# Patient Record
Sex: FEMALE | Race: WHITE | Employment: UNEMPLOYED | URBAN - METROPOLITAN AREA
[De-identification: names, ages, dates, MRNs, and addresses within clinical notes are randomized per-mention and may not be internally consistent; named-entity substitution may affect disease eponyms.]

---

## 2017-10-07 ENCOUNTER — HOSPITAL ENCOUNTER (EMERGENCY)
Age: 27
Discharge: HOME | End: 2017-10-07
Attending: EMERGENCY MEDICINE
Payer: SELF-PAY

## 2017-10-07 VITALS
HEART RATE: 88 BPM | RESPIRATION RATE: 16 BRPM | TEMPERATURE: 98.1 F | DIASTOLIC BLOOD PRESSURE: 86 MMHG | HEIGHT: 68 IN | SYSTOLIC BLOOD PRESSURE: 138 MMHG | BODY MASS INDEX: 39.25 KG/M2 | OXYGEN SATURATION: 100 % | WEIGHT: 259 LBS

## 2017-10-07 DIAGNOSIS — R25.1 EPISODE OF SHAKING: Primary | ICD-10-CM

## 2017-10-07 LAB
ALBUMIN SERPL-MCNC: 4.3 G/DL (ref 3.5–5)
ALBUMIN/GLOB SERPL: 1.2 {RATIO} (ref 1.2–3.5)
ALP SERPL-CCNC: 53 U/L (ref 50–136)
ALT SERPL-CCNC: 36 U/L (ref 12–65)
ANION GAP SERPL CALC-SCNC: 9 MMOL/L (ref 7–16)
AST SERPL-CCNC: 15 U/L (ref 15–37)
BACTERIA URNS QL MICRO: NORMAL /HPF
BASOPHILS # BLD: 0.1 K/UL (ref 0–0.2)
BASOPHILS NFR BLD: 1 % (ref 0–2)
BILIRUB SERPL-MCNC: 0.2 MG/DL (ref 0.2–1.1)
BUN SERPL-MCNC: 16 MG/DL (ref 6–23)
CALCIUM SERPL-MCNC: 9.7 MG/DL (ref 8.3–10.4)
CASTS URNS QL MICRO: 0 /LPF
CHLORIDE SERPL-SCNC: 106 MMOL/L (ref 98–107)
CO2 SERPL-SCNC: 27 MMOL/L (ref 21–32)
CREAT SERPL-MCNC: 0.85 MG/DL (ref 0.6–1)
DIFFERENTIAL METHOD BLD: ABNORMAL
EOSINOPHIL # BLD: 0.1 K/UL (ref 0–0.8)
EOSINOPHIL NFR BLD: 1 % (ref 0.5–7.8)
EPI CELLS #/AREA URNS HPF: NORMAL /HPF
ERYTHROCYTE [DISTWIDTH] IN BLOOD BY AUTOMATED COUNT: 12.5 % (ref 11.9–14.6)
GLOBULIN SER CALC-MCNC: 3.6 G/DL (ref 2.3–3.5)
GLUCOSE BLD STRIP.AUTO-MCNC: 96 MG/DL (ref 65–100)
GLUCOSE SERPL-MCNC: 89 MG/DL (ref 65–100)
HCG UR QL: NEGATIVE
HCT VFR BLD AUTO: 38 % (ref 35.8–46.3)
HGB BLD-MCNC: 13.6 G/DL (ref 11.7–15.4)
IMM GRANULOCYTES # BLD: 0 K/UL (ref 0–0.5)
IMM GRANULOCYTES NFR BLD: 0.3 % (ref 0–5)
LYMPHOCYTES # BLD: 3.5 K/UL (ref 0.5–4.6)
LYMPHOCYTES NFR BLD: 44 % (ref 13–44)
MCH RBC QN AUTO: 31.4 PG (ref 26.1–32.9)
MCHC RBC AUTO-ENTMCNC: 35.8 G/DL (ref 31.4–35)
MCV RBC AUTO: 87.8 FL (ref 79.6–97.8)
MONOCYTES # BLD: 0.4 K/UL (ref 0.1–1.3)
MONOCYTES NFR BLD: 5 % (ref 4–12)
NEUTS SEG # BLD: 3.9 K/UL (ref 1.7–8.2)
NEUTS SEG NFR BLD: 49 % (ref 43–78)
PLATELET # BLD AUTO: 367 K/UL (ref 150–450)
PMV BLD AUTO: 9.9 FL (ref 10.8–14.1)
POTASSIUM SERPL-SCNC: 3.8 MMOL/L (ref 3.5–5.1)
PROT SERPL-MCNC: 7.9 G/DL (ref 6.3–8.2)
RBC # BLD AUTO: 4.33 M/UL (ref 4.05–5.25)
RBC #/AREA URNS HPF: NORMAL /HPF
SODIUM SERPL-SCNC: 142 MMOL/L (ref 136–145)
WBC # BLD AUTO: 7.9 K/UL (ref 4.3–11.1)
WBC URNS QL MICRO: NORMAL /HPF

## 2017-10-07 PROCEDURE — 81015 MICROSCOPIC EXAM OF URINE: CPT | Performed by: EMERGENCY MEDICINE

## 2017-10-07 PROCEDURE — 99284 EMERGENCY DEPT VISIT MOD MDM: CPT | Performed by: EMERGENCY MEDICINE

## 2017-10-07 PROCEDURE — 93005 ELECTROCARDIOGRAM TRACING: CPT | Performed by: EMERGENCY MEDICINE

## 2017-10-07 PROCEDURE — 82962 GLUCOSE BLOOD TEST: CPT

## 2017-10-07 PROCEDURE — 85025 COMPLETE CBC W/AUTO DIFF WBC: CPT | Performed by: EMERGENCY MEDICINE

## 2017-10-07 PROCEDURE — 81025 URINE PREGNANCY TEST: CPT

## 2017-10-07 PROCEDURE — 80053 COMPREHEN METABOLIC PANEL: CPT | Performed by: EMERGENCY MEDICINE

## 2017-10-07 PROCEDURE — 81003 URINALYSIS AUTO W/O SCOPE: CPT | Performed by: EMERGENCY MEDICINE

## 2017-10-07 NOTE — DISCHARGE INSTRUCTIONS
Learning About Psychogenic Non-Epileptic Seizure  What is psychogenic non-epileptic seizure (PNES)? Psychogenic non-epileptic seizures (PNES) don't have a physical cause. They aren't caused by epilepsy. But people with epilepsy also may have PNES. People who have a lot of stress, mental illness, or emotional trauma may be more likely to have PNES. Even though PNES doesn't have a physical cause, it is a real condition. The seizures can be scary. And not knowing why you're having them can be frustrating. What happens during PNES? PNES may look like epileptic seizures. But epileptic seizures usually follow the same pattern every time. With PNES, each episode may be different. During a PNES episode, you may have jerky movements, tingling skin, or problems with coordination. You may notice changes in your vision or sense of smell. Some people have episodes often. Others have them only once in a while. For some people, episodes stop over time. Other people keep having them. How is PNES diagnosed? Your doctor will do tests to find out if you have epilepsy. An EEG test lets your doctor see the electrical activity of your brain. The test is often used to diagnose epilepsy. It helps your doctor know what types of seizures you are having. Your doctor also may do blood tests. PNES can be mistaken for epilepsy at first. As a result, some people with PNES are treated with epilepsy medicines. But most of the time, these medicines don't help. The right diagnosis allows your doctor to give you treatments that will help with the stress and other issues that may be related to PNES. How is PNES treated? Treatment varies with each person. The goals of treatment are to relieve stress and to help you learn ways to cope with difficult areas of your life. You may get medicines or counseling. A type of counseling called cognitive-behavioral therapy (CBT) may help with the stress and emotional issues.  In CBT, you learn to identify and change thinking styles that may be adding to your stress. CBT is done by licensed mental health providers, such as psychologists, social workers, and therapists. It can be done in one-on-one sessions or in a group setting. Care at home  Lowering your stress may help with PNES. Here are some things you can do. How to relax your mind  · Write. It may help to write about things that are bothering you. This helps you find out how much stress you feel and what is causing it. When you know this, you can find better ways to cope. · Let your feelings out. Talk, laugh, cry, and express anger when you need to. Talking with friends, family, a counselor, or a member of the clergy about your feelings is a healthy way to relieve stress. · Do something you enjoy. For example, listen to music or go to a movie. Practice your hobby or do volunteer work. · Meditate. This can help you relax, because you are not worrying about what happened before or what may happen in the future. · Do guided imagery. Imagine yourself in any setting that helps you feel calm. You can use audiotapes, books, or a teacher to guide you. How to relax your body  · Do something active. Exercise or activity can help reduce stress. Walking is a great way to get started. Even everyday activities such as housecleaning or yard work can help. · Do breathing exercises. For example:  1. From a standing position, bend forward from the waist with your knees slightly bent. Let your arms dangle close to the floor. 2. Breathe in slowly and deeply as you return to a standing position. Roll up slowly, and lift your head last.  3. Hold your breath for just a few seconds in the standing position. 4. Breathe out slowly and bend forward from the waist.  · Try yoga or mahsa chi. These techniques combine exercise and meditation. You may need some training at first to learn them.   Talk to your doctor about whether it is safe to do certain activities, such as drive or swim. Follow-up care is a key part of your treatment and safety. Be sure to make and go to all appointments, and call your doctor if you are having problems. It's also a good idea to know your test results and keep a list of the medicines you take. Where can you learn more? Go to http://raji-aurea.info/. Enter W302 in the search box to learn more about \"Learning About Psychogenic Non-Epileptic Seizure. \"  Current as of: October 14, 2016  Content Version: 11.3  © 5228-4216 [employer]. Care instructions adapted under license by [employer] (which disclaims liability or warranty for this information). If you have questions about a medical condition or this instruction, always ask your healthcare professional. [address] any warranty or liability for your use of this information.

## 2017-10-07 NOTE — ED NOTES
I have reviewed discharge instructions with the patient. The patient verbalized understanding. Patient left ED via Discharge Method: ambulatory to Home with her mother. Opportunity for questions and clarification provided. Patient given 0 scripts.

## 2017-10-07 NOTE — ED TRIAGE NOTES
Pt arrives to this facility reporting a seizure that began at approximately 2300. Pt reports \"still feeling\" the seizure at this moment in time and reports it is ongoing. Pt is alert and oriented x 4. Pt reports that she has been awaiting a neurologist consult for the past three months. PT reports beginning to have seizures when she was 21years old.

## 2017-10-07 NOTE — ED PROVIDER NOTES
HPI Comments: Patient states she had a seizure tonight. She states she was at home and bent over when she felt a sharp stabbing pain in her left posterior leg. It caused her to scream out. She states that she started having areas of tingling and then jumping which moved around her body. She remembers all of this and did not lose consciousness. She states she had similar symptoms about 2 weeks ago and went to [facility].  She was seeing Dr. George Hahn of neurology for her seizures but 6 months ago he closed his practice. She takes Topamax for seizures and states she has been taking this medicine. Elements of this note were made using speech recognition software. As such, errors of speech recognition may occur. Patient is a 32 y.o. female presenting with seizures. The history is provided by the patient. Seizure    Pertinent negatives include no nausea and no vomiting. She reports no vomiting. Past Medical History:   Diagnosis Date    Neurological disorder     Other ill-defined conditions(799.89)     narcalepsy    Other ill-defined conditions(799.89)     fibromyalgia    Other ill-defined conditions(799.89)     PCOS (polycystic ovarian syndrome)        Past Surgical History:   Procedure Laterality Date    HX CHOLECYSTECTOMY      HX WISDOM TEETH EXTRACTION           Family History:   Problem Relation Age of Onset    Diabetes Father     Hypertension Father     Breast Cancer Neg Hx     Ovarian Cancer Neg Hx     Colon Cancer Neg Hx        Social History     Social History    Marital status: SINGLE     Spouse name: N/A    Number of children: N/A    Years of education: N/A     Occupational History    Not on file.      Social History Main Topics    Smoking status: Never Smoker    Smokeless tobacco: Never Used    Alcohol use No    Drug use: No    Sexual activity: Not Currently     Other Topics Concern    Not on file     Social History Narrative         ALLERGIES: Latex, natural rubber; Amoxicillin; Doxycycline; Epinephrine; Gluten; Lyrica [pregabalin]; Neosporin [benzalkonium chloride]; Penicillins; Sulfa (sulfonamide antibiotics); and Zithromax [azithromycin]    Review of Systems   Constitutional: Negative for chills and fever. Gastrointestinal: Negative for nausea and vomiting. All other systems reviewed and are negative. Vitals:    10/07/17 0010   BP: (!) 146/93   Pulse: (!) 102   Resp: 18   Temp: 98.5 °F (36.9 °C)   SpO2: 100%   Weight: 117.5 kg (259 lb)   Height: 5' 8\" (1.727 m)            Physical Exam   Constitutional: She is oriented to person, place, and time. She appears well-developed and well-nourished. HENT:   Head: Normocephalic and atraumatic. Eyes: Conjunctivae are normal. Pupils are equal, round, and reactive to light. Neck: Normal range of motion. Neck supple. Cardiovascular: Normal rate and regular rhythm. Pulmonary/Chest: Effort normal and breath sounds normal.   Musculoskeletal: She exhibits no edema or tenderness. Neurological: She is alert and oriented to person, place, and time. Skin: Skin is warm and dry. Psychiatric: She has a normal mood and affect. Her behavior is normal.   Nursing note and vitals reviewed.        MDM  Number of Diagnoses or Management Options  Episode of shaking: established and worsening  Diagnosis management comments: Differential diagnoses: Seizure, left-sided abnormality, anxiety  3:51 AM discussed results with patient and mom  She has an appointment with neurology on October 19       Amount and/or Complexity of Data Reviewed  Clinical lab tests: ordered and reviewed    Risk of Complications, Morbidity, and/or Mortality  Presenting problems: moderate  Diagnostic procedures: moderate  Management options: moderate    Patient Progress  Patient progress: stable    ED Course       Procedures

## 2017-10-08 LAB
ATRIAL RATE: 99 BPM
CALCULATED P AXIS, ECG09: 49 DEGREES
CALCULATED R AXIS, ECG10: 11 DEGREES
CALCULATED T AXIS, ECG11: 44 DEGREES
DIAGNOSIS, 93000: NORMAL
P-R INTERVAL, ECG05: 142 MS
Q-T INTERVAL, ECG07: 348 MS
QRS DURATION, ECG06: 84 MS
QTC CALCULATION (BEZET), ECG08: 446 MS
VENTRICULAR RATE, ECG03: 99 BPM

## 2020-06-01 ENCOUNTER — HOSPITAL ENCOUNTER (EMERGENCY)
Age: 30
Discharge: HOME | End: 2020-06-01
Attending: EMERGENCY MEDICINE
Payer: SELF-PAY

## 2020-06-01 VITALS
DIASTOLIC BLOOD PRESSURE: 71 MMHG | HEART RATE: 78 BPM | BODY MASS INDEX: 39.1 KG/M2 | TEMPERATURE: 98 F | OXYGEN SATURATION: 98 % | SYSTOLIC BLOOD PRESSURE: 121 MMHG | WEIGHT: 258 LBS | RESPIRATION RATE: 20 BRPM | HEIGHT: 68 IN

## 2020-06-01 DIAGNOSIS — G43.909 MIGRAINE WITHOUT STATUS MIGRAINOSUS, NOT INTRACTABLE, UNSPECIFIED MIGRAINE TYPE: Primary | ICD-10-CM

## 2020-06-01 DIAGNOSIS — M54.50 ACUTE LOW BACK PAIN WITHOUT SCIATICA, UNSPECIFIED BACK PAIN LATERALITY: ICD-10-CM

## 2020-06-01 PROCEDURE — 96372 THER/PROPH/DIAG INJ SC/IM: CPT

## 2020-06-01 PROCEDURE — 99283 EMERGENCY DEPT VISIT LOW MDM: CPT

## 2020-06-01 PROCEDURE — 74011250636 HC RX REV CODE- 250/636: Performed by: EMERGENCY MEDICINE

## 2020-06-01 RX ORDER — PROCHLORPERAZINE EDISYLATE 5 MG/ML
10 INJECTION INTRAMUSCULAR; INTRAVENOUS
Status: COMPLETED | OUTPATIENT
Start: 2020-06-01 | End: 2020-06-01

## 2020-06-01 RX ORDER — DEXAMETHASONE SODIUM PHOSPHATE 100 MG/10ML
10 INJECTION INTRAMUSCULAR; INTRAVENOUS
Status: COMPLETED | OUTPATIENT
Start: 2020-06-01 | End: 2020-06-01

## 2020-06-01 RX ORDER — MORPHINE SULFATE 10 MG/ML
10 INJECTION, SOLUTION INTRAMUSCULAR; INTRAVENOUS
Status: COMPLETED | OUTPATIENT
Start: 2020-06-01 | End: 2020-06-01

## 2020-06-01 RX ADMIN — PROCHLORPERAZINE EDISYLATE 10 MG: 5 INJECTION INTRAMUSCULAR; INTRAVENOUS at 16:27

## 2020-06-01 RX ADMIN — DEXAMETHASONE SODIUM PHOSPHATE 10 MG: 10 INJECTION INTRAMUSCULAR; INTRAVENOUS at 16:26

## 2020-06-01 RX ADMIN — MORPHINE SULFATE 10 MG: 10 INJECTION INTRAVENOUS at 16:27

## 2020-06-01 NOTE — LETTER
[address] [address] EMERGENCY DEPT 
[address] [address] [facility] [address] [phone] Work/School Note Date: 6/1/2020 To Whom It May concern: 
 
Brandi Gutierrez was seen and treated today in the emergency room by the following provider(s): 
Attending Provider: Robbie Pinon MD.   
 
Brandi Gutierrez {Return to school/sport/work: 6/3/2020 Sincerely, Ayan Hager MD

## 2020-06-01 NOTE — ED TRIAGE NOTES
Patient co headache going on for 3 days.   Patient states she was helping a family member up and hurt her lower back

## 2020-06-01 NOTE — ED PROVIDER NOTES
Patient is a 24-year-old female with a history of migraine headaches who comes to the ER today complaining of a migraine headache for the past 3 days. No relief with home medications. No trauma or injury. States she is also had nausea and vomiting as well as photophobia. Patient also complains of left lower back pain. She was helping  her grandfather off the floor today when grandmother tried to pull the patient as well and she twisted her back. The history is provided by the patient. Headache    This is a new problem. The current episode started more than 2 days ago. The problem occurs constantly. The problem has not changed since onset. The headache is aggravated by nothing. The pain is located in the generalized region. The quality of the pain is described as throbbing. The pain is moderate. Associated symptoms include malaise/fatigue, nausea and vomiting. Pertinent negatives include no fever, no chest pressure, no palpitations, no shortness of breath, no weakness, no tingling and no visual change. She has tried NSAIDs and triptan therapy for the symptoms. The treatment provided no relief.         Past Medical History:   Diagnosis Date    Neurological disorder     Other ill-defined conditions(799.89)     narcalepsy    Other ill-defined conditions(799.89)     fibromyalgia    Other ill-defined conditions(799.89)     PCOS (polycystic ovarian syndrome)        Past Surgical History:   Procedure Laterality Date    HX CHOLECYSTECTOMY      HX WISDOM TEETH EXTRACTION           Family History:   Problem Relation Age of Onset    Diabetes Father     Hypertension Father     Breast Cancer Neg Hx     Ovarian Cancer Neg Hx     Colon Cancer Neg Hx        Social History     Socioeconomic History    Marital status: SINGLE     Spouse name: Not on file    Number of children: Not on file    Years of education: Not on file    Highest education level: Not on file   Occupational History    Not on file Social Needs    Financial resource strain: Not on file    Food insecurity     Worry: Not on file     Inability: Not on file    Transportation needs     Medical: Not on file     Non-medical: Not on file   Tobacco Use    Smoking status: Never Smoker    Smokeless tobacco: Never Used   Substance and Sexual Activity    Alcohol use: No    Drug use: No    Sexual activity: Not Currently   Lifestyle    Physical activity     Days per week: Not on file     Minutes per session: Not on file    Stress: Not on file   Relationships    Social connections     Talks on phone: Not on file     Gets together: Not on file     Attends Lutheran service: Not on file     Active member of club or organization: Not on file     Attends meetings of clubs or organizations: Not on file     Relationship status: Not on file    Intimate partner violence     Fear of current or ex partner: Not on file     Emotionally abused: Not on file     Physically abused: Not on file     Forced sexual activity: Not on file   Other Topics Concern    Not on file   Social History Narrative    Not on file         ALLERGIES: Latex, natural rubber; Amoxicillin; Doxycycline; Epinephrine; Gluten; Lyrica [pregabalin]; Neosporin [benzalkonium chloride]; Penicillins; Sulfa (sulfonamide antibiotics); and Zithromax [azithromycin]    Review of Systems   Constitutional: Positive for malaise/fatigue. Negative for chills, fatigue and fever. HENT: Negative for congestion, rhinorrhea and sore throat. Eyes: Negative for pain, discharge and visual disturbance. Respiratory: Negative for cough and shortness of breath. Cardiovascular: Negative for chest pain and palpitations. Gastrointestinal: Positive for nausea and vomiting. Negative for abdominal pain and diarrhea. Endocrine: Negative for polydipsia and polyuria. Genitourinary: Negative for dysuria, frequency and urgency. Musculoskeletal: Positive for back pain. Negative for neck pain.    Skin: Negative for rash. Neurological: Positive for headaches. Negative for tingling, seizures, syncope and weakness. Hematological: Negative. Vitals:    06/01/20 1430   BP: 113/78   Pulse: 100   Resp: (!) 98   Temp: 98.1 °F (36.7 °C)   SpO2: 96%   Weight: 117 kg (258 lb)   Height: 5' 8\" (1.727 m)            Physical Exam  Vitals signs and nursing note reviewed. Constitutional:       Appearance: Normal appearance. She is well-developed. She is obese. HENT:      Head: Normocephalic and atraumatic. Nose: Nose normal.   Eyes:      Conjunctiva/sclera: Conjunctivae normal.      Pupils: Pupils are equal, round, and reactive to light. Neck:      Musculoskeletal: Normal range of motion and neck supple. Cardiovascular:      Rate and Rhythm: Normal rate and regular rhythm. Pulmonary:      Effort: Pulmonary effort is normal.      Breath sounds: Normal breath sounds. Abdominal:      Palpations: Abdomen is soft. Tenderness: There is no abdominal tenderness. There is no guarding or rebound. Musculoskeletal: Normal range of motion. General: No deformity or signs of injury. Comments: No vertebral tenderness, step-off, or deformity. There is muscle spasm to the left lumbar paraspinal muscles. Lymphadenopathy:      Cervical: No cervical adenopathy. Skin:     General: Skin is warm and dry. Capillary Refill: Capillary refill takes less than 2 seconds. Coloration: Skin is pale. Findings: No rash. Neurological:      General: No focal deficit present. Mental Status: She is alert and oriented to person, place, and time. GCS: GCS eye subscore is 4. GCS verbal subscore is 5. GCS motor subscore is 6. Cranial Nerves: Cranial nerves are intact. No cranial nerve deficit. Sensory: Sensation is intact. No sensory deficit. Motor: Motor function is intact. No weakness, tremor or seizure activity.       Comments: Straight leg raise is negative          MDM  Number of Diagnoses or Management Options  Diagnosis management comments: I wore appropriate PPE throughout this patient's ED visit. Angelia Butt MD, 4:51 PM    Voice dictation software was used during the making of this note. This software is not perfect and grammatical and other typographical errors may be present. This note has been proofread, but may still contain errors.   Angelia Butt MD; 6/1/2020 @4:51 PM   ===================================================================         Amount and/or Complexity of Data Reviewed  Review and summarize past medical records: yes  Independent visualization of images, tracings, or specimens: yes    Risk of Complications, Morbidity, and/or Mortality  Presenting problems: low  Diagnostic procedures: minimal  Management options: low    Patient Progress  Patient progress: improved         Procedures

## 2020-06-01 NOTE — ED NOTES
I have reviewed discharge instructions with the patient. The patient verbalized understanding. Patient left ED via Discharge Method: ambulatory to Home with (mother). Opportunity for questions and clarification provided. Patient given 0 scripts. To continue your aftercare when you leave the hospital, you may receive an automated call from our care team to check in on how you are doing. This is a free service and part of our promise to provide the best care and service to meet your aftercare needs.  If you have questions, or wish to unsubscribe from this service please call [phone]. Thank you for Choosing our [facility].

## 2020-06-01 NOTE — DISCHARGE INSTRUCTIONS
Continue with your current medications. Follow-up with your doctor or return to the ER for any other acute concerns.

## 2020-06-02 ENCOUNTER — HOSPITAL ENCOUNTER (EMERGENCY)
Age: 30
Discharge: HOME | End: 2020-06-02
Attending: EMERGENCY MEDICINE
Payer: SELF-PAY

## 2020-06-02 VITALS
WEIGHT: 258 LBS | TEMPERATURE: 98.3 F | HEART RATE: 89 BPM | HEIGHT: 68 IN | RESPIRATION RATE: 18 BRPM | OXYGEN SATURATION: 100 % | SYSTOLIC BLOOD PRESSURE: 106 MMHG | BODY MASS INDEX: 39.1 KG/M2 | DIASTOLIC BLOOD PRESSURE: 61 MMHG

## 2020-06-02 DIAGNOSIS — G43.009 MIGRAINE WITHOUT AURA AND WITHOUT STATUS MIGRAINOSUS, NOT INTRACTABLE: Primary | ICD-10-CM

## 2020-06-02 PROCEDURE — 74011250637 HC RX REV CODE- 250/637: Performed by: EMERGENCY MEDICINE

## 2020-06-02 PROCEDURE — 96374 THER/PROPH/DIAG INJ IV PUSH: CPT

## 2020-06-02 PROCEDURE — 96375 TX/PRO/DX INJ NEW DRUG ADDON: CPT

## 2020-06-02 PROCEDURE — 99284 EMERGENCY DEPT VISIT MOD MDM: CPT

## 2020-06-02 PROCEDURE — 74011250636 HC RX REV CODE- 250/636: Performed by: EMERGENCY MEDICINE

## 2020-06-02 RX ORDER — SUMATRIPTAN 50 MG/1
50 TABLET, FILM COATED ORAL
Status: COMPLETED | OUTPATIENT
Start: 2020-06-02 | End: 2020-06-02

## 2020-06-02 RX ORDER — SUMATRIPTAN 100 MG/1
100 TABLET, FILM COATED ORAL
Qty: 10 TAB | Refills: 0 | Status: SHIPPED | OUTPATIENT
Start: 2020-06-02 | End: 2020-08-28

## 2020-06-02 RX ORDER — BUTALBITAL, ACETAMINOPHEN AND CAFFEINE 300; 40; 50 MG/1; MG/1; MG/1
1 CAPSULE ORAL 3 TIMES DAILY
Qty: 11 CAP | Refills: 0 | Status: SHIPPED | OUTPATIENT
Start: 2020-06-02 | End: 2020-06-11

## 2020-06-02 RX ORDER — METOCLOPRAMIDE HYDROCHLORIDE 5 MG/ML
10 INJECTION INTRAMUSCULAR; INTRAVENOUS
Status: COMPLETED | OUTPATIENT
Start: 2020-06-02 | End: 2020-06-02

## 2020-06-02 RX ORDER — DIPHENHYDRAMINE HYDROCHLORIDE 50 MG/ML
25 INJECTION, SOLUTION INTRAMUSCULAR; INTRAVENOUS
Status: COMPLETED | OUTPATIENT
Start: 2020-06-02 | End: 2020-06-02

## 2020-06-02 RX ADMIN — DIPHENHYDRAMINE HYDROCHLORIDE 25 MG: 50 INJECTION, SOLUTION INTRAMUSCULAR; INTRAVENOUS at 19:35

## 2020-06-02 RX ADMIN — SUMATRIPTAN SUCCINATE 50 MG: 50 TABLET ORAL at 19:34

## 2020-06-02 RX ADMIN — METOCLOPRAMIDE 10 MG: 5 INJECTION, SOLUTION INTRAMUSCULAR; INTRAVENOUS at 19:36

## 2020-06-02 RX ADMIN — SODIUM CHLORIDE 1000 ML: 900 INJECTION, SOLUTION INTRAVENOUS at 19:34

## 2020-06-02 NOTE — ED NOTES
Report from [facility]. Pt resting quietly on stretcher. Pt attached to BP cuff and pulse ox. VSS. Pt has c/o migraine. Meds given per MAR. Warm blanket provided. Will continue to monitor.

## 2020-06-02 NOTE — ED PROVIDER NOTES
26-year-old lady with a history of migraines presents with concerns about a migraine headache. She says that she had a similar headache yesterday but got some medication that provided some relief. She notes though that her headache came back yesterday evening and this morning. She says that she is also had some nausea but no active vomiting or diarrhea. She denies any fevers or chills. She said that she is had this now for about 4 days. Elements of this note were created using speech recognition software. As such, errors of speech recognition may be present.            Past Medical History:   Diagnosis Date    Neurological disorder     Other ill-defined conditions(799.89)     narcalepsy    Other ill-defined conditions(799.89)     fibromyalgia    Other ill-defined conditions(799.89)     PCOS (polycystic ovarian syndrome)        Past Surgical History:   Procedure Laterality Date    HX CHOLECYSTECTOMY      HX WISDOM TEETH EXTRACTION           Family History:   Problem Relation Age of Onset    Diabetes Father     Hypertension Father     Breast Cancer Neg Hx     Ovarian Cancer Neg Hx     Colon Cancer Neg Hx        Social History     Socioeconomic History    Marital status: SINGLE     Spouse name: Not on file    Number of children: Not on file    Years of education: Not on file    Highest education level: Not on file   Occupational History    Not on file   Social Needs    Financial resource strain: Not on file    Food insecurity     Worry: Not on file     Inability: Not on file    Transportation needs     Medical: Not on file     Non-medical: Not on file   Tobacco Use    Smoking status: Never Smoker    Smokeless tobacco: Never Used   Substance and Sexual Activity    Alcohol use: No    Drug use: No    Sexual activity: Not Currently   Lifestyle    Physical activity     Days per week: Not on file     Minutes per session: Not on file    Stress: Not on file   Relationships    Social connections     Talks on phone: Not on file     Gets together: Not on file     Attends Tenriism service: Not on file     Active member of club or organization: Not on file     Attends meetings of clubs or organizations: Not on file     Relationship status: Not on file    Intimate partner violence     Fear of current or ex partner: Not on file     Emotionally abused: Not on file     Physically abused: Not on file     Forced sexual activity: Not on file   Other Topics Concern    Not on file   Social History Narrative    Not on file         ALLERGIES: Latex, natural rubber; Amoxicillin; Doxycycline; Epinephrine; Gluten; Lyrica [pregabalin]; Neosporin [benzalkonium chloride]; Penicillins; Sulfa (sulfonamide antibiotics); and Zithromax [azithromycin]    Review of Systems   Constitutional: Negative for chills, diaphoresis and fever. HENT: Negative for congestion, rhinorrhea and sore throat. Eyes: Negative for redness and visual disturbance. Respiratory: Negative for cough, chest tightness, shortness of breath and wheezing. Cardiovascular: Negative for chest pain and palpitations. Gastrointestinal: Positive for nausea. Negative for abdominal pain, blood in stool, diarrhea and vomiting. Endocrine: Negative for polydipsia and polyuria. Genitourinary: Negative for dysuria and hematuria. Musculoskeletal: Negative for arthralgias, myalgias and neck stiffness. Skin: Negative for rash. Allergic/Immunologic: Negative for environmental allergies and food allergies. Neurological: Positive for headaches. Negative for dizziness and weakness. Hematological: Negative for adenopathy. Does not bruise/bleed easily. Psychiatric/Behavioral: Negative for confusion and sleep disturbance. The patient is not nervous/anxious.         Vitals:    06/02/20 1812   BP: 110/70   Pulse: 100   Resp: 14   Temp: 98.6 °F (37 °C)   SpO2: 97%   Weight: 117 kg (258 lb)   Height: 5' 8\" (1.727 m)            Physical Exam  Vitals signs and nursing note reviewed. Constitutional:       General: She is not in acute distress. Appearance: She is well-developed. She is not toxic-appearing. HENT:      Head: Normocephalic and atraumatic. Eyes:      General: No scleral icterus. Right eye: No discharge. Left eye: No discharge. Conjunctiva/sclera: Conjunctivae normal.      Pupils: Pupils are equal, round, and reactive to light. Neck:      Musculoskeletal: Normal range of motion. No neck rigidity. Cardiovascular:      Rate and Rhythm: Normal rate and regular rhythm. Heart sounds: Normal heart sounds. Pulmonary:      Effort: Pulmonary effort is normal. No respiratory distress. Breath sounds: Normal breath sounds. No wheezing or rales. Chest:      Chest wall: No tenderness. Abdominal:      General: Bowel sounds are normal. There is no distension. Palpations: Abdomen is soft. Tenderness: There is no guarding or rebound. Musculoskeletal: Normal range of motion. General: No tenderness. Lymphadenopathy:      Cervical: No cervical adenopathy. Skin:     General: Skin is warm and dry. Neurological:      General: No focal deficit present. Mental Status: She is alert and oriented to person, place, and time. Psychiatric:         Mood and Affect: Mood normal.         Behavior: Behavior normal.          MDM  Number of Diagnoses or Management Options  Diagnosis management comments: I will treat with Benadryl, Reglan, and Imitrex.          Procedures

## 2020-06-02 NOTE — LETTER
[address] [address] EMERGENCY DEPT 
[address] [address] [facility] [address] [phone] Work/School Note Date: 6/2/2020 To Whom It May concern: 
 
Aruna Henry was seen and treated today in the emergency room by the following provider(s): 
Attending Provider: Danya Garrett MD.   
 
Janelle Dickerson please excuse her from work on Tuesday and Wednesday. Sincerely, Zoltan Tinajero MD

## 2020-06-03 ENCOUNTER — HOSPITAL ENCOUNTER (EMERGENCY)
Age: 30
Discharge: HOME | End: 2020-06-03
Attending: EMERGENCY MEDICINE
Payer: SELF-PAY

## 2020-06-03 VITALS
HEIGHT: 68 IN | BODY MASS INDEX: 37.89 KG/M2 | HEART RATE: 73 BPM | OXYGEN SATURATION: 99 % | TEMPERATURE: 98 F | DIASTOLIC BLOOD PRESSURE: 65 MMHG | RESPIRATION RATE: 16 BRPM | WEIGHT: 250 LBS | SYSTOLIC BLOOD PRESSURE: 113 MMHG

## 2020-06-03 DIAGNOSIS — G43.911 INTRACTABLE MIGRAINE WITH STATUS MIGRAINOSUS, UNSPECIFIED MIGRAINE TYPE: Primary | ICD-10-CM

## 2020-06-03 PROCEDURE — 99284 EMERGENCY DEPT VISIT MOD MDM: CPT

## 2020-06-03 PROCEDURE — 96374 THER/PROPH/DIAG INJ IV PUSH: CPT

## 2020-06-03 PROCEDURE — 74011250636 HC RX REV CODE- 250/636: Performed by: EMERGENCY MEDICINE

## 2020-06-03 PROCEDURE — 96365 THER/PROPH/DIAG IV INF INIT: CPT

## 2020-06-03 PROCEDURE — 96375 TX/PRO/DX INJ NEW DRUG ADDON: CPT

## 2020-06-03 RX ORDER — SODIUM CHLORIDE 0.9 % (FLUSH) 0.9 %
5-40 SYRINGE (ML) INJECTION AS NEEDED
Status: DISCONTINUED | OUTPATIENT
Start: 2020-06-03 | End: 2020-06-03 | Stop reason: HOSPADM

## 2020-06-03 RX ORDER — DEXAMETHASONE SODIUM PHOSPHATE 100 MG/10ML
10 INJECTION INTRAMUSCULAR; INTRAVENOUS
Status: COMPLETED | OUTPATIENT
Start: 2020-06-03 | End: 2020-06-03

## 2020-06-03 RX ORDER — MAGNESIUM SULFATE HEPTAHYDRATE 40 MG/ML
2 INJECTION, SOLUTION INTRAVENOUS
Status: COMPLETED | OUTPATIENT
Start: 2020-06-03 | End: 2020-06-03

## 2020-06-03 RX ORDER — KETOROLAC TROMETHAMINE 30 MG/ML
30 INJECTION, SOLUTION INTRAMUSCULAR; INTRAVENOUS
Status: COMPLETED | OUTPATIENT
Start: 2020-06-03 | End: 2020-06-03

## 2020-06-03 RX ORDER — SODIUM CHLORIDE 0.9 % (FLUSH) 0.9 %
5-40 SYRINGE (ML) INJECTION EVERY 8 HOURS
Status: DISCONTINUED | OUTPATIENT
Start: 2020-06-03 | End: 2020-06-03 | Stop reason: HOSPADM

## 2020-06-03 RX ORDER — LORAZEPAM 2 MG/ML
0.5 INJECTION INTRAMUSCULAR
Status: COMPLETED | OUTPATIENT
Start: 2020-06-03 | End: 2020-06-03

## 2020-06-03 RX ORDER — METOCLOPRAMIDE HYDROCHLORIDE 5 MG/ML
10 INJECTION INTRAMUSCULAR; INTRAVENOUS
Status: COMPLETED | OUTPATIENT
Start: 2020-06-03 | End: 2020-06-03

## 2020-06-03 RX ADMIN — DEXAMETHASONE SODIUM PHOSPHATE 10 MG: 10 INJECTION INTRAMUSCULAR; INTRAVENOUS at 18:23

## 2020-06-03 RX ADMIN — LORAZEPAM 0.5 MG: 2 INJECTION INTRAMUSCULAR; INTRAVENOUS at 18:22

## 2020-06-03 RX ADMIN — KETOROLAC TROMETHAMINE 30 MG: 30 INJECTION, SOLUTION INTRAMUSCULAR at 18:23

## 2020-06-03 RX ADMIN — METOCLOPRAMIDE 10 MG: 5 INJECTION, SOLUTION INTRAMUSCULAR; INTRAVENOUS at 18:23

## 2020-06-03 RX ADMIN — MAGNESIUM SULFATE HEPTAHYDRATE 2 G: 40 INJECTION, SOLUTION INTRAVENOUS at 18:22

## 2020-06-03 RX ADMIN — SODIUM CHLORIDE 1000 ML: 900 INJECTION, SOLUTION INTRAVENOUS at 18:21

## 2020-06-03 NOTE — LETTER
NOTIFICATION RETURN TO WORK / SCHOOL 
 
6/3/2020 8:31 PM 
 
Ms. Aruna Esqueda [address] [place] [address] [place] [address] To Whom It May Concern: 
 
Marciano Bradshaw is currently under the care of [facility] EMERGENCY DEPT. She will return to work/school on: 6/5/20 Aruna Esqueda may return to work/school with the following restrictions: N/A If there are questions or concerns please have the patient contact our office.  
 
 
 
Sincerely, 
 
 
[facility]

## 2020-06-03 NOTE — ED NOTES
I have reviewed discharge instructions with the patient. The patient verbalized understanding. Patient left ED via Discharge Method: ambulatory to Home with family. Opportunity for questions and clarification provided. Patient given 2 scripts. To continue your aftercare when you leave the hospital, you may receive an automated call from our care team to check in on how you are doing. This is a free service and part of our promise to provide the best care and service to meet your aftercare needs.  If you have questions, or wish to unsubscribe from this service please call [phone]. Thank you for Choosing our [facility] Emergency Department.

## 2020-06-03 NOTE — DISCHARGE INSTRUCTIONS
Return with any fevers, vomiting, worsening symptoms, or additional concerns. As we discussed, you should follow-up with your neurologist for further evaluation.

## 2020-06-03 NOTE — ED PROVIDER NOTES
Patient has a past medical history of migraines and PCOS. She takes numerous medications for her migraines, has for many years. She was recently on Topamax but was weaned off of it for a different medication. She currently takes Depakote and Seroquel daily. She states she has had a headache for the past 6 days. She describes it as constant achy pain behind her eyes without radiation. She has had photophobia and phonophobia. She has had significant nausea, had vomiting today. She has not been eating or drinking well due to her pain. She denies any fever, denies any known sick contacts. She has been taking her prescribed medications without improvement. She was seen here 2 days ago and yesterday. We sent her home with Imitrex and Fioricet which she has been taking without any improvement.            Past Medical History:   Diagnosis Date    Neurological disorder     Other ill-defined conditions(799.89)     narcalepsy    Other ill-defined conditions(799.89)     fibromyalgia    Other ill-defined conditions(799.89)     PCOS (polycystic ovarian syndrome)        Past Surgical History:   Procedure Laterality Date    HX CHOLECYSTECTOMY      HX WISDOM TEETH EXTRACTION           Family History:   Problem Relation Age of Onset    Diabetes Father     Hypertension Father     Breast Cancer Neg Hx     Ovarian Cancer Neg Hx     Colon Cancer Neg Hx        Social History     Socioeconomic History    Marital status: SINGLE     Spouse name: Not on file    Number of children: Not on file    Years of education: Not on file    Highest education level: Not on file   Occupational History    Not on file   Social Needs    Financial resource strain: Not on file    Food insecurity     Worry: Not on file     Inability: Not on file    Transportation needs     Medical: Not on file     Non-medical: Not on file   Tobacco Use    Smoking status: Never Smoker    Smokeless tobacco: Never Used   Substance and Sexual Activity    Alcohol use: No    Drug use: No    Sexual activity: Not Currently   Lifestyle    Physical activity     Days per week: Not on file     Minutes per session: Not on file    Stress: Not on file   Relationships    Social connections     Talks on phone: Not on file     Gets together: Not on file     Attends Yazidi service: Not on file     Active member of club or organization: Not on file     Attends meetings of clubs or organizations: Not on file     Relationship status: Not on file    Intimate partner violence     Fear of current or ex partner: Not on file     Emotionally abused: Not on file     Physically abused: Not on file     Forced sexual activity: Not on file   Other Topics Concern    Not on file   Social History Narrative    Not on file         ALLERGIES: Latex, natural rubber; Amoxicillin; Doxycycline; Epinephrine; Gluten; Lyrica [pregabalin]; Neosporin [benzalkonium chloride]; Penicillins; Sulfa (sulfonamide antibiotics); and Zithromax [azithromycin]    Review of Systems   Constitutional: Negative for chills and fever. Gastrointestinal: Positive for nausea and vomiting. Neurological: Positive for headaches. All other systems reviewed and are negative. Vitals:    06/03/20 1639   BP: 110/74   Pulse: 94   Resp: 16   Temp: 98.4 °F (36.9 °C)   SpO2: 98%   Weight: 113.4 kg (250 lb)   Height: 5' 8\" (1.727 m)            Physical Exam  Vitals signs and nursing note reviewed. Constitutional:       Appearance: Normal appearance. She is well-developed. She is obese. HENT:      Head: Normocephalic and atraumatic. Eyes:      Conjunctiva/sclera: Conjunctivae normal.      Pupils: Pupils are equal, round, and reactive to light. Comments: Nondilated funduscopic exam shows sharp disc margins with normal vessels in her right eye   Neck:      Musculoskeletal: Normal range of motion and neck supple. No neck rigidity. Cardiovascular:      Rate and Rhythm: Normal rate and regular rhythm. Heart sounds: Normal heart sounds. Pulmonary:      Effort: Pulmonary effort is normal.      Breath sounds: Normal breath sounds. Abdominal:      General: Bowel sounds are normal.      Palpations: Abdomen is soft. Musculoskeletal:         General: No tenderness. Skin:     General: Skin is warm and dry. Neurological:      Mental Status: She is alert and oriented to person, place, and time. Psychiatric:         Mood and Affect: Mood normal.         Behavior: Behavior normal.          MDM  Number of Diagnoses or Management Options  Intractable migraine with status migrainosus, unspecified migraine type: new and does not require workup  Diagnosis management comments: 8:11 PM patient states her pain is gone from an 8/10 to a 3/10 and she is feeling much better. She appears comfortable and in no distress. She has a neurologist she sees, has an appointment coming up soon.        Amount and/or Complexity of Data Reviewed  Decide to obtain previous medical records or to obtain history from someone other than the patient: yes  Review and summarize past medical records: yes    Risk of Complications, Morbidity, and/or Mortality  Presenting problems: moderate  Diagnostic procedures: moderate  Management options: moderate    Patient Progress  Patient progress: improved         Procedures

## 2020-06-04 ENCOUNTER — APPOINTMENT (OUTPATIENT)
Dept: CT IMAGING | Age: 30
End: 2020-06-04
Attending: EMERGENCY MEDICINE
Payer: SELF-PAY

## 2020-06-04 ENCOUNTER — HOSPITAL ENCOUNTER (EMERGENCY)
Age: 30
Discharge: HOME | End: 2020-06-04
Attending: EMERGENCY MEDICINE
Payer: SELF-PAY

## 2020-06-04 VITALS
HEIGHT: 68 IN | TEMPERATURE: 98.1 F | OXYGEN SATURATION: 100 % | WEIGHT: 220 LBS | RESPIRATION RATE: 16 BRPM | DIASTOLIC BLOOD PRESSURE: 74 MMHG | SYSTOLIC BLOOD PRESSURE: 104 MMHG | BODY MASS INDEX: 33.34 KG/M2 | HEART RATE: 75 BPM

## 2020-06-04 DIAGNOSIS — N39.0 URINARY TRACT INFECTION WITHOUT HEMATURIA, SITE UNSPECIFIED: ICD-10-CM

## 2020-06-04 DIAGNOSIS — R51.9 NONINTRACTABLE HEADACHE, UNSPECIFIED CHRONICITY PATTERN, UNSPECIFIED HEADACHE TYPE: Primary | ICD-10-CM

## 2020-06-04 LAB
ALBUMIN SERPL-MCNC: 3.3 G/DL (ref 3.5–5)
ALBUMIN/GLOB SERPL: 1.1 {RATIO} (ref 1.2–3.5)
ALP SERPL-CCNC: 38 U/L (ref 50–136)
ALT SERPL-CCNC: 25 U/L (ref 12–65)
ANION GAP SERPL CALC-SCNC: 3 MMOL/L (ref 7–16)
AST SERPL-CCNC: 9 U/L (ref 15–37)
BACTERIA URNS QL MICRO: NORMAL /HPF
BASOPHILS # BLD: 0 K/UL (ref 0–0.2)
BASOPHILS NFR BLD: 0 % (ref 0–2)
BILIRUB DIRECT SERPL-MCNC: 0.1 MG/DL
BILIRUB SERPL-MCNC: 0.2 MG/DL (ref 0.2–1.1)
BUN SERPL-MCNC: 19 MG/DL (ref 6–23)
CALCIUM SERPL-MCNC: 8.3 MG/DL (ref 8.3–10.4)
CASTS URNS QL MICRO: NORMAL /LPF
CHLORIDE SERPL-SCNC: 109 MMOL/L (ref 98–107)
CO2 SERPL-SCNC: 30 MMOL/L (ref 21–32)
CREAT SERPL-MCNC: 0.86 MG/DL (ref 0.6–1)
DIFFERENTIAL METHOD BLD: NORMAL
EOSINOPHIL # BLD: 0 K/UL (ref 0–0.8)
EOSINOPHIL NFR BLD: 1 % (ref 0.5–7.8)
EPI CELLS #/AREA URNS HPF: NORMAL /HPF
ERYTHROCYTE [DISTWIDTH] IN BLOOD BY AUTOMATED COUNT: 12.2 % (ref 11.9–14.6)
GLOBULIN SER CALC-MCNC: 2.9 G/DL (ref 2.3–3.5)
GLUCOSE SERPL-MCNC: 84 MG/DL (ref 65–100)
HCG UR QL: NEGATIVE
HCT VFR BLD AUTO: 37.6 % (ref 35.8–46.3)
HGB BLD-MCNC: 12.4 G/DL (ref 11.7–15.4)
IMM GRANULOCYTES # BLD AUTO: 0 K/UL (ref 0–0.5)
IMM GRANULOCYTES NFR BLD AUTO: 0 % (ref 0–5)
LYMPHOCYTES # BLD: 2.9 K/UL (ref 0.5–4.6)
LYMPHOCYTES NFR BLD: 35 % (ref 13–44)
MCH RBC QN AUTO: 30.5 PG (ref 26.1–32.9)
MCHC RBC AUTO-ENTMCNC: 33 G/DL (ref 31.4–35)
MCV RBC AUTO: 92.4 FL (ref 79.6–97.8)
MONOCYTES # BLD: 0.7 K/UL (ref 0.1–1.3)
MONOCYTES NFR BLD: 9 % (ref 4–12)
NEUTS SEG # BLD: 4.7 K/UL (ref 1.7–8.2)
NEUTS SEG NFR BLD: 56 % (ref 43–78)
NRBC # BLD: 0 K/UL (ref 0–0.2)
PLATELET # BLD AUTO: 249 K/UL (ref 150–450)
PMV BLD AUTO: 9.8 FL (ref 9.4–12.3)
POTASSIUM SERPL-SCNC: 3.8 MMOL/L (ref 3.5–5.1)
PROT SERPL-MCNC: 6.2 G/DL (ref 6.3–8.2)
RBC # BLD AUTO: 4.07 M/UL (ref 4.05–5.2)
RBC #/AREA URNS HPF: NORMAL /HPF
SODIUM SERPL-SCNC: 142 MMOL/L (ref 136–145)
WBC # BLD AUTO: 8.4 K/UL (ref 4.3–11.1)
WBC URNS QL MICRO: NORMAL /HPF

## 2020-06-04 PROCEDURE — 96365 THER/PROPH/DIAG IV INF INIT: CPT

## 2020-06-04 PROCEDURE — 96376 TX/PRO/DX INJ SAME DRUG ADON: CPT

## 2020-06-04 PROCEDURE — 81003 URINALYSIS AUTO W/O SCOPE: CPT

## 2020-06-04 PROCEDURE — 80048 BASIC METABOLIC PNL TOTAL CA: CPT

## 2020-06-04 PROCEDURE — 70450 CT HEAD/BRAIN W/O DYE: CPT

## 2020-06-04 PROCEDURE — 80076 HEPATIC FUNCTION PANEL: CPT

## 2020-06-04 PROCEDURE — 96375 TX/PRO/DX INJ NEW DRUG ADDON: CPT

## 2020-06-04 PROCEDURE — 81025 URINE PREGNANCY TEST: CPT

## 2020-06-04 PROCEDURE — 74011250636 HC RX REV CODE- 250/636: Performed by: EMERGENCY MEDICINE

## 2020-06-04 PROCEDURE — 85025 COMPLETE CBC W/AUTO DIFF WBC: CPT

## 2020-06-04 PROCEDURE — 96372 THER/PROPH/DIAG INJ SC/IM: CPT

## 2020-06-04 PROCEDURE — 96366 THER/PROPH/DIAG IV INF ADDON: CPT

## 2020-06-04 PROCEDURE — 99284 EMERGENCY DEPT VISIT MOD MDM: CPT

## 2020-06-04 PROCEDURE — 74011000258 HC RX REV CODE- 258: Performed by: EMERGENCY MEDICINE

## 2020-06-04 PROCEDURE — 74011636637 HC RX REV CODE- 636/637: Performed by: EMERGENCY MEDICINE

## 2020-06-04 PROCEDURE — 81015 MICROSCOPIC EXAM OF URINE: CPT

## 2020-06-04 RX ORDER — DIPHENHYDRAMINE HYDROCHLORIDE 50 MG/ML
25 INJECTION, SOLUTION INTRAMUSCULAR; INTRAVENOUS
Status: COMPLETED | OUTPATIENT
Start: 2020-06-04 | End: 2020-06-04

## 2020-06-04 RX ORDER — SUMATRIPTAN 6 MG/.5ML
6 INJECTION, SOLUTION SUBCUTANEOUS ONCE
Status: COMPLETED | OUTPATIENT
Start: 2020-06-04 | End: 2020-06-04

## 2020-06-04 RX ORDER — MORPHINE SULFATE 4 MG/ML
4 INJECTION INTRAVENOUS
Status: COMPLETED | OUTPATIENT
Start: 2020-06-04 | End: 2020-06-04

## 2020-06-04 RX ORDER — ONDANSETRON 2 MG/ML
4 INJECTION INTRAMUSCULAR; INTRAVENOUS
Status: COMPLETED | OUTPATIENT
Start: 2020-06-04 | End: 2020-06-04

## 2020-06-04 RX ORDER — METOCLOPRAMIDE HYDROCHLORIDE 5 MG/ML
10 INJECTION INTRAMUSCULAR; INTRAVENOUS
Status: COMPLETED | OUTPATIENT
Start: 2020-06-04 | End: 2020-06-04

## 2020-06-04 RX ORDER — DOXYCYCLINE HYCLATE 100 MG
100 TABLET ORAL 2 TIMES DAILY
Qty: 14 TAB | Refills: 0 | Status: SHIPPED | OUTPATIENT
Start: 2020-06-04 | End: 2020-06-11

## 2020-06-04 RX ADMIN — SODIUM CHLORIDE 1000 ML: 900 INJECTION, SOLUTION INTRAVENOUS at 20:32

## 2020-06-04 RX ADMIN — MORPHINE SULFATE 4 MG: 4 INJECTION INTRAVENOUS at 20:32

## 2020-06-04 RX ADMIN — DIPHENHYDRAMINE HYDROCHLORIDE 25 MG: 50 INJECTION, SOLUTION INTRAMUSCULAR; INTRAVENOUS at 22:55

## 2020-06-04 RX ADMIN — METOCLOPRAMIDE 10 MG: 5 INJECTION, SOLUTION INTRAMUSCULAR; INTRAVENOUS at 22:55

## 2020-06-04 RX ADMIN — METHYLPREDNISOLONE SODIUM SUCCINATE 125 MG: 125 INJECTION, POWDER, FOR SOLUTION INTRAMUSCULAR; INTRAVENOUS at 21:41

## 2020-06-04 RX ADMIN — ONDANSETRON 4 MG: 2 INJECTION INTRAMUSCULAR; INTRAVENOUS at 20:33

## 2020-06-04 RX ADMIN — DOXYCYCLINE 100 MG: 100 INJECTION, POWDER, LYOPHILIZED, FOR SOLUTION INTRAVENOUS at 21:40

## 2020-06-04 RX ADMIN — SUMATRIPTAN 6 MG: 6 INJECTION, SOLUTION SUBCUTANEOUS at 21:41

## 2020-06-04 RX ADMIN — MORPHINE SULFATE 4 MG: 4 INJECTION INTRAVENOUS at 21:41

## 2020-06-04 NOTE — ED NOTES
I have reviewed discharge instructions with the patient. The patient verbalized understanding. Patient left ED via Discharge Method: ambulatory to Home with mother. Opportunity for questions and clarification provided. Patient given 0 scripts. To continue your aftercare when you leave the hospital, you may receive an automated call from our care team to check in on how you are doing. This is a free service and part of our promise to provide the best care and service to meet your aftercare needs.  If you have questions, or wish to unsubscribe from this service please call [phone]. Thank you for Choosing our [facility] Emergency Department.

## 2020-06-04 NOTE — DISCHARGE INSTRUCTIONS
Follow-up with your neurologist as scheduled. Return to the emergency department if your symptoms worsen.

## 2020-06-04 NOTE — ED TRIAGE NOTES
Pt has been seen and treated in the ED several times this week for a migraine. Returns today for continued pain.   Saw a neurologist today and plans for an infusion on Saturday

## 2020-06-05 ENCOUNTER — HOSPITAL ENCOUNTER (EMERGENCY)
Age: 30
Discharge: HOME | End: 2020-06-05
Attending: EMERGENCY MEDICINE
Payer: SELF-PAY

## 2020-06-05 VITALS
HEART RATE: 85 BPM | HEIGHT: 68 IN | TEMPERATURE: 98.6 F | DIASTOLIC BLOOD PRESSURE: 60 MMHG | RESPIRATION RATE: 20 BRPM | OXYGEN SATURATION: 98 % | WEIGHT: 220 LBS | BODY MASS INDEX: 33.34 KG/M2 | SYSTOLIC BLOOD PRESSURE: 117 MMHG

## 2020-06-05 DIAGNOSIS — G43.901 STATUS MIGRAINOSUS: Primary | ICD-10-CM

## 2020-06-05 PROCEDURE — 75810000283 HC INJECTION NERVE BLOCK

## 2020-06-05 PROCEDURE — 74011250636 HC RX REV CODE- 250/636: Performed by: EMERGENCY MEDICINE

## 2020-06-05 PROCEDURE — 96365 THER/PROPH/DIAG IV INF INIT: CPT

## 2020-06-05 PROCEDURE — 99285 EMERGENCY DEPT VISIT HI MDM: CPT

## 2020-06-05 PROCEDURE — 99284 EMERGENCY DEPT VISIT MOD MDM: CPT

## 2020-06-05 PROCEDURE — 74011000258 HC RX REV CODE- 258: Performed by: EMERGENCY MEDICINE

## 2020-06-05 PROCEDURE — 96375 TX/PRO/DX INJ NEW DRUG ADDON: CPT

## 2020-06-05 PROCEDURE — 74011000250 HC RX REV CODE- 250: Performed by: EMERGENCY MEDICINE

## 2020-06-05 RX ORDER — SODIUM CHLORIDE 9 MG/ML
1000 INJECTION, SOLUTION INTRAVENOUS ONCE
Status: COMPLETED | OUTPATIENT
Start: 2020-06-05 | End: 2020-06-05

## 2020-06-05 RX ORDER — HYDROMORPHONE HYDROCHLORIDE 1 MG/ML
2 INJECTION, SOLUTION INTRAMUSCULAR; INTRAVENOUS; SUBCUTANEOUS
Status: COMPLETED | OUTPATIENT
Start: 2020-06-05 | End: 2020-06-05

## 2020-06-05 RX ORDER — METOCLOPRAMIDE HYDROCHLORIDE 5 MG/ML
10 INJECTION INTRAMUSCULAR; INTRAVENOUS
Status: COMPLETED | OUTPATIENT
Start: 2020-06-05 | End: 2020-06-05

## 2020-06-05 RX ORDER — METOCLOPRAMIDE 10 MG/1
10 TABLET ORAL
Qty: 12 TAB | Refills: 0 | Status: SHIPPED | OUTPATIENT
Start: 2020-06-05 | End: 2020-06-09

## 2020-06-05 RX ORDER — BUPIVACAINE HYDROCHLORIDE 2.5 MG/ML
10 INJECTION, SOLUTION EPIDURAL; INFILTRATION; INTRACAUDAL ONCE
Status: COMPLETED | OUTPATIENT
Start: 2020-06-05 | End: 2020-06-05

## 2020-06-05 RX ORDER — VALPROATE SODIUM 100 MG/ML
500 INJECTION INTRAVENOUS
Status: DISCONTINUED | OUTPATIENT
Start: 2020-06-05 | End: 2020-06-05 | Stop reason: SDUPTHER

## 2020-06-05 RX ADMIN — SODIUM CHLORIDE 1000 ML: 900 INJECTION, SOLUTION INTRAVENOUS at 20:03

## 2020-06-05 RX ADMIN — VALPROATE SODIUM 500 MG: 100 INJECTION, SOLUTION INTRAVENOUS at 20:31

## 2020-06-05 RX ADMIN — HYDROMORPHONE HYDROCHLORIDE 2 MG: 1 INJECTION, SOLUTION INTRAMUSCULAR; INTRAVENOUS; SUBCUTANEOUS at 21:26

## 2020-06-05 RX ADMIN — METOCLOPRAMIDE 10 MG: 5 INJECTION, SOLUTION INTRAMUSCULAR; INTRAVENOUS at 21:58

## 2020-06-05 RX ADMIN — BUPIVACAINE HYDROCHLORIDE 25 MG: 2.5 INJECTION, SOLUTION EPIDURAL; INFILTRATION; INTRACAUDAL; PERINEURAL at 19:55

## 2020-06-05 NOTE — ED PROVIDER NOTES
49-year-old white female history of chronic migraines returns for her fourth ER visit in 4 days due to continued headache. Headache is bilateral frontal and radiates toward the back. She has had some nausea and vomiting. No fever. No neck stiffness. No rashes. She has never had a headache last this long. She reports on previous ER visits the headache improved but did never completely resolved. The history is provided by the patient. Headache    Associated symptoms include nausea and vomiting. Pertinent negatives include no fever and no shortness of breath.         Past Medical History:   Diagnosis Date    Neurological disorder     Other ill-defined conditions(799.89)     narcalepsy    Other ill-defined conditions(799.89)     fibromyalgia    Other ill-defined conditions(799.89)     PCOS (polycystic ovarian syndrome)        Past Surgical History:   Procedure Laterality Date    HX CHOLECYSTECTOMY      HX WISDOM TEETH EXTRACTION           Family History:   Problem Relation Age of Onset    Diabetes Father     Hypertension Father     Breast Cancer Neg Hx     Ovarian Cancer Neg Hx     Colon Cancer Neg Hx        Social History     Socioeconomic History    Marital status: SINGLE     Spouse name: Not on file    Number of children: Not on file    Years of education: Not on file    Highest education level: Not on file   Occupational History    Not on file   Social Needs    Financial resource strain: Not on file    Food insecurity     Worry: Not on file     Inability: Not on file    Transportation needs     Medical: Not on file     Non-medical: Not on file   Tobacco Use    Smoking status: Never Smoker    Smokeless tobacco: Never Used   Substance and Sexual Activity    Alcohol use: No    Drug use: No    Sexual activity: Not Currently   Lifestyle    Physical activity     Days per week: Not on file     Minutes per session: Not on file    Stress: Not on file   Relationships    Social connections     Talks on phone: Not on file     Gets together: Not on file     Attends Caodaism service: Not on file     Active member of club or organization: Not on file     Attends meetings of clubs or organizations: Not on file     Relationship status: Not on file    Intimate partner violence     Fear of current or ex partner: Not on file     Emotionally abused: Not on file     Physically abused: Not on file     Forced sexual activity: Not on file   Other Topics Concern    Not on file   Social History Narrative    Not on file         ALLERGIES: Latex, natural rubber; Amoxicillin; Doxycycline; Epinephrine; Gluten; Lyrica [pregabalin]; Neosporin [benzalkonium chloride]; Penicillins; Sulfa (sulfonamide antibiotics); and Zithromax [azithromycin]    Review of Systems   Constitutional: Negative for fever. HENT: Negative for congestion. Respiratory: Negative for cough and shortness of breath. Cardiovascular: Negative for chest pain. Gastrointestinal: Positive for nausea and vomiting. Negative for abdominal pain. Genitourinary: Negative for dysuria. Musculoskeletal: Negative for back pain and neck pain. Skin: Negative for rash. Neurological: Positive for headaches. Vitals:    06/04/20 1914 06/04/20 2140   BP: (!) 137/102 113/65   Pulse: 98 73   Resp: 16 16   Temp: 98.4 °F (36.9 °C) 98.1 °F (36.7 °C)   SpO2: 99% 99%   Weight: 99.8 kg (220 lb)    Height: 5' 8\" (1.727 m)             Physical Exam  Vitals signs and nursing note reviewed. Constitutional:       General: She is not in acute distress. Appearance: Normal appearance. She is not toxic-appearing. HENT:      Head: Normocephalic and atraumatic. Nose: Nose normal.      Mouth/Throat:      Mouth: Mucous membranes are moist.      Pharynx: Oropharynx is clear. Eyes:      Extraocular Movements: Extraocular movements intact. Pupils: Pupils are equal, round, and reactive to light.       Comments: Funduscopic exam shows no hemorrhages or edema   Neck:      Musculoskeletal: Normal range of motion and neck supple. Cardiovascular:      Rate and Rhythm: Normal rate and regular rhythm. Pulses: Normal pulses. Pulmonary:      Effort: Pulmonary effort is normal.      Breath sounds: Normal breath sounds. Abdominal:      General: There is no distension. Palpations: Abdomen is soft. Tenderness: There is no abdominal tenderness. Musculoskeletal: Normal range of motion. General: No swelling. Skin:     General: Skin is warm and dry. Neurological:      Mental Status: She is alert and oriented to person, place, and time. Cranial Nerves: No cranial nerve deficit. Sensory: No sensory deficit. Motor: No weakness. Coordination: Coordination normal.      Deep Tendon Reflexes: Reflexes normal.   Psychiatric:         Mood and Affect: Mood normal.         Behavior: Behavior normal.          MDM  Number of Diagnoses or Management Options  Nonintractable headache, unspecified chronicity pattern, unspecified headache type:   Urinary tract infection without hematuria, site unspecified:   Diagnosis management comments: Blood work all unremarkable. Urinalysis concerning for UTI with 20-50 white cells and bacteria. Head CT no acute abnormality. Patient has been treated with IV fluids, morphine, Zofran, Imitrex, doxycycline and Solu-Medrol. At this time she does not have any meningismus. No fever no leukocytosis. Will place on antibiotics for UTI.        Amount and/or Complexity of Data Reviewed  Clinical lab tests: ordered and reviewed  Tests in the radiology section of CPT®: ordered and reviewed  Tests in the medicine section of CPT®: ordered and reviewed  Independent visualization of images, tracings, or specimens: yes    Risk of Complications, Morbidity, and/or Mortality  Presenting problems: moderate  Diagnostic procedures: moderate  Management options: moderate           Procedures

## 2020-06-05 NOTE — ED TRIAGE NOTES
Patient advises history of migraines, started 6 days ago, advises that she has been here past 4 days for same. Patient states she has appointment at infusion center tomorrow for migraine protocols. Patient with mask on during triage. Patient with no further complaints.

## 2020-06-05 NOTE — LETTER
NOTIFICATION RETURN TO WORK / SCHOOL 
 
6/5/2020 10:23 PM 
 
Ms. Aruna Henry [address] [place] [address] [place] [id number] To Whom It May Concern: 
 
Janelle Dickerson is currently under the care of [facility] EMERGENCY DEPT. She will return to work/school on: 06/07/2020 Aruna Henry may return to work/school with the following restrictions: \ 
NO RESTRICTIONS If there are questions or concerns please have the patient contact our office. Sincerely, Ramsey Chatterjee

## 2020-06-05 NOTE — ED PROVIDER NOTES
[facility] Emergency Department  Arrival Date/Time: 6/5/2020 @ [address]  MRN: [id number]      26 y.o. female    YOB: 1990   [phone] (home)     [facility] EMERGENCY DEPT ER01/01  Seen on 6/5/2020 @ 7:54 PM      Today's Chief Complaint:   Chief Complaint   Patient presents with    Migraine     HPI: 56-year-old female with 4 visits in 4 days for persistent headache. Patient with history of migraines. She occasionally has these burst of headache. This 1 started 4 days ago. She has been seen here daily. She spoke with her neurologist she is scheduled to come back to the infusion center tomorrow for a migraine cocktail but had increasing pain primarily behind the right eye associated with nausea photophobia. No alleviating. She is taken sumatriptan 100 mg x 2 Fioricet without improvement. HPI    Review of Systems: Review of Systems   Constitutional: Negative for activity change and appetite change. HENT: Negative. Respiratory: Negative. Cardiovascular: Negative. Gastrointestinal: Positive for nausea and vomiting. Musculoskeletal: Negative. Skin: Negative. Neurological: Positive for headaches. Psychiatric/Behavioral: Negative. Past Medical History: Primary Care Doctor: Other, MD Pepe  Meds, PMH, PSHx, SocHx at end of this note     Allergies: Allergies   Allergen Reactions    Latex, Natural Rubber Contact Dermatitis    Amoxicillin Rash    Doxycycline Other (comments)     \"hyper\"    Epinephrine Nausea and Vomiting     PLEASE REMOVE FROM ALLERGY LIST    Gluten Nausea Only    Lyrica [Pregabalin] Unknown (comments)     hallucinations    Neosporin [Benzalkonium Chloride] Hives    Penicillins Rash    Sulfa (Sulfonamide Antibiotics) Rash    Zithromax [Azithromycin] Hives         Key Anti-Platelet Anticoagulant Meds     The patient is on no antiplatelet meds or anticoagulants. Physical Exam:  Nursing documentation reviewed. Patient Vitals for the past 24 hrs:   Temp Pulse Resp BP SpO2   06/05/20 1945 -- 83 -- -- 96 %   06/05/20 1944 -- 79 18 107/53 97 %   06/05/20 1944 -- -- -- 107/53 --   06/05/20 1813 98.6 °F (37 °C) 94 16 118/83 98 %    Vital signs were reviewed. Physical Exam  Vitals signs and nursing note reviewed. Constitutional:       General: She is not in acute distress. Appearance: Normal appearance. She is not ill-appearing. HENT:      Head: Normocephalic and atraumatic. Eyes:      Pupils: Pupils are equal, round, and reactive to light. Cardiovascular:      Rate and Rhythm: Normal rate and regular rhythm. Pulses: Normal pulses. Heart sounds: Normal heart sounds. Pulmonary:      Effort: No respiratory distress. Breath sounds: Normal breath sounds. Abdominal:      General: There is no distension. Tenderness: There is no abdominal tenderness. There is no guarding. Skin:     General: Skin is warm and dry. Capillary Refill: Capillary refill takes less than 2 seconds. Neurological:      General: No focal deficit present. Mental Status: She is alert and oriented to person, place, and time. Comments: Facial movements are intact. Extraocular movements are intact. Psychiatric:         Mood and Affect: Mood normal.         Behavior: Behavior normal.         MEDICAL DECISION MAKING: (Including Differential Dx, and Plan)   MDM  Number of Diagnoses or Management Options  Diagnosis management comments: 38-year-old female with status migraine    We will give her Depacon IV monitor for improvement.     Risk of Complications, Morbidity, and/or Mortality  Presenting problems: moderate  Diagnostic procedures: minimal  Management options: high            Data/Management:  (.rony)   Lab findings during this visit:   Recent Results (from the past 48 hour(s))   CBC WITH AUTOMATED DIFF    Collection Time: 06/04/20  8:18 PM   Result Value Ref Range    WBC 8.4 4.3 - 11.1 K/uL    RBC 4.07 4.05 - 5.2 M/uL    HGB 12.4 11.7 - 15.4 g/dL    HCT 37.6 35.8 - 46.3 %    MCV 92.4 79.6 - 97.8 FL    MCH 30.5 26.1 - 32.9 PG    MCHC 33.0 31.4 - 35.0 g/dL    RDW 12.2 11.9 - 14.6 %    PLATELET 903 992 - 032 K/uL    MPV 9.8 9.4 - 12.3 FL    ABSOLUTE NRBC 0.00 0.0 - 0.2 K/uL    DF AUTOMATED      NEUTROPHILS 56 43 - 78 %    LYMPHOCYTES 35 13 - 44 %    MONOCYTES 9 4.0 - 12.0 %    EOSINOPHILS 1 0.5 - 7.8 %    BASOPHILS 0 0.0 - 2.0 %    IMMATURE GRANULOCYTES 0 0.0 - 5.0 %    ABS. NEUTROPHILS 4.7 1.7 - 8.2 K/UL    ABS. LYMPHOCYTES 2.9 0.5 - 4.6 K/UL    ABS. MONOCYTES 0.7 0.1 - 1.3 K/UL    ABS. EOSINOPHILS 0.0 0.0 - 0.8 K/UL    ABS. BASOPHILS 0.0 0.0 - 0.2 K/UL    ABS. IMM. GRANS. 0.0 0.0 - 0.5 K/UL   METABOLIC PANEL, BASIC    Collection Time: 06/04/20  8:18 PM   Result Value Ref Range    Sodium 142 136 - 145 mmol/L    Potassium 3.8 3.5 - 5.1 mmol/L    Chloride 109 (H) 98 - 107 mmol/L    CO2 30 21 - 32 mmol/L    Anion gap 3 (L) 7 - 16 mmol/L    Glucose 84 65 - 100 mg/dL    BUN 19 6 - 23 MG/DL    Creatinine 0.86 0.6 - 1.0 MG/DL    GFR est AA >60 >60 ml/min/1.73m2    GFR est non-AA >60 >60 ml/min/1.73m2    Calcium 8.3 8.3 - 10.4 MG/DL   HEPATIC FUNCTION PANEL    Collection Time: 06/04/20  8:18 PM   Result Value Ref Range    Protein, total 6.2 (L) 6.3 - 8.2 g/dL    Albumin 3.3 (L) 3.5 - 5.0 g/dL    Globulin 2.9 2.3 - 3.5 g/dL    A-G Ratio 1.1 (L) 1.2 - 3.5      Bilirubin, total 0.2 0.2 - 1.1 MG/DL    Bilirubin, direct 0.1 <0.4 MG/DL    Alk. phosphatase 38 (L) 50 - 136 U/L    AST (SGOT) 9 (L) 15 - 37 U/L    ALT (SGPT) 25 12 - 65 U/L   HCG URINE, QL. - POC    Collection Time: 06/04/20  8:38 PM   Result Value Ref Range    Pregnancy test,urine (POC) Negative NEG     URINE MICROSCOPIC    Collection Time: 06/04/20  8:39 PM   Result Value Ref Range    WBC 20-50 0 /hpf    RBC 0-3 0 /hpf    Epithelial cells 3-5 0 /hpf    Bacteria TRACE 0 /hpf    Casts 3-5 0 /lpf     Radiology studies during this visit: No results found.   Medications given in the ED:   Medications   bupivacaine (PF) (MARCAINE) 0.25 % (2.5 mg/mL) injection 25 mg (has no administration in time range)   metoclopramide HCl (REGLAN) injection 10 mg (has no administration in time range)   0.9% sodium chloride infusion 1,000 mL (1,000 mL IntraVENous New Bag 6/5/20 2003)   valproate (DEPACON) 500 mg in 0.9% sodium chloride 50 mL IVPB (500 mg IntraVENous New Bag 6/5/20 2031)   HYDROmorphone (PF) (DILAUDID) injection 2 mg (2 mg IntraMUSCular Given 6/5/20 2126)        Procedure Documentation:   Procedures     Recheck and Additional Documentation:  (use .addrecheck  . addsepsis   . addstroke   . addhip  . addhandoff  . addcctime . emergcnt )     Pt resting  Head is getting better  D/c home with reglan     Other ED Course Notes:     ED Course as of Jun 05 2152 Fri Jun 05, 2020 2038 Right posterior paracervical injection  Confirmed patient and allergies  Injected 4ml into neck    [GH]      ED Course User Index  [GH] Antonio Patten MD       I wore appropriate PPE throughout this patient's ED encounter. Assessment and Plan: (.adddispohome)   Impression:     ICD-10-CM ICD-9-CM    1. Status migrainosus G43.901 346.20       Disposition: Home   Follow-up Information     Follow up With Specialties Details Why [facility] EMERGENCY DEPT Emergency Medicine  come back if you get worse or have any new problems [address] [place] [id number]  [address] [place] [address], [address] [place] [address],  Neurology   [address] [place] [address],[address]  [address] [place] [id number]  [phone]          Current Discharge Medication List      START taking these medications    Details   metoclopramide HCl (Reglan) 10 mg tablet Take 1 Tab by mouth every six (6) hours as needed for Nausea for up to 20 doses.   Qty: 12 Tab, Refills: 0               Past Medical History:      Past Medical History:   Diagnosis Date    Neurological disorder     Other ill-defined conditions(914.56)     narcalepsy    Other ill-defined conditions(799.89)     fibromyalgia    Other ill-defined conditions(799.89)     PCOS (polycystic ovarian syndrome)      Past Surgical History:   Procedure Laterality Date    HX CHOLECYSTECTOMY      HX WISDOM TEETH EXTRACTION       Social History     Tobacco Use    Smoking status: Never Smoker    Smokeless tobacco: Never Used   Substance Use Topics    Alcohol use: No    Drug use: No     Prior to Admission Medications   Prescriptions Last Dose Informant Patient Reported? Taking? EPINEPHrine (EPIPEN) 0.3 mg/0.3 mL injection   Yes No   Si.3 mg by IntraMUSCular route once as needed. OXcarbazepine (TRILEPTAL) 150 mg tablet   No No   Sig: Take 3 bid   SUMAtriptan (Imitrex) 100 mg tablet   No No   Sig: Take 1 Tab by mouth two (2) times daily as needed for Migraine. butalbital-acetaminophen-caff (FIORICET) -40 mg per capsule   No No   Sig: Take 1 Cap by mouth three (3) times daily. citalopram (CELEXA) 40 mg tablet   Yes No   Sig: Take 40 mg by mouth daily. cyclobenzaprine (FLEXERIL) 10 mg tablet   Yes No   Sig: Take  by mouth three (3) times daily as needed for Muscle Spasm(s). diphenhydrAMINE (BENADRYL ALLERGY) 25 mg tablet   No No   Sig: Take 1 Tab by mouth every six (6) hours as needed for Sleep. doxycycline (VIBRA-TABS) 100 mg tablet   No No   Sig: Take 1 Tab by mouth two (2) times a day for 7 days. loratadine (CLARITIN) 10 mg tablet   Yes No   Sig: Take 10 mg by mouth daily. magnesium oxide 500 mg tab   Yes No   Sig: Take  by mouth. naproxen (NAPROSYN) 500 mg tablet   Yes No   Sig: Take 500 mg by mouth two (2) times daily (with meals). ondansetron hcl (ZOFRAN, AS HYDROCHLORIDE,) 8 mg tablet   Yes No   Sig: Take 8 mg by mouth every eight (8) hours as needed for Nausea. predniSONE (STERAPRED DS) 10 mg dose pack   No No   Sig: Please take as directed on package.  Please clarify any questions with the Pharmacist.   topiramate (TOPAMAX) 100 mg tablet   Yes No Sig: Take  by mouth two (2) times a day. topiramate (TOPAMAX) 100 mg tablet   No No   Sig: Take 1.5 tabs bid   topiramate (TOPAMAX) 50 mg tablet   Yes No   Sig: Take  by mouth two (2) times a day.       Facility-Administered Medications: None

## 2020-06-05 NOTE — ED NOTES
I have reviewed discharge instructions with the patient. The patient verbalized understanding. Patient left ED via Discharge Method: ambulatory to Home with mom. Opportunity for questions and clarification provided. Patient given 1 scripts. To continue your aftercare when you leave the hospital, you may receive an automated call from our care team to check in on how you are doing. This is a free service and part of our promise to provide the best care and service to meet your aftercare needs.  If you have questions, or wish to unsubscribe from this service please call [phone]. Thank you for Choosing our [address] [place] [address] Emergency Department.

## 2020-06-05 NOTE — DISCHARGE INSTRUCTIONS
Patient Education        Headache: Care Instructions  Your Care Instructions     Headaches have many possible causes. Most headaches aren't a sign of a more serious problem, and they will get better on their own. Home treatment may help you feel better faster. The doctor has checked you carefully, but problems can develop later. If you notice any problems or new symptoms, get medical treatment right away. Follow-up care is a key part of your treatment and safety. Be sure to make and go to all appointments, and call your doctor if you are having problems. It's also a good idea to know your test results and keep a list of the medicines you take. How can you care for yourself at home? · Do not drive if you have taken a prescription pain medicine. · Rest in a quiet, dark room until your headache is gone. Close your eyes and try to relax or go to sleep. Don't watch TV or read. · Put a cold, moist cloth or cold pack on the painful area for 10 to 20 minutes at a time. Put a thin cloth between the cold pack and your skin. · Use a warm, moist towel or a heating pad set on low to relax tight shoulder and neck muscles. · Have someone gently massage your neck and shoulders. · Take pain medicines exactly as directed. ? If the doctor gave you a prescription medicine for pain, take it as prescribed. ? If you are not taking a prescription pain medicine, ask your doctor if you can take an over-the-counter medicine. · Be careful not to take pain medicine more often than the instructions allow, because you may get worse or more frequent headaches when the medicine wears off. · Do not ignore new symptoms that occur with a headache, such as a fever, weakness or numbness, vision changes, or confusion. These may be signs of a more serious problem. To prevent headaches  · Keep a headache diary so you can figure out what triggers your headaches. Avoiding triggers may help you prevent headaches.  Record when each headache began, how long it lasted, and what the pain was like (throbbing, aching, stabbing, or dull). Write down any other symptoms you had with the headache, such as nausea, flashing lights or dark spots, or sensitivity to bright light or loud noise. Note if the headache occurred near your period. List anything that might have triggered the headache, such as certain foods (chocolate, cheese, wine) or odors, smoke, bright light, stress, or lack of sleep. · Find healthy ways to deal with stress. Headaches are most common during or right after stressful times. Take time to relax before and after you do something that has caused a headache in the past.  · Try to keep your muscles relaxed by keeping good posture. Check your jaw, face, neck, and shoulder muscles for tension, and try relaxing them. When sitting at a desk, change positions often, and stretch for 30 seconds each hour. · Get plenty of sleep and exercise. · Eat regularly and well. Long periods without food can trigger a headache. · Treat yourself to a massage. Some people find that regular massages are very helpful in relieving tension. · Limit caffeine by not drinking too much coffee, tea, or soda. But don't quit caffeine suddenly, because that can also give you headaches. · Reduce eyestrain from computers by blinking frequently and looking away from the computer screen every so often. Make sure you have proper eyewear and that your monitor is set up properly, about an arm's length away. · Seek help if you have depression or anxiety. Your headaches may be linked to these conditions. Treatment can both prevent headaches and help with symptoms of anxiety or depression. When should you call for help? [id number] anytime you think you may need emergency care. For example, call if:  · You have signs of a stroke. These may include:  ? Sudden numbness, paralysis, or weakness in your face, arm, or leg, especially on only one side of your body.   ? Sudden vision changes. ? Sudden trouble speaking. ? Sudden confusion or trouble understanding simple statements. ? Sudden problems with walking or balance. ? A sudden, severe headache that is different from past headaches. Call your doctor now or seek immediate medical care if:  · You have a new or worse headache. · Your headache gets much worse. Where can you learn more? Go to http://raji-aurea.info/  Enter M271 in the search box to learn more about \"Headache: Care Instructions. \"  Current as of: November 20, 2019               Content Version: 12.5  © 2947-8825 [employer]. Care instructions adapted under license by [employer] (which disclaims liability or warranty for this information). If you have questions about a medical condition or this instruction, always ask your healthcare professional. [address] any warranty or liability for your use of this information. Patient Education        Urinary Tract Infection in Women: Care Instructions  Your Care Instructions     A urinary tract infection, or UTI, is a general term for an infection anywhere between the kidneys and the urethra (where urine comes out). Most UTIs are bladder infections. They often cause pain or burning when you urinate. UTIs are caused by bacteria and can be cured with antibiotics. Be sure to complete your treatment so that the infection goes away. Follow-up care is a key part of your treatment and safety. Be sure to make and go to all appointments, and call your doctor if you are having problems. It's also a good idea to know your test results and keep a list of the medicines you take. How can you care for yourself at home? · Take your antibiotics as directed. Do not stop taking them just because you feel better. You need to take the full course of antibiotics. · Drink extra water and other fluids for the next day or two.  This may help wash out the bacteria that are causing the infection. (If you have kidney, heart, or liver disease and have to limit fluids, talk with your doctor before you increase your fluid intake.)  · Avoid drinks that are carbonated or have caffeine. They can irritate the bladder. · Urinate often. Try to empty your bladder each time. · To relieve pain, take a hot bath or lay a heating pad set on low over your lower belly or genital area. Never go to sleep with a heating pad in place. To prevent UTIs  · Drink plenty of water each day. This helps you urinate often, which clears bacteria from your system. (If you have kidney, heart, or liver disease and have to limit fluids, talk with your doctor before you increase your fluid intake.)  · Urinate when you need to. · Urinate right after you have sex. · Change sanitary pads often. · Avoid douches, bubble baths, feminine hygiene sprays, and other feminine hygiene products that have deodorants. · After going to the bathroom, wipe from front to back. When should you call for help? Call your doctor now or seek immediate medical care if:  · Symptoms such as fever, chills, nausea, or vomiting get worse or appear for the first time. · You have new pain in your back just below your rib cage. This is called flank pain. · There is new blood or pus in your urine. · You have any problems with your antibiotic medicine. Watch closely for changes in your health, and be sure to contact your doctor if:  · You are not getting better after taking an antibiotic for 2 days. · Your symptoms go away but then come back. Where can you learn more? Go to http://raji-aurea.info/  Enter L634 in the search box to learn more about \"Urinary Tract Infection in Women: Care Instructions. \"  Current as of: August 22, 2019               Content Version: 12.5  © 2226-2116 [employer].    Care instructions adapted under license by [employer] (which disclaims liability or warranty for this information). If you have questions about a medical condition or this instruction, always ask your healthcare professional. [place] [address] any warranty or liability for your use of this information.

## 2020-06-06 ENCOUNTER — HOSPITAL ENCOUNTER (EMERGENCY)
Age: 30
Discharge: HOME | End: 2020-06-07
Attending: EMERGENCY MEDICINE
Payer: SELF-PAY

## 2020-06-06 ENCOUNTER — HOSPITAL ENCOUNTER (OUTPATIENT)
Dept: INFUSION THERAPY | Age: 30
Discharge: HOME | End: 2020-06-06
Payer: SELF-PAY

## 2020-06-06 VITALS
SYSTOLIC BLOOD PRESSURE: 108 MMHG | HEART RATE: 88 BPM | OXYGEN SATURATION: 98 % | TEMPERATURE: 98.2 F | DIASTOLIC BLOOD PRESSURE: 55 MMHG | RESPIRATION RATE: 18 BRPM

## 2020-06-06 DIAGNOSIS — G43.901 STATUS MIGRAINOSUS: Primary | ICD-10-CM

## 2020-06-06 DIAGNOSIS — R11.2 NON-INTRACTABLE VOMITING WITH NAUSEA, UNSPECIFIED VOMITING TYPE: ICD-10-CM

## 2020-06-06 LAB
ALBUMIN SERPL-MCNC: 3.5 G/DL (ref 3.5–5)
ALBUMIN/GLOB SERPL: 1.1 {RATIO} (ref 1.2–3.5)
ALP SERPL-CCNC: 41 U/L (ref 50–136)
ALT SERPL-CCNC: 31 U/L (ref 12–65)
ANION GAP SERPL CALC-SCNC: 8 MMOL/L (ref 7–16)
AST SERPL-CCNC: 9 U/L (ref 15–37)
BASOPHILS # BLD: 0 K/UL (ref 0–0.2)
BASOPHILS NFR BLD: 0 % (ref 0–2)
BILIRUB SERPL-MCNC: 0.2 MG/DL (ref 0.2–1.1)
BUN SERPL-MCNC: 16 MG/DL (ref 6–23)
CALCIUM SERPL-MCNC: 8.6 MG/DL (ref 8.3–10.4)
CHLORIDE SERPL-SCNC: 106 MMOL/L (ref 98–107)
CO2 SERPL-SCNC: 25 MMOL/L (ref 21–32)
CREAT SERPL-MCNC: 1.01 MG/DL (ref 0.6–1)
DIFFERENTIAL METHOD BLD: ABNORMAL
EOSINOPHIL # BLD: 0.1 K/UL (ref 0–0.8)
EOSINOPHIL NFR BLD: 2 % (ref 0.5–7.8)
ERYTHROCYTE [DISTWIDTH] IN BLOOD BY AUTOMATED COUNT: 12.1 % (ref 11.9–14.6)
GLOBULIN SER CALC-MCNC: 3.3 G/DL (ref 2.3–3.5)
GLUCOSE SERPL-MCNC: 239 MG/DL (ref 65–100)
HCG UR QL: NEGATIVE
HCT VFR BLD AUTO: 38.7 % (ref 35.8–46.3)
HGB BLD-MCNC: 13.1 G/DL (ref 11.7–15.4)
IMM GRANULOCYTES # BLD AUTO: 0 K/UL (ref 0–0.5)
IMM GRANULOCYTES NFR BLD AUTO: 0 % (ref 0–5)
LYMPHOCYTES # BLD: 0.5 K/UL (ref 0.5–4.6)
LYMPHOCYTES NFR BLD: 7 % (ref 13–44)
MCH RBC QN AUTO: 30.7 PG (ref 26.1–32.9)
MCHC RBC AUTO-ENTMCNC: 33.9 G/DL (ref 31.4–35)
MCV RBC AUTO: 90.6 FL (ref 79.6–97.8)
MONOCYTES # BLD: 0.1 K/UL (ref 0.1–1.3)
MONOCYTES NFR BLD: 1 % (ref 4–12)
NEUTS SEG # BLD: 6.2 K/UL (ref 1.7–8.2)
NEUTS SEG NFR BLD: 90 % (ref 43–78)
NRBC # BLD: 0 K/UL (ref 0–0.2)
PLATELET # BLD AUTO: 253 K/UL (ref 150–450)
PMV BLD AUTO: 9.8 FL (ref 9.4–12.3)
POTASSIUM SERPL-SCNC: 4.1 MMOL/L (ref 3.5–5.1)
PROT SERPL-MCNC: 6.8 G/DL (ref 6.3–8.2)
RBC # BLD AUTO: 4.27 M/UL (ref 4.05–5.2)
SODIUM SERPL-SCNC: 139 MMOL/L (ref 136–145)
WBC # BLD AUTO: 6.9 K/UL (ref 4.3–11.1)

## 2020-06-06 PROCEDURE — 96372 THER/PROPH/DIAG INJ SC/IM: CPT

## 2020-06-06 PROCEDURE — 99284 EMERGENCY DEPT VISIT MOD MDM: CPT

## 2020-06-06 PROCEDURE — 85025 COMPLETE CBC W/AUTO DIFF WBC: CPT

## 2020-06-06 PROCEDURE — 96367 TX/PROPH/DG ADDL SEQ IV INF: CPT

## 2020-06-06 PROCEDURE — 96375 TX/PRO/DX INJ NEW DRUG ADDON: CPT

## 2020-06-06 PROCEDURE — 80053 COMPREHEN METABOLIC PANEL: CPT

## 2020-06-06 PROCEDURE — 74011000258 HC RX REV CODE- 258: Performed by: PSYCHIATRY & NEUROLOGY

## 2020-06-06 PROCEDURE — 74011000250 HC RX REV CODE- 250: Performed by: EMERGENCY MEDICINE

## 2020-06-06 PROCEDURE — 74011250636 HC RX REV CODE- 250/636: Performed by: PSYCHIATRY & NEUROLOGY

## 2020-06-06 PROCEDURE — 96365 THER/PROPH/DIAG IV INF INIT: CPT

## 2020-06-06 PROCEDURE — 81003 URINALYSIS AUTO W/O SCOPE: CPT

## 2020-06-06 PROCEDURE — 74011250636 HC RX REV CODE- 250/636: Performed by: EMERGENCY MEDICINE

## 2020-06-06 PROCEDURE — 81025 URINE PREGNANCY TEST: CPT

## 2020-06-06 PROCEDURE — 74011000250 HC RX REV CODE- 250: Performed by: PSYCHIATRY & NEUROLOGY

## 2020-06-06 PROCEDURE — 74011636637 HC RX REV CODE- 636/637: Performed by: EMERGENCY MEDICINE

## 2020-06-06 RX ORDER — BENZTROPINE MESYLATE 1 MG/ML
2 INJECTION INTRAMUSCULAR; INTRAVENOUS ONCE
Status: COMPLETED | OUTPATIENT
Start: 2020-06-06 | End: 2020-06-06

## 2020-06-06 RX ORDER — SODIUM CHLORIDE 0.9 % (FLUSH) 0.9 %
5-40 SYRINGE (ML) INJECTION EVERY 8 HOURS
Status: DISCONTINUED | OUTPATIENT
Start: 2020-06-06 | End: 2020-06-07 | Stop reason: HOSPADM

## 2020-06-06 RX ORDER — KETOROLAC TROMETHAMINE 30 MG/ML
60 INJECTION, SOLUTION INTRAMUSCULAR; INTRAVENOUS ONCE
Status: COMPLETED | OUTPATIENT
Start: 2020-06-06 | End: 2020-06-06

## 2020-06-06 RX ORDER — DIPHENHYDRAMINE HYDROCHLORIDE 50 MG/ML
25 INJECTION, SOLUTION INTRAMUSCULAR; INTRAVENOUS ONCE
Status: COMPLETED | OUTPATIENT
Start: 2020-06-06 | End: 2020-06-06

## 2020-06-06 RX ORDER — SUMATRIPTAN 6 MG/.5ML
6 INJECTION, SOLUTION SUBCUTANEOUS
Status: COMPLETED | OUTPATIENT
Start: 2020-06-06 | End: 2020-06-06

## 2020-06-06 RX ORDER — VALPROATE SODIUM 100 MG/ML
500 INJECTION INTRAVENOUS
Status: DISCONTINUED | OUTPATIENT
Start: 2020-06-06 | End: 2020-06-06

## 2020-06-06 RX ORDER — DEXAMETHASONE SODIUM PHOSPHATE 100 MG/10ML
10 INJECTION INTRAMUSCULAR; INTRAVENOUS ONCE
Status: COMPLETED | OUTPATIENT
Start: 2020-06-06 | End: 2020-06-06

## 2020-06-06 RX ORDER — MAGNESIUM SULFATE HEPTAHYDRATE 40 MG/ML
2 INJECTION, SOLUTION INTRAVENOUS ONCE
Status: COMPLETED | OUTPATIENT
Start: 2020-06-06 | End: 2020-06-06

## 2020-06-06 RX ORDER — SODIUM CHLORIDE 0.9 % (FLUSH) 0.9 %
5-40 SYRINGE (ML) INJECTION AS NEEDED
Status: DISCONTINUED | OUTPATIENT
Start: 2020-06-06 | End: 2020-06-07 | Stop reason: HOSPADM

## 2020-06-06 RX ADMIN — BENZTROPINE MESYLATE 2 MG: 1 INJECTION INTRAMUSCULAR; INTRAVENOUS at 23:46

## 2020-06-06 RX ADMIN — DEXAMETHASONE SODIUM PHOSPHATE 10 MG: 10 INJECTION INTRAMUSCULAR; INTRAVENOUS at 23:46

## 2020-06-06 RX ADMIN — SODIUM CHLORIDE 1000 ML: 900 INJECTION, SOLUTION INTRAVENOUS at 23:41

## 2020-06-06 RX ADMIN — SODIUM CHLORIDE: 900 INJECTION, SOLUTION INTRAVENOUS at 15:35

## 2020-06-06 RX ADMIN — DIPHENHYDRAMINE HYDROCHLORIDE 25 MG: 50 INJECTION, SOLUTION INTRAMUSCULAR; INTRAVENOUS at 15:30

## 2020-06-06 RX ADMIN — SUMATRIPTAN 6 MG: 6 INJECTION, SOLUTION SUBCUTANEOUS at 23:45

## 2020-06-06 RX ADMIN — MAGNESIUM SULFATE HEPTAHYDRATE 2 G: 40 INJECTION, SOLUTION INTRAVENOUS at 16:20

## 2020-06-06 RX ADMIN — PROMETHAZINE HYDROCHLORIDE 25 MG: 25 INJECTION INTRAMUSCULAR; INTRAVENOUS at 15:15

## 2020-06-06 RX ADMIN — PROCHLORPERAZINE EDISYLATE 10 MG: 5 INJECTION INTRAMUSCULAR; INTRAVENOUS at 23:42

## 2020-06-06 RX ADMIN — KETOROLAC TROMETHAMINE 60 MG: 30 INJECTION, SOLUTION INTRAMUSCULAR at 15:27

## 2020-06-06 NOTE — DISCHARGE INSTRUCTIONS
Drink plenty of fluids  Take medications as directed     Look into alternative treatments for migraine -- like accupuncture

## 2020-06-06 NOTE — ED NOTES
I have reviewed discharge instructions with the patient. The patient verbalized understanding. Patient left ED via car to home with . Opportunity for questions and clarification provided. Patient given 0 scripts. To continue your aftercare when you leave the hospital, you may receive an automated call from our care team to check in on how you are doing. This is a free service and part of our promise to provide the best care and service to meet your aftercare needs.  If you have questions, or wish to unsubscribe from this service please call [phone]. Thank you for Choosing our [facility] Emergency Department.

## 2020-06-07 VITALS
HEIGHT: 68 IN | TEMPERATURE: 97.8 F | BODY MASS INDEX: 33.34 KG/M2 | SYSTOLIC BLOOD PRESSURE: 109 MMHG | HEART RATE: 74 BPM | RESPIRATION RATE: 16 BRPM | OXYGEN SATURATION: 98 % | WEIGHT: 220 LBS | DIASTOLIC BLOOD PRESSURE: 57 MMHG

## 2020-06-07 VITALS
SYSTOLIC BLOOD PRESSURE: 122 MMHG | DIASTOLIC BLOOD PRESSURE: 72 MMHG | OXYGEN SATURATION: 99 % | TEMPERATURE: 98.2 F | HEART RATE: 72 BPM | RESPIRATION RATE: 17 BRPM | WEIGHT: 220.02 LBS | HEIGHT: 68 IN | BODY MASS INDEX: 33.35 KG/M2

## 2020-06-07 DIAGNOSIS — R51.9 NONINTRACTABLE HEADACHE, UNSPECIFIED CHRONICITY PATTERN, UNSPECIFIED HEADACHE TYPE: Primary | ICD-10-CM

## 2020-06-07 LAB
APPEARANCE FLD: COLORLESS
COLOR FLD: CLEAR
GLUCOSE CSF-MCNC: 69 MG/DL (ref 40–70)
NUC CELL # FLD: 0 /CU MM
PROT CSF-MCNC: 29 MG/DL
RBC # FLD: 0 /CU MM
SPECIMEN SOURCE FLD: NORMAL
TUBE # CSF: 2
TUBE # CSF: 2

## 2020-06-07 PROCEDURE — 82945 GLUCOSE OTHER FLUID: CPT

## 2020-06-07 PROCEDURE — 74011000258 HC RX REV CODE- 258: Performed by: EMERGENCY MEDICINE

## 2020-06-07 PROCEDURE — 87205 SMEAR GRAM STAIN: CPT

## 2020-06-07 PROCEDURE — 75810000133 HC LUMBAR PUNCTURE

## 2020-06-07 PROCEDURE — 96374 THER/PROPH/DIAG INJ IV PUSH: CPT

## 2020-06-07 PROCEDURE — 74011000250 HC RX REV CODE- 250: Performed by: EMERGENCY MEDICINE

## 2020-06-07 PROCEDURE — 96375 TX/PRO/DX INJ NEW DRUG ADDON: CPT

## 2020-06-07 PROCEDURE — 89050 BODY FLUID CELL COUNT: CPT

## 2020-06-07 PROCEDURE — 84157 ASSAY OF PROTEIN OTHER: CPT

## 2020-06-07 PROCEDURE — 96361 HYDRATE IV INFUSION ADD-ON: CPT

## 2020-06-07 PROCEDURE — 99284 EMERGENCY DEPT VISIT MOD MDM: CPT

## 2020-06-07 PROCEDURE — 74011250636 HC RX REV CODE- 250/636: Performed by: EMERGENCY MEDICINE

## 2020-06-07 PROCEDURE — 93005 ELECTROCARDIOGRAM TRACING: CPT | Performed by: EMERGENCY MEDICINE

## 2020-06-07 PROCEDURE — 96365 THER/PROPH/DIAG IV INF INIT: CPT

## 2020-06-07 PROCEDURE — 74011250637 HC RX REV CODE- 250/637: Performed by: EMERGENCY MEDICINE

## 2020-06-07 RX ORDER — HALOPERIDOL 5 MG/ML
10 INJECTION INTRAMUSCULAR ONCE
Status: COMPLETED | OUTPATIENT
Start: 2020-06-07 | End: 2020-06-07

## 2020-06-07 RX ORDER — TRAZODONE HYDROCHLORIDE 150 MG/1
150 TABLET ORAL
Qty: 10 TAB | Refills: 0 | Status: SHIPPED | OUTPATIENT
Start: 2020-06-07 | End: 2022-03-25

## 2020-06-07 RX ORDER — DIPHENHYDRAMINE HYDROCHLORIDE 50 MG/ML
25 INJECTION, SOLUTION INTRAMUSCULAR; INTRAVENOUS
Status: COMPLETED | OUTPATIENT
Start: 2020-06-07 | End: 2020-06-07

## 2020-06-07 RX ORDER — PROCHLORPERAZINE MALEATE 10 MG
10 TABLET ORAL
Qty: 8 TAB | Refills: 1 | Status: SHIPPED | OUTPATIENT
Start: 2020-06-07 | End: 2020-06-11

## 2020-06-07 RX ORDER — TRAZODONE HYDROCHLORIDE 50 MG/1
150 TABLET ORAL
Status: COMPLETED | OUTPATIENT
Start: 2020-06-07 | End: 2020-06-07

## 2020-06-07 RX ORDER — SUMATRIPTAN 25 MG/1
25-50 TABLET, FILM COATED ORAL
Qty: 8 TAB | Refills: 1 | Status: SHIPPED | OUTPATIENT
Start: 2020-06-07 | End: 2020-06-07

## 2020-06-07 RX ORDER — BUTALBITAL, ACETAMINOPHEN AND CAFFEINE 300; 40; 50 MG/1; MG/1; MG/1
1-2 CAPSULE ORAL
Qty: 20 CAP | Refills: 0 | Status: SHIPPED | OUTPATIENT
Start: 2020-06-07 | End: 2020-06-11

## 2020-06-07 RX ADMIN — SODIUM CHLORIDE 1000 ML: 900 INJECTION, SOLUTION INTRAVENOUS at 20:13

## 2020-06-07 RX ADMIN — DIPHENHYDRAMINE HYDROCHLORIDE 25 MG: 50 INJECTION, SOLUTION INTRAMUSCULAR; INTRAVENOUS at 20:13

## 2020-06-07 RX ADMIN — HALOPERIDOL LACTATE 10 MG: 5 INJECTION, SOLUTION INTRAMUSCULAR at 20:13

## 2020-06-07 RX ADMIN — Medication 5 ML: at 00:24

## 2020-06-07 RX ADMIN — TRAZODONE HYDROCHLORIDE 150 MG: 50 TABLET ORAL at 01:20

## 2020-06-07 RX ADMIN — VALPROATE SODIUM 500 MG: 100 INJECTION, SOLUTION INTRAVENOUS at 00:23

## 2020-06-07 NOTE — ED TRIAGE NOTES
Presents with headache. Multiple ED visits this week for same problem. No relief with home meds. Mask applied to patient.

## 2020-06-07 NOTE — ED TRIAGE NOTES
Pt here with headache unrelieved by her usual, daily meds. Seen earlier today at infusion center, for referred appointment from her neurologist. Pt has gotten no relief. Pt masked in triage.

## 2020-06-07 NOTE — DISCHARGE INSTRUCTIONS
Trazodone as needed for sleep    For next migraine, start with two excedrin migraines,  If no better proceed to two fioricet  Drink lots of water, extra caffeine may help as well. If still no better, try the compazine,  You may also try the compazine at any point if you become nauseated  Lastly try the _imitrex_ as a last result as it is a pure migraine medicine, and also happens to be the most expensive part of this regimen, so try it last    If still hurting, or can not keep medicines down by mouth, - return to the e.r. Patient Education        Migraine Headache: Care Instructions  Your Care Instructions  Migraines are painful, throbbing headaches that often start on one side of the head. They may cause nausea and vomiting and make you sensitive to light, sound, or smell. Without treatment, migraines can last from 4 hours to a few days. Medicines can help prevent migraines or stop them after they have started. Your doctor can help you find which ones work best for you. Follow-up care is a key part of your treatment and safety. Be sure to make and go to all appointments, and call your doctor if you are having problems. It's also a good idea to know your test results and keep a list of the medicines you take. How can you care for yourself at home? · Do not drive if you have taken a prescription pain medicine. · Rest in a quiet, dark room until your headache is gone. Close your eyes, and try to relax or go to sleep. Don't watch TV or read. · Put a cold, moist cloth or cold pack on the painful area for 10 to 20 minutes at a time. Put a thin cloth between the cold pack and your skin. · Use a warm, moist towel or a heating pad set on low to relax tight shoulder and neck muscles. · Have someone gently massage your neck and shoulders. · Take your medicines exactly as prescribed. Call your doctor if you think you are having a problem with your medicine.  You will get more details on the specific medicines your doctor prescribes. · Don't take medicine for headache pain too often. Talk to your doctor if you are taking medicine more than 2 days a week to stop a headache. Taking too much pain medicine can lead to more headaches. These are called medicine-overuse headaches. To prevent migraines  · Keep a headache diary so you can figure out what triggers your headaches. Avoiding triggers may help you prevent headaches. Record when each headache began, how long it lasted, and what the pain was like. Write down any other symptoms you had with the headache, such as nausea, flashing lights or dark spots, or sensitivity to bright light or loud noise. Note if the headache occurred near your period. List anything that might have triggered the headache. Triggers may include certain foods (chocolate, cheese, wine) or odors, smoke, bright light, stress, or lack of sleep. · If your doctor has prescribed medicine for your migraines, take it as directed. You may have medicine that you take only when you get a migraine and medicine that you take all the time to help prevent migraines. ? If your doctor has prescribed medicine for when you get a headache, take it at the first sign of a migraine, unless your doctor has given you other instructions. ? If your doctor has prescribed medicine to prevent migraines, take it exactly as prescribed. Call your doctor if you think you are having a problem with your medicine. · Find healthy ways to deal with stress. Migraines are most common during or right after stressful times. Try finding ways to reduce stress like practicing mindfulness or deep breathing exercises. · Get plenty of sleep and exercise. But be careful to not push yourself too hard during exercise. It may trigger a headache. · Eat meals on a regular schedule. Avoid foods and drinks that often trigger migraines.  These include chocolate, alcohol (especially red wine and port), aspartame, monosodium glutamate (MSG), and some additives found in foods (such as hot dogs, sloan, cold cuts, aged cheeses, and pickled foods). · Limit caffeine. Don't drink too much coffee, tea, or soda. But don't quit caffeine suddenly. That can also give you migraines. · Do not smoke or allow others to smoke around you. If you need help quitting, talk to your doctor about stop-smoking programs and medicines. These can increase your chances of quitting for good. · If you are taking birth control pills or hormone therapy, talk to your doctor about whether they are triggering your migraines. When should you call for help? [id number] anytime you think you may need emergency care. For example, call if:  · You have signs of a stroke. These may include:  ? Sudden numbness, paralysis, or weakness in your face, arm, or leg, especially on only one side of your body. ? Sudden vision changes. ? Sudden trouble speaking. ? Sudden confusion or trouble understanding simple statements. ? Sudden problems with walking or balance. ? A sudden, severe headache that is different from past headaches. Call your doctor now or seek immediate medical care if:  · You have new or worse nausea and vomiting. · You have a new or higher fever. · Your headache gets much worse. Watch closely for changes in your health, and be sure to contact your doctor if:  · You are not getting better after 2 days (48 hours). Where can you learn more? Go to http://raji-aurea.info/  Enter G390 in the search box to learn more about \"Migraine Headache: Care Instructions. \"  Current as of: November 20, 2019               Content Version: 12.5  © 4390-5109 Healthwise[employer]. Care instructions adapted under license by [employer] (which disclaims liability or warranty for this information).  If you have questions about a medical condition or this instruction, always ask your healthcare professional. [place] [address] any warranty or liability for your use of this information. Patient Education        Deciding About Taking Medicine to Prevent Migraines  How can you decide about taking medicine to prevent migraine headaches? What are migraines? Migraines are painful, throbbing headaches. They can last from 4 to 72 hours. They often occur on only one side of your head. But you may feel them on both sides. The pain may keep you from doing your daily activities. You may take a daily medicine if you get bad migraines often. This can help prevent them. What are key points about this decision? · Medicines to prevent migraines may not stop them every time. But if you take them daily, you can reduce how many migraines you get by more than half. They can also reduce how long migraines last. And your symptoms may not be as bad. · Medicines that prevent migraines may cause side effects. You may have sleep and memory problems, upset stomach, dry mouth, or constipation. Some of these side effects may last for as long as you take the medicine. Or they may go away within a few weeks. Why might you choose to take medicine to prevent migraines? · You are willing to take medicine daily if it will help your symptoms. · You don't think the side effects of the medicine could be as bad as your migraine symptoms. · Your migraines get in the way of your work. Or they harm your relationships with friends and family. · Benefits of medicine include fewer or no migraines. And your migraines may not last as long or feel as bad. Why might you choose not to take medicine to prevent migraines? · You want to avoid the side effects of the medicine. · You don't want to take medicine every day. · Your migraines are not affecting your work and relationships. · If your symptoms don't improve with home treatment and other medicines, you can decide later to take medicine every day to help prevent migraines.   Your decision  Thinking about the facts and your feelings can help you make a decision that is right for you. Be sure you understand the benefits and risks of your options, and think about what else you need to do before you make the decision. Where can you learn more? Go to http://raji-aurea.info/  Enter T957 in the search box to learn more about \"Deciding About Taking Medicine to Prevent Migraines. \"  Current as of: November 20, 2019               Content Version: 12.5  © 4883-3274 Healthwise, [employer]. Care instructions adapted under license by [employer] (which disclaims liability or warranty for this information). If you have questions about a medical condition or this instruction, always ask your healthcare professional. [address] any warranty or liability for your use of this information.

## 2020-06-07 NOTE — LETTER
NOTIFICATION RETURN TO WORK / SCHOOL 
 
6/7/2020 10:58 PM 
 
Ms. Aruna Sheppard [address] [place] [address] [place] [id number] To Whom It May Concern: 
 
Bart Machado is currently under the care of [facility] EMERGENCY DEPT. She will return to work/school on: Tuesday, 6/9 Aruna Sheppard may return to work/school with the following restrictions: None If there are questions or concerns please have the patient contact our office.  
 
 
 
Sincerely, 
 
 
Fred Stinson

## 2020-06-07 NOTE — ED PROVIDER NOTES
33-year-old female presents to the ER since status migrainosus. This is her sixth ER visit in 6 days with a trip to the infusion center either yesterday or the day before. The headache is been constant for these days she has been sleepy sometimes after leaving the ER. With a headache always seems to come back. It is retro-orbital in location dull to throbbing in nature. She has had nausea with 1 or 2 spells of vomiting per day for the last couple days at least.      High flow oxygen was employed last night for the first time in the first 5 visits. Last night's physician reportedly also performed a a cervical or occipital nerve block last night. Patient felt somewhat better when it was time for discharge, but this morning when she awoke she noted increased soreness to the occipital and cervical neck muscles down to the level of her trapezius. Patient states she also felt her right foot wanting to \"turn inwards\" as she walked. Reports numbness to both feet.            Past Medical History:   Diagnosis Date    Neurological disorder     Other ill-defined conditions(799.89)     narcalepsy    Other ill-defined conditions(799.89)     fibromyalgia    Other ill-defined conditions(799.89)     PCOS (polycystic ovarian syndrome)        Past Surgical History:   Procedure Laterality Date    HX CHOLECYSTECTOMY      HX WISDOM TEETH EXTRACTION           Family History:   Problem Relation Age of Onset    Diabetes Father     Hypertension Father     Breast Cancer Neg Hx     Ovarian Cancer Neg Hx     Colon Cancer Neg Hx        Social History     Socioeconomic History    Marital status: SINGLE     Spouse name: Not on file    Number of children: Not on file    Years of education: Not on file    Highest education level: Not on file   Occupational History    Not on file   Social Needs    Financial resource strain: Not on file    Food insecurity     Worry: Not on file     Inability: Not on file   In[employer] needs     Medical: Not on file     Non-medical: Not on file   Tobacco Use    Smoking status: Never Smoker    Smokeless tobacco: Never Used   Substance and Sexual Activity    Alcohol use: No    Drug use: No    Sexual activity: Not Currently   Lifestyle    Physical activity     Days per week: Not on file     Minutes per session: Not on file    Stress: Not on file   Relationships    Social connections     Talks on phone: Not on file     Gets together: Not on file     Attends Zoroastrian service: Not on file     Active member of club or organization: Not on file     Attends meetings of clubs or organizations: Not on file     Relationship status: Not on file    Intimate partner violence     Fear of current or ex partner: Not on file     Emotionally abused: Not on file     Physically abused: Not on file     Forced sexual activity: Not on file   Other Topics Concern    Not on file   Social History Narrative    Not on file         ALLERGIES: Latex, natural rubber; Amoxicillin; Doxycycline; Epinephrine; Gluten; Lyrica [pregabalin]; Neosporin [benzalkonium chloride]; Penicillins; Sulfa (sulfonamide antibiotics); and Zithromax [azithromycin]    Review of Systems   Constitutional: Negative for chills and fever. HENT: Positive for sore throat. Negative for rhinorrhea. Eyes: Positive for photophobia. Negative for discharge and redness. Respiratory: Negative for cough and shortness of breath. Cardiovascular: Negative for chest pain. Gastrointestinal: Positive for nausea and vomiting. Negative for abdominal pain and diarrhea. Musculoskeletal: Positive for neck pain. Negative for arthralgias, back pain and neck stiffness. Skin: Negative for rash. Neurological: Positive for headaches. Negative for dizziness. All other systems reviewed and are negative.       Vitals:    06/06/20 2219   BP: 130/81   Pulse: (!) 102   Resp: 18   Temp: 98.2 °F (36.8 °C)   SpO2: 96%   Weight: 99.8 kg (220 lb 0.3 oz)   Height: 5' 8\" (1.727 m)            Physical Exam  Vitals signs and nursing note reviewed. Constitutional:       General: She is in acute distress. Appearance: Normal appearance. She is well-developed. She is obese. She is not ill-appearing, toxic-appearing or diaphoretic. HENT:      Head: Normocephalic and atraumatic. Nose: Congestion present. Mouth/Throat:      Mouth: Mucous membranes are moist.      Pharynx: No oropharyngeal exudate or posterior oropharyngeal erythema. Eyes:      General: No scleral icterus. Right eye: No discharge. Left eye: No discharge. Extraocular Movements: Extraocular movements intact. Conjunctiva/sclera: Conjunctivae normal.      Pupils: Pupils are equal, round, and reactive to light. Neck:      Musculoskeletal: Normal range of motion and neck supple. Cardiovascular:      Rate and Rhythm: Normal rate and regular rhythm. Heart sounds: Normal heart sounds. No murmur. No gallop. Pulmonary:      Effort: Pulmonary effort is normal. No respiratory distress. Breath sounds: Normal breath sounds. No wheezing or rales. Abdominal:      General: Bowel sounds are normal.      Tenderness: There is no abdominal tenderness. There is no guarding. Musculoskeletal: Normal range of motion. Skin:     General: Skin is warm and dry. Neurological:      General: No focal deficit present. Mental Status: She is alert and oriented to person, place, and time. Mental status is at baseline. Motor: No abnormal muscle tone. Comments: cni 2-12 grossly   Psychiatric:         Mood and Affect: Mood normal.         Behavior: Behavior normal.          MDM  Number of Diagnoses or Management Options  Status migrainosus:   Diagnosis management comments: Medical decision making note:  Status migrainosus, now status post 6 consecutive days in the ER.   On at least 2 of those visits morphine was employed IM and IV, making rebound headache possible contributor to her recurrent headache and ER visits. Will administer fluids, Decadron, high flow oxygen, IV Depakote, Compazine with Benadryl, acute Imitrex. Will refrain from any opiate administration tonight  Labs have been normal, CT head was undertaken and unrevealing. She has no meningismus - I do not suspect meningitis - she is afebrile. 12:33 AM  All medicines are written except for the Depacon, only about 200 cc of saline have infused. Patient already experiencing improvement with headache having decreased from an 8 out of 10 to a 5 out of 10. She feels better. Urged to keep her arm straight to allow the IV fluids to infuse, the Depacon was finally found of the tube system and started just a little while ago. Anticipate discharge home with prescriptions, even if headache is not completely relieved. Avoiding opiates should hopefully reduce the risk of rebound tomorrow. Give 1 dose of trazodone orally at time of discharge to facilitate her sleep tonight. This concludes the \"medical decision making note\" part of this emergency department visit note.            Procedures

## 2020-06-08 ENCOUNTER — APPOINTMENT (OUTPATIENT)
Dept: CT IMAGING | Age: 30
DRG: 103 | End: 2020-06-08
Attending: EMERGENCY MEDICINE
Payer: SELF-PAY

## 2020-06-08 ENCOUNTER — HOSPITAL ENCOUNTER (INPATIENT)
Age: 30
LOS: 1 days | Discharge: HOME | DRG: 103 | End: 2020-06-11
Attending: EMERGENCY MEDICINE | Admitting: INTERNAL MEDICINE
Payer: SELF-PAY

## 2020-06-08 DIAGNOSIS — G43.901 STATUS MIGRAINOSUS: Primary | ICD-10-CM

## 2020-06-08 PROCEDURE — 74011250636 HC RX REV CODE- 250/636: Performed by: EMERGENCY MEDICINE

## 2020-06-08 PROCEDURE — 74011000258 HC RX REV CODE- 258: Performed by: EMERGENCY MEDICINE

## 2020-06-08 PROCEDURE — 74011250637 HC RX REV CODE- 250/637: Performed by: EMERGENCY MEDICINE

## 2020-06-08 PROCEDURE — 70496 CT ANGIOGRAPHY HEAD: CPT

## 2020-06-08 PROCEDURE — 99283 EMERGENCY DEPT VISIT LOW MDM: CPT

## 2020-06-08 PROCEDURE — 96365 THER/PROPH/DIAG IV INF INIT: CPT

## 2020-06-08 PROCEDURE — 96374 THER/PROPH/DIAG INJ IV PUSH: CPT

## 2020-06-08 PROCEDURE — 74011636320 HC RX REV CODE- 636/320: Performed by: EMERGENCY MEDICINE

## 2020-06-08 PROCEDURE — 96372 THER/PROPH/DIAG INJ SC/IM: CPT

## 2020-06-08 PROCEDURE — 99218 HC RM OBSERVATION: CPT

## 2020-06-08 PROCEDURE — 74011000250 HC RX REV CODE- 250: Performed by: EMERGENCY MEDICINE

## 2020-06-08 PROCEDURE — 74011636637 HC RX REV CODE- 636/637: Performed by: EMERGENCY MEDICINE

## 2020-06-08 PROCEDURE — 96375 TX/PRO/DX INJ NEW DRUG ADDON: CPT

## 2020-06-08 RX ORDER — DIPHENHYDRAMINE HYDROCHLORIDE 50 MG/ML
25 INJECTION, SOLUTION INTRAMUSCULAR; INTRAVENOUS
Status: COMPLETED | OUTPATIENT
Start: 2020-06-08 | End: 2020-06-08

## 2020-06-08 RX ORDER — VALPROATE SODIUM 100 MG/ML
1000 INJECTION INTRAVENOUS
Status: DISCONTINUED | OUTPATIENT
Start: 2020-06-08 | End: 2020-06-08 | Stop reason: SDUPTHER

## 2020-06-08 RX ORDER — SODIUM CHLORIDE 0.9 % (FLUSH) 0.9 %
10 SYRINGE (ML) INJECTION
Status: COMPLETED | OUTPATIENT
Start: 2020-06-08 | End: 2020-06-08

## 2020-06-08 RX ORDER — SUMATRIPTAN 6 MG/.5ML
6 INJECTION, SOLUTION SUBCUTANEOUS
Status: COMPLETED | OUTPATIENT
Start: 2020-06-08 | End: 2020-06-08

## 2020-06-08 RX ORDER — BUTALBITAL, ACETAMINOPHEN AND CAFFEINE 50; 325; 40 MG/1; MG/1; MG/1
2 TABLET ORAL
Status: COMPLETED | OUTPATIENT
Start: 2020-06-08 | End: 2020-06-08

## 2020-06-08 RX ORDER — METOCLOPRAMIDE HYDROCHLORIDE 5 MG/ML
10 INJECTION INTRAMUSCULAR; INTRAVENOUS
Status: COMPLETED | OUTPATIENT
Start: 2020-06-08 | End: 2020-06-08

## 2020-06-08 RX ADMIN — Medication 10 ML: at 21:35

## 2020-06-08 RX ADMIN — SODIUM CHLORIDE 100 ML: 900 INJECTION, SOLUTION INTRAVENOUS at 21:35

## 2020-06-08 RX ADMIN — IOPAMIDOL 100 ML: 755 INJECTION, SOLUTION INTRAVENOUS at 21:34

## 2020-06-08 RX ADMIN — METOCLOPRAMIDE HYDROCHLORIDE 10 MG: 5 INJECTION INTRAMUSCULAR; INTRAVENOUS at 20:48

## 2020-06-08 RX ADMIN — SODIUM CHLORIDE 1000 ML: 9 INJECTION, SOLUTION INTRAVENOUS at 20:47

## 2020-06-08 RX ADMIN — VALPROATE SODIUM 1000 MG: 100 INJECTION, SOLUTION INTRAVENOUS at 22:35

## 2020-06-08 RX ADMIN — SUMATRIPTAN SUCCINATE 6 MG: 6 INJECTION SUBCUTANEOUS at 20:48

## 2020-06-08 RX ADMIN — DIPHENHYDRAMINE HYDROCHLORIDE 25 MG: 50 INJECTION, SOLUTION INTRAMUSCULAR; INTRAVENOUS at 20:48

## 2020-06-08 RX ADMIN — BUTALBITAL, ACETAMINOPHEN, AND CAFFEINE 2 TABLET: 50; 325; 40 TABLET ORAL at 20:48

## 2020-06-08 NOTE — DISCHARGE INSTRUCTIONS

## 2020-06-08 NOTE — ED NOTES
I have reviewed discharge instructions with the patient. The patient verbalized understanding. Patient left ED via Discharge Method: ambulatory to Home with mom. Opportunity for questions and clarification provided. Patient given 0 scripts. To continue your aftercare when you leave the hospital, you may receive an automated call from our care team to check in on how you are doing. This is a free service and part of our promise to provide the best care and service to meet your aftercare needs.  If you have questions, or wish to unsubscribe from this service please call [phone]. Thank you for Choosing our [facility] Emergency Department.

## 2020-06-08 NOTE — ED PROVIDER NOTES
75-year-old white female with history of migraine headaches presents to the emergency department for the seventh visit in a row. Pain at the infusion center and was treated with steroids, Toradol, magnesium and Phenergan. She had temporary improvement however the headache returned again today. Previous lab work and head CT were unremarkable. Patient reports no change in her headache location or quality. It is throbbing bilateral retro-orbital and radiates toward the occipital area. No vomiting since previous visit. No fever. On visit 3 days ago she was also found to have a urinary tract infection. She is still on antibiotics for that. The history is provided by the patient and a parent. Headache    Associated symptoms include nausea. Pertinent negatives include no fever, no shortness of breath and no vomiting.         Past Medical History:   Diagnosis Date    Neurological disorder     Other ill-defined conditions(799.89)     narcalepsy    Other ill-defined conditions(799.89)     fibromyalgia    Other ill-defined conditions(799.89)     PCOS (polycystic ovarian syndrome)        Past Surgical History:   Procedure Laterality Date    HX CHOLECYSTECTOMY      HX WISDOM TEETH EXTRACTION           Family History:   Problem Relation Age of Onset    Diabetes Father     Hypertension Father     Breast Cancer Neg Hx     Ovarian Cancer Neg Hx     Colon Cancer Neg Hx        Social History     Socioeconomic History    Marital status: SINGLE     Spouse name: Not on file    Number of children: Not on file    Years of education: Not on file    Highest education level: Not on file   Occupational History    Not on file   Social Needs    Financial resource strain: Not on file    Food insecurity     Worry: Not on file     Inability: Not on file    Transportation needs     Medical: Not on file     Non-medical: Not on file   Tobacco Use    Smoking status: Never Smoker    Smokeless tobacco: Never Used Substance and Sexual Activity    Alcohol use: No    Drug use: No    Sexual activity: Not Currently   Lifestyle    Physical activity     Days per week: Not on file     Minutes per session: Not on file    Stress: Not on file   Relationships    Social connections     Talks on phone: Not on file     Gets together: Not on file     Attends Sabianist service: Not on file     Active member of club or organization: Not on file     Attends meetings of clubs or organizations: Not on file     Relationship status: Not on file    Intimate partner violence     Fear of current or ex partner: Not on file     Emotionally abused: Not on file     Physically abused: Not on file     Forced sexual activity: Not on file   Other Topics Concern    Not on file   Social History Narrative    Not on file         ALLERGIES: Latex, natural rubber; Amoxicillin; Doxycycline; Epinephrine; Gluten; Lyrica [pregabalin]; Neosporin [benzalkonium chloride]; Penicillins; Sulfa (sulfonamide antibiotics); and Zithromax [azithromycin]    Review of Systems   Constitutional: Negative for fever. HENT: Negative for congestion. Respiratory: Negative for shortness of breath. Cardiovascular: Negative for chest pain. Gastrointestinal: Positive for nausea. Negative for abdominal pain and vomiting. Genitourinary: Negative for dysuria. Musculoskeletal: Negative for back pain and neck pain. Skin: Negative for rash. Neurological: Positive for headaches. Vitals:    06/07/20 1939   BP: 102/68   Pulse: (!) 102   Resp: 20   Temp: 97.8 °F (36.6 °C)   SpO2: 99%   Weight: 99.8 kg (220 lb)   Height: 5' 8\" (1.727 m)            Physical Exam  Vitals signs and nursing note reviewed. Constitutional:       General: She is not in acute distress. Appearance: Normal appearance. She is not toxic-appearing. HENT:      Head: Normocephalic and atraumatic.       Nose: Nose normal.      Mouth/Throat:      Mouth: Mucous membranes are moist.      Pharynx: Oropharynx is clear. Eyes:      Extraocular Movements: Extraocular movements intact. Conjunctiva/sclera: Conjunctivae normal.      Pupils: Pupils are equal, round, and reactive to light. Neck:      Musculoskeletal: Normal range of motion and neck supple. Cardiovascular:      Rate and Rhythm: Normal rate and regular rhythm. Heart sounds: No murmur. Pulmonary:      Effort: Pulmonary effort is normal.      Breath sounds: Normal breath sounds. Abdominal:      General: There is no distension. Palpations: Abdomen is soft. Tenderness: There is no abdominal tenderness. Musculoskeletal: Normal range of motion. General: No swelling. Skin:     General: Skin is warm. Neurological:      General: No focal deficit present. Mental Status: She is alert and oriented to person, place, and time. Cranial Nerves: No cranial nerve deficit. Sensory: No sensory deficit. Coordination: Coordination normal.      Deep Tendon Reflexes: Reflexes normal.   Psychiatric:         Mood and Affect: Mood normal.         Behavior: Behavior normal.          MDM  Number of Diagnoses or Management Options  Diagnosis management comments: IV access again obtained. Patient treated with IV fluids, Haldol and Benadryl. EKG was obtained prior to receiving Haldol and patient noted to have normal QT interval.  Patient has had multiple blood tests performed on previous visits therefore blood work was not repeated. Due to persistence of headache without explanation I did recommend spinal tap to assess for infection and increased pressure. Risks and benefits were explained and patient provided consent for the procedure. This was performed and patient was found to have opening pressure of 17 which is normal.  CSF shows no white cells or red cells. Normal protein and glucose. Patient is again feeling better after haloperidol and Benadryl.   I did offer admission however she and mother would prefer to go home at this time and follow-up with Dr. Gricelda Bowman tomorrow morning. Amount and/or Complexity of Data Reviewed  Clinical lab tests: ordered and reviewed  Tests in the medicine section of CPT®: ordered and reviewed    Risk of Complications, Morbidity, and/or Mortality  Presenting problems: moderate  Diagnostic procedures: moderate  Management options: moderate           Lumbar Puncture  Date/Time: 6/7/2020 9:25 PM  Performed by: Gene Tracy MD  Authorized by: Gene Tracy MD     Consent:     Consent obtained:  Written    Consent given by:  Patient    Risks discussed:  Bleeding, infection, nerve damage, headache and pain    Alternatives discussed:  No treatment  Pre-procedure details:     Procedure purpose:  Diagnostic    Preparation: Patient was prepped and draped in usual sterile fashion    Anesthesia (see MAR for exact dosages): Anesthesia method:  Local infiltration    Local anesthetic:  Lidocaine 1% w/o epi  Procedure details:     Lumbar space:  L4-L5 interspace    Patient position:  L lateral decubitus    Needle gauge:  20    Needle type:  Spinal needle - Quincke tip    Needle length (in):  3.5    Ultrasound guidance: no      Number of attempts:  1    Opening pressure (cm H2O):  17    Closing pressure (cm H2O):  15    Fluid appearance:  Clear    Tubes of fluid:  4    Total volume (ml):  5  Post-procedure:     Puncture site:  Adhesive bandage applied and direct pressure applied    Patient tolerance of procedure:   Tolerated well, no immediate complications

## 2020-06-08 NOTE — ED TRIAGE NOTES
Patient arrives to ED from home. Patient states she has had a headache x 10 days now. Patient has been to ED and infusion center several times. Patient states she has chronic migraines. Patient states she is sensitive to light. Patient states she is nauseas. Patient states she took compazine and Fioricet at home with no relief.

## 2020-06-09 ENCOUNTER — APPOINTMENT (OUTPATIENT)
Dept: MRI IMAGING | Age: 30
DRG: 103 | End: 2020-06-09
Attending: FAMILY MEDICINE
Payer: SELF-PAY

## 2020-06-09 PROBLEM — F31.4 BIPOLAR I DISORDER, MOST RECENT EPISODE DEPRESSED, SEVERE WITHOUT PSYCHOTIC FEATURES (HCC): Status: ACTIVE | Noted: 2020-03-13

## 2020-06-09 LAB
ANION GAP SERPL CALC-SCNC: 4 MMOL/L (ref 7–16)
ATRIAL RATE: 84 BPM
BASOPHILS # BLD: 0 K/UL (ref 0–0.2)
BASOPHILS NFR BLD: 1 % (ref 0–2)
BUN SERPL-MCNC: 18 MG/DL (ref 6–23)
CALCIUM SERPL-MCNC: 8 MG/DL (ref 8.3–10.4)
CALCULATED P AXIS, ECG09: 47 DEGREES
CALCULATED R AXIS, ECG10: 36 DEGREES
CALCULATED T AXIS, ECG11: 42 DEGREES
CHLORIDE SERPL-SCNC: 106 MMOL/L (ref 98–107)
CO2 SERPL-SCNC: 31 MMOL/L (ref 21–32)
CREAT SERPL-MCNC: 0.69 MG/DL (ref 0.6–1)
DIAGNOSIS, 93000: NORMAL
DIFFERENTIAL METHOD BLD: ABNORMAL
EOSINOPHIL # BLD: 0.1 K/UL (ref 0–0.8)
EOSINOPHIL NFR BLD: 1 % (ref 0.5–7.8)
ERYTHROCYTE [DISTWIDTH] IN BLOOD BY AUTOMATED COUNT: 12.3 % (ref 11.9–14.6)
GLUCOSE SERPL-MCNC: 72 MG/DL (ref 65–100)
HCT VFR BLD AUTO: 35.9 % (ref 35.8–46.3)
HGB BLD-MCNC: 11.7 G/DL (ref 11.7–15.4)
IMM GRANULOCYTES # BLD AUTO: 0 K/UL (ref 0–0.5)
IMM GRANULOCYTES NFR BLD AUTO: 1 % (ref 0–5)
LYMPHOCYTES # BLD: 2.9 K/UL (ref 0.5–4.6)
LYMPHOCYTES NFR BLD: 44 % (ref 13–44)
MCH RBC QN AUTO: 30.2 PG (ref 26.1–32.9)
MCHC RBC AUTO-ENTMCNC: 32.6 G/DL (ref 31.4–35)
MCV RBC AUTO: 92.8 FL (ref 79.6–97.8)
MONOCYTES # BLD: 0.4 K/UL (ref 0.1–1.3)
MONOCYTES NFR BLD: 7 % (ref 4–12)
NEUTS SEG # BLD: 3 K/UL (ref 1.7–8.2)
NEUTS SEG NFR BLD: 47 % (ref 43–78)
NRBC # BLD: 0 K/UL (ref 0–0.2)
P-R INTERVAL, ECG05: 146 MS
PLATELET # BLD AUTO: 157 K/UL (ref 150–450)
PMV BLD AUTO: 10.7 FL (ref 9.4–12.3)
POTASSIUM SERPL-SCNC: 3.8 MMOL/L (ref 3.5–5.1)
Q-T INTERVAL, ECG07: 354 MS
QRS DURATION, ECG06: 86 MS
QTC CALCULATION (BEZET), ECG08: 418 MS
RBC # BLD AUTO: 3.87 M/UL (ref 4.05–5.2)
SODIUM SERPL-SCNC: 141 MMOL/L (ref 136–145)
VENTRICULAR RATE, ECG03: 84 BPM
WBC # BLD AUTO: 6.4 K/UL (ref 4.3–11.1)

## 2020-06-09 PROCEDURE — 74011250637 HC RX REV CODE- 250/637: Performed by: FAMILY MEDICINE

## 2020-06-09 PROCEDURE — 70551 MRI BRAIN STEM W/O DYE: CPT

## 2020-06-09 PROCEDURE — 74011250636 HC RX REV CODE- 250/636: Performed by: PSYCHIATRY & NEUROLOGY

## 2020-06-09 PROCEDURE — 96376 TX/PRO/DX INJ SAME DRUG ADON: CPT

## 2020-06-09 PROCEDURE — 74011250636 HC RX REV CODE- 250/636: Performed by: FAMILY MEDICINE

## 2020-06-09 PROCEDURE — 96375 TX/PRO/DX INJ NEW DRUG ADDON: CPT

## 2020-06-09 PROCEDURE — 85025 COMPLETE CBC W/AUTO DIFF WBC: CPT

## 2020-06-09 PROCEDURE — 99218 HC RM OBSERVATION: CPT

## 2020-06-09 PROCEDURE — 80048 BASIC METABOLIC PNL TOTAL CA: CPT

## 2020-06-09 PROCEDURE — 74011000250 HC RX REV CODE- 250: Performed by: PSYCHIATRY & NEUROLOGY

## 2020-06-09 PROCEDURE — 99215 OFFICE O/P EST HI 40 MIN: CPT | Performed by: PSYCHIATRY & NEUROLOGY

## 2020-06-09 PROCEDURE — 96372 THER/PROPH/DIAG INJ SC/IM: CPT

## 2020-06-09 RX ORDER — BUTALBITAL, ACETAMINOPHEN AND CAFFEINE 50; 325; 40 MG/1; MG/1; MG/1
1 TABLET ORAL 3 TIMES DAILY
Status: DISCONTINUED | OUTPATIENT
Start: 2020-06-09 | End: 2020-06-09

## 2020-06-09 RX ORDER — LORATADINE 10 MG/1
10 TABLET ORAL DAILY
Status: DISCONTINUED | OUTPATIENT
Start: 2020-06-09 | End: 2020-06-09

## 2020-06-09 RX ORDER — CYCLOBENZAPRINE HCL 10 MG
10 TABLET ORAL
Status: DISCONTINUED | OUTPATIENT
Start: 2020-06-09 | End: 2020-06-11 | Stop reason: HOSPADM

## 2020-06-09 RX ORDER — ENOXAPARIN SODIUM 100 MG/ML
40 INJECTION SUBCUTANEOUS EVERY 24 HOURS
Status: DISCONTINUED | OUTPATIENT
Start: 2020-06-09 | End: 2020-06-11 | Stop reason: HOSPADM

## 2020-06-09 RX ORDER — TRAZODONE HYDROCHLORIDE 50 MG/1
150 TABLET ORAL
Status: DISCONTINUED | OUTPATIENT
Start: 2020-06-09 | End: 2020-06-11 | Stop reason: HOSPADM

## 2020-06-09 RX ORDER — PROCHLORPERAZINE MALEATE 10 MG
10 TABLET ORAL
Status: DISCONTINUED | OUTPATIENT
Start: 2020-06-09 | End: 2020-06-09

## 2020-06-09 RX ORDER — TOPIRAMATE 100 MG/1
100 TABLET, FILM COATED ORAL 2 TIMES DAILY
Status: DISCONTINUED | OUTPATIENT
Start: 2020-06-09 | End: 2020-06-11 | Stop reason: HOSPADM

## 2020-06-09 RX ORDER — ACETAMINOPHEN 325 MG/1
650 TABLET ORAL
Status: DISCONTINUED | OUTPATIENT
Start: 2020-06-09 | End: 2020-06-11 | Stop reason: HOSPADM

## 2020-06-09 RX ORDER — CITALOPRAM 20 MG/1
40 TABLET, FILM COATED ORAL DAILY
Status: DISCONTINUED | OUTPATIENT
Start: 2020-06-09 | End: 2020-06-11 | Stop reason: HOSPADM

## 2020-06-09 RX ORDER — ONDANSETRON 2 MG/ML
4 INJECTION INTRAMUSCULAR; INTRAVENOUS
Status: DISCONTINUED | OUTPATIENT
Start: 2020-06-09 | End: 2020-06-09

## 2020-06-09 RX ORDER — BUTALBITAL, ACETAMINOPHEN AND CAFFEINE 50; 325; 40 MG/1; MG/1; MG/1
1 TABLET ORAL DAILY
Status: DISCONTINUED | OUTPATIENT
Start: 2020-06-10 | End: 2020-06-09

## 2020-06-09 RX ORDER — METOCLOPRAMIDE 10 MG/1
10 TABLET ORAL
Status: DISCONTINUED | OUTPATIENT
Start: 2020-06-09 | End: 2020-06-09

## 2020-06-09 RX ORDER — SODIUM CHLORIDE 0.9 % (FLUSH) 0.9 %
5-40 SYRINGE (ML) INJECTION AS NEEDED
Status: DISCONTINUED | OUTPATIENT
Start: 2020-06-09 | End: 2020-06-11 | Stop reason: HOSPADM

## 2020-06-09 RX ORDER — KETOROLAC TROMETHAMINE 30 MG/ML
30 INJECTION, SOLUTION INTRAMUSCULAR; INTRAVENOUS EVERY 8 HOURS
Status: DISCONTINUED | OUTPATIENT
Start: 2020-06-09 | End: 2020-06-11 | Stop reason: HOSPADM

## 2020-06-09 RX ORDER — SUMATRIPTAN 50 MG/1
100 TABLET, FILM COATED ORAL
Status: DISCONTINUED | OUTPATIENT
Start: 2020-06-09 | End: 2020-06-09

## 2020-06-09 RX ORDER — SODIUM CHLORIDE 0.9 % (FLUSH) 0.9 %
5-40 SYRINGE (ML) INJECTION EVERY 8 HOURS
Status: DISCONTINUED | OUTPATIENT
Start: 2020-06-09 | End: 2020-06-11 | Stop reason: HOSPADM

## 2020-06-09 RX ORDER — DIPHENHYDRAMINE HYDROCHLORIDE 50 MG/ML
50 INJECTION, SOLUTION INTRAMUSCULAR; INTRAVENOUS EVERY 6 HOURS
Status: DISCONTINUED | OUTPATIENT
Start: 2020-06-09 | End: 2020-06-11 | Stop reason: HOSPADM

## 2020-06-09 RX ORDER — DIPHENHYDRAMINE HCL 25 MG
25 CAPSULE ORAL
Status: DISCONTINUED | OUTPATIENT
Start: 2020-06-09 | End: 2020-06-09

## 2020-06-09 RX ADMIN — KETOROLAC TROMETHAMINE 30 MG: 30 INJECTION, SOLUTION INTRAMUSCULAR at 22:01

## 2020-06-09 RX ADMIN — PROCHLORPERAZINE EDISYLATE 10 MG: 5 INJECTION INTRAMUSCULAR; INTRAVENOUS at 17:38

## 2020-06-09 RX ADMIN — DIPHENHYDRAMINE HYDROCHLORIDE 50 MG: 50 INJECTION, SOLUTION INTRAMUSCULAR; INTRAVENOUS at 17:39

## 2020-06-09 RX ADMIN — Medication 5 ML: at 16:11

## 2020-06-09 RX ADMIN — Medication 5 ML: at 06:13

## 2020-06-09 RX ADMIN — KETOROLAC TROMETHAMINE 30 MG: 30 INJECTION, SOLUTION INTRAMUSCULAR at 13:32

## 2020-06-09 RX ADMIN — Medication 10 ML: at 22:00

## 2020-06-09 RX ADMIN — ENOXAPARIN SODIUM 40 MG: 40 INJECTION SUBCUTANEOUS at 08:29

## 2020-06-09 RX ADMIN — SUMATRIPTAN SUCCINATE 100 MG: 50 TABLET ORAL at 05:14

## 2020-06-09 RX ADMIN — BUTALBITAL, ACETAMINOPHEN, AND CAFFEINE 1 TABLET: 50; 325; 40 TABLET ORAL at 08:29

## 2020-06-09 RX ADMIN — CITALOPRAM HYDROBROMIDE 40 MG: 20 TABLET ORAL at 08:35

## 2020-06-09 RX ADMIN — DIVALPROEX SODIUM 750 MG: 500 TABLET, DELAYED RELEASE ORAL at 17:40

## 2020-06-09 RX ADMIN — DIVALPROEX SODIUM 750 MG: 500 TABLET, DELAYED RELEASE ORAL at 08:34

## 2020-06-09 RX ADMIN — PROCHLORPERAZINE MALEATE 10 MG: 10 TABLET ORAL at 05:13

## 2020-06-09 NOTE — PROGRESS NOTES
TRANSFER - IN REPORT:    Verbal report received from [facility] RN(name) on Jennie Paulino  being received from ED(unit) for routine progression of care      Report consisted of patients Situation, Background, Assessment and   Recommendations(SBAR). Information from the following report(s) SBAR, Kardex and MAR was reviewed with the receiving nurse. Opportunity for questions and clarification was provided. Assessment completed upon patients arrival to unit and care assumed.

## 2020-06-09 NOTE — H&P
HOSPITALIST H&P/CONSULT  NAME:  Josemanuel Crowley   Age:  26 y.o.  :   1990   MRN:   [id number]  PCP: Harley Nichols MD  Consulting MD:  Treatment Team: Attending Provider: Martinez Jamil MD; Primary Nurse: Jonathan Wilde RN  HPI:   Patient is a 26yoF with PMH significant for migraines and bipolar disorder who presents with status migrainosus. Patient's migraine started last Saturday; she has been having constant migraine for 10 days. She has been to the ER every day for 8 days. She has underwent LP, CT, and multiple lab draws. She has gotten multiple medication \"cocktails\" to help with migraine with minimal improvement. She completed a virtual visit with her neurologist who ordered infusion at [facility], but this also did not help. She reports the pain as 9/10, and the best improvement that has occurred over the last week as been to a 3/10 pain level. She has been able to get some rest at night with her home medications. Patient is severely photophobic, with associated nausea and vomiting. Reports that her head hurts most behind her eyes; it is described as sharp. Tele-neuro was consulted. Hospitalist to admit. Complete ROS done and is as stated in HPI or otherwise negative. Past Medical History:   Diagnosis Date    Neurological disorder     Other ill-defined conditions(799.89)     narcalepsy    Other ill-defined conditions(799.89)     fibromyalgia    Other ill-defined conditions(799.89)     PCOS (polycystic ovarian syndrome)     Past Medical History reviewed. Past Surgical History:   Procedure Laterality Date    HX CHOLECYSTECTOMY      HX WISDOM TEETH EXTRACTION        Prior to Admission Medications   Prescriptions Last Dose Informant Patient Reported? Taking? EPINEPHrine (EPIPEN) 0.3 mg/0.3 mL injection   Yes No   Si.3 mg by IntraMUSCular route once as needed.      OXcarbazepine (TRILEPTAL) 150 mg tablet   No No   Sig: Take 3 bid   SUMAtriptan (Imitrex) 100 mg tablet No No   Sig: Take 1 Tab by mouth two (2) times daily as needed for Migraine. SUMAtriptan (Imitrex) 25 mg tablet   No No   Sig: Take 1-2 Tabs by mouth once as needed for Migraine for up to 1 dose. Max 100mg in 24 hours, may repeat once, in 2 hours. butalbital-acetaminophen-caff (FIORICET) -40 mg per capsule   No No   Sig: Take 1 Cap by mouth three (3) times daily. butalbital-acetaminophen-caff (Fioricet) -40 mg per capsule   No No   Sig: Take 1-2 Caps by mouth every six (6) hours as needed for Headache.   citalopram (CELEXA) 40 mg tablet   Yes No   Sig: Take 40 mg by mouth daily. cyclobenzaprine (FLEXERIL) 10 mg tablet   Yes No   Sig: Take  by mouth three (3) times daily as needed for Muscle Spasm(s). diphenhydrAMINE (BENADRYL ALLERGY) 25 mg tablet   No No   Sig: Take 1 Tab by mouth every six (6) hours as needed for Sleep. doxycycline (VIBRA-TABS) 100 mg tablet   No No   Sig: Take 1 Tab by mouth two (2) times a day for 7 days. loratadine (CLARITIN) 10 mg tablet   Yes No   Sig: Take 10 mg by mouth daily. magnesium oxide 500 mg tab   Yes No   Sig: Take  by mouth.   metoclopramide HCl (Reglan) 10 mg tablet   No No   Sig: Take 1 Tab by mouth every six (6) hours as needed for Nausea for up to 20 doses. naproxen (NAPROSYN) 500 mg tablet   Yes No   Sig: Take 500 mg by mouth two (2) times daily (with meals). ondansetron hcl (ZOFRAN, AS HYDROCHLORIDE,) 8 mg tablet   Yes No   Sig: Take 8 mg by mouth every eight (8) hours as needed for Nausea. predniSONE (STERAPRED DS) 10 mg dose pack   No No   Sig: Please take as directed on package. Please clarify any questions with the Pharmacist.   prochlorperazine (COMPAZINE) 10 mg tablet   No No   Sig: Take 1 Tab by mouth every six (6) hours as needed for Other (as needed for nausea/vomiting/migraine). topiramate (TOPAMAX) 100 mg tablet   Yes No   Sig: Take  by mouth two (2) times a day.    topiramate (TOPAMAX) 100 mg tablet   No No   Sig: Take 1.5 tabs bid   topiramate (TOPAMAX) 50 mg tablet   Yes No   Sig: Take  by mouth two (2) times a day. traZODone (DESYREL) 150 mg tablet   No No   Sig: Take 1 Tab by mouth nightly as needed for Sleep. Facility-Administered Medications: None     Allergies   Allergen Reactions    Latex, Natural Rubber Contact Dermatitis    Amoxicillin Rash    Doxycycline Other (comments)     \"hyper\"    Epinephrine Nausea and Vomiting     PLEASE REMOVE FROM ALLERGY LIST    Gluten Nausea Only    Lyrica [Pregabalin] Unknown (comments)     hallucinations    Neosporin [Benzalkonium Chloride] Hives    Penicillins Rash    Sulfa (Sulfonamide Antibiotics) Rash    Zithromax [Azithromycin] Hives      Social History     Tobacco Use    Smoking status: Never Smoker    Smokeless tobacco: Never Used   Substance Use Topics    Alcohol use: No      Family History   Problem Relation Age of Onset    Diabetes Father     Hypertension Father     Breast Cancer Neg Hx     Ovarian Cancer Neg Hx     Colon Cancer Neg Hx     Family History reviewed and is non-contributory to current presentation. Objective:     Visit Vitals  /52 (BP 1 Location: Right arm, BP Patient Position: At rest)   Pulse 74   Temp 98.2 °F (36.8 °C)   Resp 16   Ht 5' 8\" (1.727 m)   Wt 99.8 kg (220 lb)   SpO2 99%   BMI 33.45 kg/m²      Temp (24hrs), Av.3 °F (36.8 °C), Min:98.2 °F (36.8 °C), Max:98.3 °F (36.8 °C)    Oxygen Therapy  O2 Sat (%): 99 % (20)  O2 Device: Room air (20)  Physical Exam:  General:    Alert, cooperative, no distress, appears stated age, appears uncomfortable. Head:   Normocephalic, without obvious abnormality, atraumatic. Nose:  Nares normal. No drainage or sinus tenderness. Lungs:   Clear to auscultation bilaterally. No Wheezing or Rhonchi. No rales. Heart:   Regular rate and rhythm, no murmur, rub or gallop. Abdomen:   Soft, non-tender. Not distended. Bowel sounds normal.   Extremities: No cyanosis. No edema. No clubbing. Skin:     Texture, turgor normal.  Not Jaundiced. Neurologic: Alert and oriented x 3, no focal deficits. Severe retro-orbital headache. Photophobic. Data Review:   No results found for this or any previous visit (from the past 24 hour(s)). Imaging /Procedures /Studies     Assessment and Plan:      Active Hospital Problems    Diagnosis Date Noted    Status migrainosus 06/08/2020    Bipolar I disorder, most recent episode depressed, severe without psychotic features ([facility] [address] 03/13/2020       PLAN  Status Migrainosus  - Has undergone many, many treatments over the past week without significant improvement  - LP, CT head normal  - Labs okay  - Will check MRI brain  - Consult in in-house Neurology in AM for assistance  - Patient is already on Depakote for bipolar disorder  - Was taken off of Trileptal and Topamax during an inpatient psychiatric stay earlier this year  - Continuing other home meds, although they have not recently been working    Bipolar Disorder  - Appropriate mood and affect  - As above, during a psychiatric stay, she was taken off of topamax and trileptal  - Currently on Depakote 750mg BID for bipolar disorder    Anticipated discharge: 1-2 days, pending clinical course    Signed By: Tyrone Cavazos MD     June 9, 2020

## 2020-06-09 NOTE — PROGRESS NOTES
Hospitalist Progress Note    Subjective:   Daily Progress Note: 6/9/2020 12:38 PM    Patient presented to ER yesterday pm with complaints of 9/10 retro orbital headache x 10 days, describing pain as sharp behind eyes. Has been to ER daily x 8 and infusion center, virtual visit with Neurologist,  multiple times since onset. CT brain, LP, labs all unremarkable. Mild to moderate relief with migraine cocktails. Photophobia and phonophobia with nausea. Took fioricet and compazine without relief. teleneuro consulted. Reported some right side weakness. 6/9:  Dr Judge Parker in, recommendations as below. Continued headache with photophobia, phonophobia and nausea. Mother at bedside. Patient reports the longest any past migraine has lasted was 2 days. Waiting for Dr Judge Parker to place block. ADDITIONAL HISTORY:  Narcolepsy, fibromyalgia, PCOS, obesity, bipolar disorder recently taken off trileptal and topamax. Current Facility-Administered Medications   Medication Dose Route Frequency    citalopram (CELEXA) tablet 40 mg  40 mg Oral DAILY    cyclobenzaprine (FLEXERIL) tablet 10 mg  10 mg Oral TID PRN    [Held by provider] topiramate (TOPAMAX) tablet 100 mg  100 mg Oral BID    traZODone (DESYREL) tablet 150 mg  150 mg Oral QHS PRN    sodium chloride (NS) flush 5-40 mL  5-40 mL IntraVENous Q8H    sodium chloride (NS) flush 5-40 mL  5-40 mL IntraVENous PRN    acetaminophen (TYLENOL) tablet 650 mg  650 mg Oral Q4H PRN    enoxaparin (LOVENOX) injection 40 mg  40 mg SubCUTAneous Q24H    divalproex DR (DEPAKOTE) tablet 750 mg  750 mg Oral BID    ketorolac (TORADOL) injection 30 mg  30 mg IntraMUSCular Q8H    diphenhydrAMINE (BENADRYL) injection 50 mg  50 mg IntraVENous Q6H    prochlorperazine (COMPAZINE) with saline injection 10 mg  10 mg IntraVENous Q6H     Current Outpatient Medications   Medication Sig    traZODone (DESYREL) 150 mg tablet Take 1 Tab by mouth nightly as needed for Sleep.     prochlorperazine (COMPAZINE) 10 mg tablet Take 1 Tab by mouth every six (6) hours as needed for Other (as needed for nausea/vomiting/migraine).  butalbital-acetaminophen-caff (Fioricet) -40 mg per capsule Take 1-2 Caps by mouth every six (6) hours as needed for Headache.  doxycycline (VIBRA-TABS) 100 mg tablet Take 1 Tab by mouth two (2) times a day for 7 days.  SUMAtriptan (Imitrex) 100 mg tablet Take 1 Tab by mouth two (2) times daily as needed for Migraine.  butalbital-acetaminophen-caff (FIORICET) -40 mg per capsule Take 1 Cap by mouth three (3) times daily.  OXcarbazepine (TRILEPTAL) 150 mg tablet Take 3 bid    topiramate (TOPAMAX) 100 mg tablet Take  by mouth two (2) times a day.  topiramate (TOPAMAX) 50 mg tablet Take  by mouth two (2) times a day.  citalopram (CELEXA) 40 mg tablet Take 40 mg by mouth daily.  ondansetron hcl (ZOFRAN, AS HYDROCHLORIDE,) 8 mg tablet Take 8 mg by mouth every eight (8) hours as needed for Nausea.  cyclobenzaprine (FLEXERIL) 10 mg tablet Take  by mouth three (3) times daily as needed for Muscle Spasm(s).  naproxen (NAPROSYN) 500 mg tablet Take 500 mg by mouth two (2) times daily (with meals).  magnesium oxide 500 mg tab Take  by mouth.  topiramate (TOPAMAX) 100 mg tablet Take 1.5 tabs bid    predniSONE (STERAPRED DS) 10 mg dose pack Please take as directed on package. Please clarify any questions with the Pharmacist.    diphenhydrAMINE (BENADRYL ALLERGY) 25 mg tablet Take 1 Tab by mouth every six (6) hours as needed for Sleep.  EPINEPHrine (EPIPEN) 0.3 mg/0.3 mL injection 0.3 mg by IntraMUSCular route once as needed.  loratadine (CLARITIN) 10 mg tablet Take 10 mg by mouth daily. Review of Systems  A comprehensive review of systems was negative except for that written in the HPI.     Objective:     Visit Vitals  BP 99/64   Pulse 72   Temp 97.9 °F (36.6 °C)   Resp 16   Ht 5' 8\" (1.727 m)   Wt 99.8 kg (220 lb)   SpO2 100%   BMI 33.45 kg/m²      O2 Device: Room air    Temp (24hrs), Av.3 °F (36.8 °C), Min:97.9 °F (36.6 °C), Max:98.6 °F (37 °C)     190 -  0700  In: 1150 [I.V.:1150]  Out: -     General appearance: Awake and alert, cooperative, persistent headache with photo and phonophobia and nausea. Head: Normocephalic, without obvious abnormality, atraumatic  Eyes: conjunctivae/corneas clear. Extremely photophobic  Throat: Lips, mucosa, and tongue normal. Teeth and gums normal  Neck: supple, symmetrical, trachea midline,and no JVD  Lungs: clear to auscultation bilaterally  Heart: regular rate and rhythm, S1, S2 normal, no murmur, click, rub or gallop  Abdomen: soft, non-tender. Bowel sounds normal. No masses,  no organomegaly  Extremities: extremities normal, atraumatic, no cyanosis or edema  Skin: Skin color, texture, turgor normal. No rashes or lesions  Neurologic: Grossly normal, no overt deficit.       Additional comments: Notes,orders, test results, vitals reviewed    Data Review  Recent Results (from the past 24 hour(s))   METABOLIC PANEL, BASIC    Collection Time: 20  3:59 AM   Result Value Ref Range    Sodium 141 136 - 145 mmol/L    Potassium 3.8 3.5 - 5.1 mmol/L    Chloride 106 98 - 107 mmol/L    CO2 31 21 - 32 mmol/L    Anion gap 4 (L) 7 - 16 mmol/L    Glucose 72 65 - 100 mg/dL    BUN 18 6 - 23 MG/DL    Creatinine 0.69 0.6 - 1.0 MG/DL    GFR est AA >60 >60 ml/min/1.73m2    GFR est non-AA >60 >60 ml/min/1.73m2    Calcium 8.0 (L) 8.3 - 10.4 MG/DL   CBC WITH AUTOMATED DIFF    Collection Time: 20  4:47 AM   Result Value Ref Range    WBC 6.4 4.3 - 11.1 K/uL    RBC 3.87 (L) 4.05 - 5.2 M/uL    HGB 11.7 11.7 - 15.4 g/dL    HCT 35.9 35.8 - 46.3 %    MCV 92.8 79.6 - 97.8 FL    MCH 30.2 26.1 - 32.9 PG    MCHC 32.6 31.4 - 35.0 g/dL    RDW 12.3 11.9 - 14.6 %    PLATELET 055 705 - 757 K/uL    MPV 10.7 9.4 - 12.3 FL    ABSOLUTE NRBC 0.00 0.0 - 0.2 K/uL    DF AUTOMATED      NEUTROPHILS 47 43 - 78 % LYMPHOCYTES 44 13 - 44 %    MONOCYTES 7 4.0 - 12.0 %    EOSINOPHILS 1 0.5 - 7.8 %    BASOPHILS 1 0.0 - 2.0 %    IMMATURE GRANULOCYTES 1 0.0 - 5.0 %    ABS. NEUTROPHILS 3.0 1.7 - 8.2 K/UL    ABS. LYMPHOCYTES 2.9 0.5 - 4.6 K/UL    ABS. MONOCYTES 0.4 0.1 - 1.3 K/UL    ABS. EOSINOPHILS 0.1 0.0 - 0.8 K/UL    ABS. BASOPHILS 0.0 0.0 - 0.2 K/UL    ABS. IMM. GRANS. 0.0 0.0 - 0.5 K/UL     6/4:  CT HEAD: Unremarkable CT head without contrast.     6/8:  CTA HEAD AND NECK:  No evidence of acute large vessel occlusion. Diminutive appearing left transverse sinus with associated arachnoid granulation, chronic based on prior remote MRI.    6/9:  MRI BRAIN:   Single nonspecific tiny FLAIR white matter hyperintensity in the  left frontal lobe. These can be seen with migraine headaches    Assessment/Plan:   Status migrainosus:  8 days duration. Longest prior has  Been 2 days. Neurology on board with plans for  Block  placement.     Minimal relief with all treatments to date     Bipolar I disorder, most recent episode depressed, severe without psychotic features:  Continues home meds    Narcolepsy    PCOS:  Home meds     Fibromyalgia:  Home meds     Care Plan discussed with: Patient and Nurse    Signed By: Arlyn Ospina NP     June 9, 2020

## 2020-06-09 NOTE — CONSULTS
[facility]rology [facility] [address]  [facility], [address] [facility]      Chief Complaint   Patient presents with    Headache       Randolph Iglesias is a 26 y.o. female who presents on referral from the hospitalist for status migrainosus    The patient reports a history of migraine headache. Clinical symptoms are as follows:     Aura: Currently denies visual changes, sensory changes, motor changes, changes in speech or language    The location of the headache is bilateral. It is gradual in onset, throbbing in quality, and is aggravated by routine activities, sound, light and smells. The pain severity ranges from moderate to debilitating and the duration is hours to days. Frequency is greater than 15 days/month. Her current headache has been going on for 10 days. She has had normal neuroimaging in the past.    The patient is seen by Dr. Vanessa Maza. Over the last 6 days she has been to the emergency department each of those days. She has received all sorts of rescue medications without great relief.        Past Medical History:   Diagnosis Date    Neurological disorder     Other ill-defined conditions(799.89)     narcalepsy    Other ill-defined conditions(799.89)     fibromyalgia    Other ill-defined conditions(799.89)     PCOS (polycystic ovarian syndrome)        Past Surgical History:   Procedure Laterality Date    HX CHOLECYSTECTOMY      HX WISDOM TEETH EXTRACTION         Family History   Problem Relation Age of Onset    Diabetes Father     Hypertension Father     Breast Cancer Neg Hx     Ovarian Cancer Neg Hx     Colon Cancer Neg Hx        Social History     Socioeconomic History    Marital status: SINGLE     Spouse name: Not on file    Number of children: Not on file    Years of education: Not on file    Highest education level: Not on file   Tobacco Use    Smoking status: Never Smoker    Smokeless tobacco: Never Used   Substance and Sexual Activity    Alcohol use: No    Drug use: No    Sexual activity: Not Currently         Current Facility-Administered Medications:     butalbital-acetaminophen-caffeine (FIORICET, ESGIC) -40 mg per tablet 1 Tab, 1 Tab, Oral, TID, Savannah Galvan MD, 1 Tab at 06/09/20 0829    citalopram (CELEXA) tablet 40 mg, 40 mg, Oral, DAILY, Savannah Galvan MD, 40 mg at 06/09/20 0458    cyclobenzaprine (FLEXERIL) tablet 10 mg, 10 mg, Oral, TID PRN, Savannah Galvan MD    [Held by provider] topiramate (TOPAMAX) tablet 100 mg, 100 mg, Oral, BID, Savannah Galvan MD    traZODone (DESYREL) tablet 150 mg, 150 mg, Oral, QHS PRN, Savannah Galvan MD    sodium chloride (NS) flush 5-40 mL, 5-40 mL, IntraVENous, Q8H, Savannah Galvan MD, 5 mL at 06/09/20 8098    sodium chloride (NS) flush 5-40 mL, 5-40 mL, IntraVENous, PRN, Saavnnah Galvan MD    acetaminophen (TYLENOL) tablet 650 mg, 650 mg, Oral, Q4H PRN, Savannah Galvan MD    enoxaparin (LOVENOX) injection 40 mg, 40 mg, SubCUTAneous, Q24H, Savannah Galvan MD, 40 mg at 06/09/20 8633    divalproex DR (DEPAKOTE) tablet 750 mg, 750 mg, Oral, BID, Savannah Galvan MD, 750 mg at 06/09/20 8216    Current Outpatient Medications:     traZODone (DESYREL) 150 mg tablet, Take 1 Tab by mouth nightly as needed for Sleep., Disp: 10 Tab, Rfl: 0    prochlorperazine (COMPAZINE) 10 mg tablet, Take 1 Tab by mouth every six (6) hours as needed for Other (as needed for nausea/vomiting/migraine). , Disp: 8 Tab, Rfl: 1    butalbital-acetaminophen-caff (Fioricet) -40 mg per capsule, Take 1-2 Caps by mouth every six (6) hours as needed for Headache., Disp: 20 Cap, Rfl: 0    metoclopramide HCl (Reglan) 10 mg tablet, Take 1 Tab by mouth every six (6) hours as needed for Nausea for up to 20 doses. , Disp: 12 Tab, Rfl: 0    doxycycline (VIBRA-TABS) 100 mg tablet, Take 1 Tab by mouth two (2) times a day for 7 days. , Disp: 14 Tab, Rfl: 0    SUMAtriptan (Imitrex) 100 mg tablet, Take 1 Tab by mouth two (2) times daily as needed for Migraine. , Disp: 10 Tab, Rfl: 0    butalbital-acetaminophen-caff (FIORICET) -40 mg per capsule, Take 1 Cap by mouth three (3) times daily. , Disp: 11 Cap, Rfl: 0    OXcarbazepine (TRILEPTAL) 150 mg tablet, Take 3 bid, Disp: 180 Tab, Rfl: 8    topiramate (TOPAMAX) 100 mg tablet, Take  by mouth two (2) times a day., Disp: , Rfl:     topiramate (TOPAMAX) 50 mg tablet, Take  by mouth two (2) times a day., Disp: , Rfl:     citalopram (CELEXA) 40 mg tablet, Take 40 mg by mouth daily. , Disp: , Rfl:     ondansetron hcl (ZOFRAN, AS HYDROCHLORIDE,) 8 mg tablet, Take 8 mg by mouth every eight (8) hours as needed for Nausea., Disp: , Rfl:     cyclobenzaprine (FLEXERIL) 10 mg tablet, Take  by mouth three (3) times daily as needed for Muscle Spasm(s). , Disp: , Rfl:     naproxen (NAPROSYN) 500 mg tablet, Take 500 mg by mouth two (2) times daily (with meals). , Disp: , Rfl:     magnesium oxide 500 mg tab, Take  by mouth., Disp: , Rfl:     topiramate (TOPAMAX) 100 mg tablet, Take 1.5 tabs bid, Disp: 90 Tab, Rfl: 9    predniSONE (STERAPRED DS) 10 mg dose pack, Please take as directed on package. Please clarify any questions with the Pharmacist., Disp: 21 Tab, Rfl: 0    diphenhydrAMINE (BENADRYL ALLERGY) 25 mg tablet, Take 1 Tab by mouth every six (6) hours as needed for Sleep., Disp: 21 Tab, Rfl: 0    EPINEPHrine (EPIPEN) 0.3 mg/0.3 mL injection, 0.3 mg by IntraMUSCular route once as needed. , Disp: , Rfl:     loratadine (CLARITIN) 10 mg tablet, Take 10 mg by mouth daily.   , Disp: , Rfl:     Allergies   Allergen Reactions    Latex, Natural Rubber Contact Dermatitis    Amoxicillin Rash    Doxycycline Other (comments)     \"hyper\"    Epinephrine Nausea and Vomiting     PLEASE REMOVE FROM ALLERGY LIST    Gluten Nausea Only    Lyrica [Pregabalin] Unknown (comments)     hallucinations    Neosporin [Benzalkonium Chloride] Hives    Penicillins Rash    Sulfa (Sulfonamide Antibiotics) Rash    Zithromax [Azithromycin] Hives       REVIEW OF SYSTEMS:  CONSTITUTIONAL: No weight loss, fever, chills, weakness or fatigue. HEENT: Eyes: No visual loss, blurred vision, double vision or yellow sclerae. Ears, Nose, Throat: No hearing loss, sneezing, congestion, runny nose or sore throat. SKIN: No rash or itching. CARDIOVASCULAR: No chest pain, chest pressure or chest discomfort. No palpitations or edema. RESPIRATORY: No shortness of breath, cough or sputum. GASTROINTESTINAL: No anorexia, nausea, vomiting or diarrhea. No abdominal pain or blood. GENITOURINARY: no burning with urination. NEUROLOGICAL: Positive for headache  MUSCULOSKELETAL: No muscle, back pain, joint pain or stiffness. HEMATOLOGIC: No anemia, bleeding or bruising. LYMPHATICS: No enlarged nodes. No history of splenectomy. 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 No polyuria or polydipsia. ALLERGIES: No history of asthma, hives, eczema or rhinitis. Physical Examination  Visit Vitals  BP 99/64   Pulse 72   Temp 97.9 °F (36.6 °C)   Resp 16   Ht 5' 8\" (1.727 m)   Wt 220 lb (99.8 kg)   SpO2 100%   BMI 33.45 kg/m²       General - Well developed, well nourished, in no apparent distress. Pleasant and conversant. Wearing sunglasses in the dark   HEENT - Normocephalic, atraumatic. Neck -No masses   Lungs - Normal work of breathing   Abdomen - No masses   Extremities - No edema and no rashes. Psychiatric - Mood and affect are normal    Neurological examination - Comprehension, attention , memory and reasoning are intact. Language and speech are normal. On cranial nerve examination pupils are equal round and reactive to light (cranial nerve II and III). Visual acuity is adequate (cranial nerve II). Visual fields are full to finger confrontation (CN II). Extraocular motility is normal (CN III, IV, VI).  Face is symmetric (CN VII). Hearing is grossly intact to verbal communication (CN VIII). Motor examination - There is normal bulk. Power is full throughout (with spontaneous movement against environmental objects). Cerebellar examination is normal with finger-no-nose testing. Gait and stance are normal.             1.  Status migrainosus    Migraine Treatment   · Ketorolac 30 mg IM every 8 hours   · Prochlorperazine 10 mg IV + Diphenhydramine 50 mg IV every 6 hours   · Magnesium 2 g IV   · Normal saline bolus, if tolerable to fluids      I try to avoid using butalbital-containing medications. I will consider doing a sphenopalatine ganglion block and/or bilateral occipital nerve blocks    I agree with trying [address] [place] [address] as an outpatient. We do not have this on inpatient formulary.           Laurie Conde, DO

## 2020-06-09 NOTE — ED NOTES
TRANSFER - OUT REPORT:    Verbal report given to Elias Levine rn on [facility]  being transferred to [address] [place] [address] for routine progression of care       Report consisted of patients Situation, Background, Assessment and   Recommendations(SBAR). Information from the following report(s) SBAR was reviewed with the receiving nurse. Lines:   Peripheral IV 06/08/20 Right Antecubital (Active)   Site Assessment Clean, dry, & intact 6/8/2020  7:56 PM        Opportunity for questions and clarification was provided.       Patient transported with:   [facility]

## 2020-06-09 NOTE — PROGRESS NOTES
Patient received from ER to room 806  via wheelchair. Patient A&o x4, respirations easy & regular, lungs clear. Admission assessment completed. Call light within reach, instructed to call for assistance as needed.

## 2020-06-09 NOTE — ED NOTES
Patient sitting on stretcher with HOB elevated at this time, respirations even and unlabored, no apparent signs of distress. Lights of room are dimmed, bed is in low and locked position, call light within reach.

## 2020-06-09 NOTE — ED PROVIDER NOTES
18-year-old female presents to the ER with 10-day episode of status migrainosus. This is her eighth ER visit and 8 consecutive days. Status post CT scan of the brain, status post lumbar puncture, blood work is been unrevealing as well. Patient seems to get mild to moderate at best relief from various migraine cocktails. She has had a virtual visit with her neurologist as well during this interval and went to the infusion center at least once or twice for the \"migraine cocktail\" that they give at the infusion center. She is photophobic with this  Headache is retro-orbital in location.   No relief with medicines at home             Past Medical History:   Diagnosis Date    Neurological disorder     Other ill-defined conditions(799.89)     narcalepsy    Other ill-defined conditions(799.89)     fibromyalgia    Other ill-defined conditions(799.89)     PCOS (polycystic ovarian syndrome)        Past Surgical History:   Procedure Laterality Date    HX CHOLECYSTECTOMY      HX WISDOM TEETH EXTRACTION           Family History:   Problem Relation Age of Onset    Diabetes Father     Hypertension Father     Breast Cancer Neg Hx     Ovarian Cancer Neg Hx     Colon Cancer Neg Hx        Social History     Socioeconomic History    Marital status: SINGLE     Spouse name: Not on file    Number of children: Not on file    Years of education: Not on file    Highest education level: Not on file   Occupational History    Not on file   Social Needs    Financial resource strain: Not on file    Food insecurity     Worry: Not on file     Inability: Not on file    Transportation needs     Medical: Not on file     Non-medical: Not on file   Tobacco Use    Smoking status: Never Smoker    Smokeless tobacco: Never Used   Substance and Sexual Activity    Alcohol use: No    Drug use: No    Sexual activity: Not Currently   Lifestyle    Physical activity     Days per week: Not on file     Minutes per session: Not on file  Stress: Not on file   Relationships    Social connections     Talks on phone: Not on file     Gets together: Not on file     Attends [employer] service: Not on file     Active member of club or organization: Not on file     Attends meetings of clubs or organizations: Not on file     Relationship status: Not on file    Intimate partner violence     Fear of current or ex partner: Not on file     Emotionally abused: Not on file     Physically abused: Not on file     Forced sexual activity: Not on file   Other Topics Concern    Not on file   Social History Narrative    Not on file         ALLERGIES: Latex, natural rubber; Amoxicillin; Doxycycline; Epinephrine; Gluten; Lyrica [pregabalin]; Neosporin [benzalkonium chloride]; Penicillins; Sulfa (sulfonamide antibiotics); and Zithromax [azithromycin]    Review of Systems   Constitutional: Negative for chills and fever. HENT: Negative for rhinorrhea and sore throat. Eyes: Positive for photophobia. Negative for discharge and redness. Respiratory: Negative for cough and shortness of breath. Cardiovascular: Negative for chest pain and palpitations. Gastrointestinal: Positive for nausea. Negative for abdominal pain, diarrhea and vomiting. Musculoskeletal: Negative for arthralgias and back pain. Skin: Negative for rash. Neurological: Positive for headaches. Negative for dizziness. All other systems reviewed and are negative. Vitals:    06/08/20 1939   BP: 101/62   Pulse: 91   Resp: 16   Temp: 98.3 °F (36.8 °C)   SpO2: 97%   Weight: 99.8 kg (220 lb)   Height: 5' 8\" (1.727 m)            Physical Exam  Vitals signs and nursing note reviewed. Constitutional:       General: She is in acute distress. Appearance: Normal appearance. She is well-developed. She is obese. She is not ill-appearing, toxic-appearing or diaphoretic. HENT:      Head: Normocephalic and atraumatic. Eyes:      General:         Right eye: No discharge.          Left eye: No discharge. Extraocular Movements: Extraocular movements intact. Conjunctiva/sclera: Conjunctivae normal.      Pupils: Pupils are equal, round, and reactive to light. Neck:      Musculoskeletal: Normal range of motion and neck supple. Pulmonary:      Effort: Pulmonary effort is normal. No respiratory distress. Musculoskeletal: Normal range of motion. Skin:     General: Skin is warm and dry. Findings: No rash. Neurological:      General: No focal deficit present. Mental Status: She is alert and oriented to person, place, and time. Mental status is at baseline. Motor: No abnormal muscle tone. Comments: cni 2-12 grossly  Nl gait,  Nl speech     Psychiatric:         Mood and Affect: Mood normal.         Behavior: Behavior normal.          MDM  Number of Diagnoses or Management Options  Status migrainosus:   Diagnosis management comments: Medical decision making note:  Status migrainosus x10 days,  Seen by tele-neurology who got a history of some right-sided weakness and recommends MRI tomorrow. Also recommends a CTA/CTV  Admit to hospitalist  This concludes the \"medical decision making note\" part of this emergency department visit note.            Procedures

## 2020-06-09 NOTE — PROGRESS NOTES
06/09/20 1324   Dual Skin Pressure Injury Assessment   Dual Skin Pressure Injury Assessment WDL   Second Care Provider (Based on [address] [place] [address]) Veronica Seip RN   Skin Integumentary   Skin Integumentary (WDL) WDL    Pressure  Injury Documentation No Pressure Injury Noted-Pressure Ulcer Prevention Initiated

## 2020-06-10 LAB
ANION GAP SERPL CALC-SCNC: 6 MMOL/L (ref 7–16)
BASOPHILS # BLD: 0 K/UL (ref 0–0.2)
BASOPHILS NFR BLD: 0 % (ref 0–2)
BUN SERPL-MCNC: 26 MG/DL (ref 6–23)
CALCIUM SERPL-MCNC: 8.1 MG/DL (ref 8.3–10.4)
CHLORIDE SERPL-SCNC: 105 MMOL/L (ref 98–107)
CO2 SERPL-SCNC: 30 MMOL/L (ref 21–32)
CREAT SERPL-MCNC: 0.64 MG/DL (ref 0.6–1)
DIFFERENTIAL METHOD BLD: ABNORMAL
EOSINOPHIL # BLD: 0.1 K/UL (ref 0–0.8)
EOSINOPHIL NFR BLD: 1 % (ref 0.5–7.8)
ERYTHROCYTE [DISTWIDTH] IN BLOOD BY AUTOMATED COUNT: 12.4 % (ref 11.9–14.6)
GLUCOSE SERPL-MCNC: 76 MG/DL (ref 65–100)
HCT VFR BLD AUTO: 35.9 % (ref 35.8–46.3)
HGB BLD-MCNC: 12.3 G/DL (ref 11.7–15.4)
IMM GRANULOCYTES # BLD AUTO: 0.1 K/UL (ref 0–0.5)
IMM GRANULOCYTES NFR BLD AUTO: 1 % (ref 0–5)
LYMPHOCYTES # BLD: 2.5 K/UL (ref 0.5–4.6)
LYMPHOCYTES NFR BLD: 36 % (ref 13–44)
MCH RBC QN AUTO: 31.4 PG (ref 26.1–32.9)
MCHC RBC AUTO-ENTMCNC: 34.3 G/DL (ref 31.4–35)
MCV RBC AUTO: 91.6 FL (ref 79.6–97.8)
MONOCYTES # BLD: 0.6 K/UL (ref 0.1–1.3)
MONOCYTES NFR BLD: 9 % (ref 4–12)
NEUTS SEG # BLD: 3.6 K/UL (ref 1.7–8.2)
NEUTS SEG NFR BLD: 52 % (ref 43–78)
NRBC # BLD: 0 K/UL (ref 0–0.2)
PLATELET # BLD AUTO: 192 K/UL (ref 150–450)
PMV BLD AUTO: 9.9 FL (ref 9.4–12.3)
POTASSIUM SERPL-SCNC: 3.9 MMOL/L (ref 3.5–5.1)
RBC # BLD AUTO: 3.92 M/UL (ref 4.05–5.2)
SODIUM SERPL-SCNC: 141 MMOL/L (ref 136–145)
WBC # BLD AUTO: 6.9 K/UL (ref 4.3–11.1)

## 2020-06-10 PROCEDURE — 99218 HC RM OBSERVATION: CPT

## 2020-06-10 PROCEDURE — 99215 OFFICE O/P EST HI 40 MIN: CPT | Performed by: PSYCHIATRY & NEUROLOGY

## 2020-06-10 PROCEDURE — 74011250637 HC RX REV CODE- 250/637: Performed by: NURSE PRACTITIONER

## 2020-06-10 PROCEDURE — 3E0T3BZ INTRODUCTION OF ANESTHETIC AGENT INTO PERIPHERAL NERVES AND PLEXI, PERCUTANEOUS APPROACH: ICD-10-PCS | Performed by: FAMILY MEDICINE

## 2020-06-10 PROCEDURE — 36415 COLL VENOUS BLD VENIPUNCTURE: CPT

## 2020-06-10 PROCEDURE — 74011250636 HC RX REV CODE- 250/636: Performed by: PSYCHIATRY & NEUROLOGY

## 2020-06-10 PROCEDURE — 96376 TX/PRO/DX INJ SAME DRUG ADON: CPT

## 2020-06-10 PROCEDURE — 85025 COMPLETE CBC W/AUTO DIFF WBC: CPT

## 2020-06-10 PROCEDURE — 74011000250 HC RX REV CODE- 250: Performed by: NURSE PRACTITIONER

## 2020-06-10 PROCEDURE — 96372 THER/PROPH/DIAG INJ SC/IM: CPT

## 2020-06-10 PROCEDURE — 74011000250 HC RX REV CODE- 250: Performed by: PSYCHIATRY & NEUROLOGY

## 2020-06-10 PROCEDURE — 80048 BASIC METABOLIC PNL TOTAL CA: CPT

## 2020-06-10 PROCEDURE — 74011250636 HC RX REV CODE- 250/636: Performed by: FAMILY MEDICINE

## 2020-06-10 PROCEDURE — 74011250637 HC RX REV CODE- 250/637: Performed by: FAMILY MEDICINE

## 2020-06-10 RX ORDER — HYOSCYAMINE SULFATE 0.12 MG/1
0.12 TABLET SUBLINGUAL
Status: DISCONTINUED | OUTPATIENT
Start: 2020-06-10 | End: 2020-06-11 | Stop reason: HOSPADM

## 2020-06-10 RX ORDER — LOPERAMIDE HYDROCHLORIDE 2 MG/1
2 CAPSULE ORAL
Status: DISCONTINUED | OUTPATIENT
Start: 2020-06-10 | End: 2020-06-11 | Stop reason: HOSPADM

## 2020-06-10 RX ORDER — LIDOCAINE HYDROCHLORIDE 10 MG/ML
10 INJECTION INFILTRATION; PERINEURAL ONCE
Status: COMPLETED | OUTPATIENT
Start: 2020-06-10 | End: 2020-06-10

## 2020-06-10 RX ADMIN — HYOSCYAMINE SULFATE 0.12 MG: 0.12 TABLET ORAL; SUBLINGUAL at 17:25

## 2020-06-10 RX ADMIN — DIPHENHYDRAMINE HYDROCHLORIDE 50 MG: 50 INJECTION, SOLUTION INTRAMUSCULAR; INTRAVENOUS at 00:40

## 2020-06-10 RX ADMIN — LIDOCAINE HYDROCHLORIDE 10 ML: 10 INJECTION, SOLUTION INFILTRATION; PERINEURAL at 09:00

## 2020-06-10 RX ADMIN — KETOROLAC TROMETHAMINE 30 MG: 30 INJECTION, SOLUTION INTRAMUSCULAR at 05:22

## 2020-06-10 RX ADMIN — PROCHLORPERAZINE EDISYLATE 10 MG: 5 INJECTION INTRAMUSCULAR; INTRAVENOUS at 17:26

## 2020-06-10 RX ADMIN — CYCLOBENZAPRINE 10 MG: 10 TABLET, FILM COATED ORAL at 16:13

## 2020-06-10 RX ADMIN — DIPHENHYDRAMINE HYDROCHLORIDE 50 MG: 50 INJECTION, SOLUTION INTRAMUSCULAR; INTRAVENOUS at 12:34

## 2020-06-10 RX ADMIN — DIPHENHYDRAMINE HYDROCHLORIDE 50 MG: 50 INJECTION, SOLUTION INTRAMUSCULAR; INTRAVENOUS at 17:26

## 2020-06-10 RX ADMIN — KETOROLAC TROMETHAMINE 30 MG: 30 INJECTION, SOLUTION INTRAMUSCULAR at 12:35

## 2020-06-10 RX ADMIN — PROCHLORPERAZINE EDISYLATE 10 MG: 5 INJECTION INTRAMUSCULAR; INTRAVENOUS at 00:40

## 2020-06-10 RX ADMIN — PROCHLORPERAZINE EDISYLATE 10 MG: 5 INJECTION INTRAMUSCULAR; INTRAVENOUS at 05:19

## 2020-06-10 RX ADMIN — DIVALPROEX SODIUM 750 MG: 500 TABLET, DELAYED RELEASE ORAL at 08:49

## 2020-06-10 RX ADMIN — KETOROLAC TROMETHAMINE 30 MG: 30 INJECTION, SOLUTION INTRAMUSCULAR at 22:26

## 2020-06-10 RX ADMIN — PROCHLORPERAZINE EDISYLATE 10 MG: 5 INJECTION INTRAMUSCULAR; INTRAVENOUS at 12:34

## 2020-06-10 RX ADMIN — Medication 10 ML: at 22:30

## 2020-06-10 RX ADMIN — ENOXAPARIN SODIUM 40 MG: 40 INJECTION SUBCUTANEOUS at 08:49

## 2020-06-10 RX ADMIN — Medication 10 ML: at 17:33

## 2020-06-10 RX ADMIN — DIVALPROEX SODIUM 750 MG: 500 TABLET, DELAYED RELEASE ORAL at 17:26

## 2020-06-10 RX ADMIN — DIPHENHYDRAMINE HYDROCHLORIDE 50 MG: 50 INJECTION, SOLUTION INTRAMUSCULAR; INTRAVENOUS at 05:23

## 2020-06-10 RX ADMIN — Medication 5 ML: at 12:35

## 2020-06-10 RX ADMIN — Medication 10 ML: at 05:19

## 2020-06-10 RX ADMIN — CITALOPRAM HYDROBROMIDE 40 MG: 20 TABLET ORAL at 08:49

## 2020-06-10 NOTE — PROGRESS NOTES
[facility] Neurology [facility] 61  [address], [address]      Chief Complaint   Patient presents with    Headache     Interval history: Continues to have moderate severity migraine with light sensitivity and noise sensitivity. Janelle Dickerson is a 26 y.o. female who presents on referral from the hospitalist for status migrainosus    The patient reports a history of migraine headache. Clinical symptoms are as follows:     Aura: Currently denies visual changes, sensory changes, motor changes, changes in speech or language    The location of the headache is bilateral. It is gradual in onset, throbbing in quality, and is aggravated by routine activities, sound, light and smells. The pain severity ranges from moderate to debilitating and the duration is hours to days. Frequency is greater than 15 days/month. Her current headache has been going on for 10 days. She has had normal neuroimaging in the past.    The patient is seen by Dr. Jamie Christie. Over the last 6 days she has been to the emergency department each of those days. She has received all sorts of rescue medications without great relief.        Past Medical History:   Diagnosis Date    Neurological disorder     Other ill-defined conditions(799.89)     narcalepsy    Other ill-defined conditions(799.89)     fibromyalgia    Other ill-defined conditions(799.89)     PCOS (polycystic ovarian syndrome)        Past Surgical History:   Procedure Laterality Date    HX CHOLECYSTECTOMY      HX WISDOM TEETH EXTRACTION         Family History   Problem Relation Age of Onset    Diabetes Father     Hypertension Father     Breast Cancer Neg Hx     Ovarian Cancer Neg Hx     Colon Cancer Neg Hx        Social History     Socioeconomic History    Marital status: SINGLE     Spouse name: Not on file    Number of children: Not on file    Years of education: Not on file    Highest education level: Not on file   Tobacco Use    Smoking status: Never Smoker    Smokeless tobacco: Never Used   Substance and Sexual Activity    Alcohol use: No    Drug use: No    Sexual activity: Not Currently         Current Facility-Administered Medications:     lidocaine (XYLOCAINE) 10 mg/mL (1 %) injection 10 mL, 10 mL, SubCUTAneous, ONCE, Komal Dumont, NP    citalopram (CELEXA) tablet 40 mg, 40 mg, Oral, DAILY, Alpa Frederick MD, 40 mg at 06/10/20 0849    cyclobenzaprine (FLEXERIL) tablet 10 mg, 10 mg, Oral, TID PRN, Alpa Frederick MD    [Held by provider] topiramate (TOPAMAX) tablet 100 mg, 100 mg, Oral, BID, Jojo Duff MD    traZODone (DESYREL) tablet 150 mg, 150 mg, Oral, QHS PRN, Alpa Frederick MD    sodium chloride (NS) flush 5-40 mL, 5-40 mL, IntraVENous, Q8H, Alpa Frederick MD, 10 mL at 06/10/20 0519    sodium chloride (NS) flush 5-40 mL, 5-40 mL, IntraVENous, PRN, Alpa Frederick MD    acetaminophen (TYLENOL) tablet 650 mg, 650 mg, Oral, Q4H PRN, Alpa Frederick MD    enoxaparin (LOVENOX) injection 40 mg, 40 mg, SubCUTAneous, Q24H, Alpa Frederick MD, 40 mg at 06/10/20 0849    divalproex DR (DEPAKOTE) tablet 750 mg, 750 mg, Oral, BID, Alpa Frederick MD, 750 mg at 06/10/20 8892    ketorolac (TORADOL) injection 30 mg, 30 mg, IntraMUSCular, Q8H, Tommy Cain DO, 30 mg at 06/10/20 0522    diphenhydrAMINE (BENADRYL) injection 50 mg, 50 mg, IntraVENous, Q6H, Tommy Cain DO, 50 mg at 06/10/20 9464    prochlorperazine (COMPAZINE) with saline injection 10 mg, 10 mg, IntraVENous, Q6H, Tommy Cain DO, 10 mg at 06/10/20 5692    Allergies   Allergen Reactions    Latex, Natural Rubber Contact Dermatitis    Amoxicillin Rash    Doxycycline Other (comments)     \"hyper\"    Epinephrine Nausea and Vomiting     PLEASE REMOVE FROM ALLERGY LIST    Gluten Nausea Only    Lyrica [Pregabalin] Unknown (comments)     hallucinations    Neosporin [Benzalkonium Chloride] Hives    Penicillins Rash    Sulfa (Sulfonamide Antibiotics) Rash    Zithromax [Azithromycin] Hives       REVIEW OF SYSTEMS:  CONSTITUTIONAL: No weight loss, fever, chills, weakness or fatigue. HEENT: Eyes: No visual loss, blurred vision, double vision or yellow sclerae. Ears, Nose, Throat: No hearing loss, sneezing, congestion, runny nose or sore throat. SKIN: No rash or itching. CARDIOVASCULAR: No chest pain, chest pressure or chest discomfort. No palpitations or edema. Physical Examination  Visit Vitals  BP 97/54 (BP 1 Location: Left arm, BP Patient Position: At rest)   Pulse 63   Temp 97.5 °F (36.4 °C)   Resp 16   Ht 5' 8\" (1.727 m)   Wt 220 lb (99.8 kg)   SpO2 98%   BMI 33.45 kg/m²       General - Well developed, well nourished, in no apparent distress. Pleasant and conversant. Wearing sunglasses in the dark   HEENT - Normocephalic, atraumatic. Neck -No masses   Lungs - Normal work of breathing   Abdomen - No masses   Extremities - No edema and no rashes. Psychiatric - Mood and affect are normal    Neurological examination - Comprehension, attention , memory and reasoning are intact. Language and speech are normal. On cranial nerve examination pupils are equal round and reactive to light (cranial nerve II and III). Visual acuity is adequate (cranial nerve II). Visual fields are full to finger confrontation (CN II). Extraocular motility is normal (CN III, IV, VI). Face is symmetric (CN VII). Hearing is grossly intact to verbal communication (CN VIII). Motor examination - There is normal bulk. Power is full throughout (with spontaneous movement against environmental objects). Cerebellar examination is normal with finger-no-nose testing.  Gait and stance are normal.             1.  Status migrainosus    Migraine Treatment   · Ketorolac 30 mg IM every 8 hours   · Prochlorperazine 10 mg IV + Diphenhydramine 50 mg IV every 6 hours   · Magnesium 2 g IV   · Normal saline bolus, if tolerable to fluids      I try to avoid using butalbital-containing medications. I performed a bilateral greater and lesser occipital nerve blocks at bedside. Hopefully this will bring about relief. A sphenopalatine ganglion block could also be attempted but I would wait 24 hours before doing that. This can also be done as an outpatient. Continue Depakote as an outpatient. Consider adding Aimovig as an outpatient.         Apoorva Crimes, DO

## 2020-06-10 NOTE — PROCEDURES
Procedure Note:  Bilateral Greater and Lesser occipital Nerve Blocks  Indications: Status migrainosus  Informed consent was obtained (explaining the procedure and risks and benefits of procedure) from patient: this included the risk of bleeding, infection, pain. A time out was completed, verifying correct patient, procedure,site, positioning, and implants or special equipment. Patient's left occipital area was palpated to identify location of greater occipital nerve. Alcohol was applied topically to the skin. Using a 25 gauge needle (aspirating during insertion), 3 cc of 1% lidocaine was injected on the left side (directing needle to center, left and right of painful focus) at the location of the greater occipital nerve and then repeated at the site of the lesser occipital nerve. The procedure was then repeated at the right greater occipital nerve and right lesser occipital nerve in the exact same manner. Pressure with a gauze pad was held briefly upon the site of puncture to minimize bleeding and to further spread anaesthetic subcutaneously.  Patient was comfortable and left without complaint

## 2020-06-10 NOTE — PROGRESS NOTES
Hourly rounds performed. All needs met. Bed is in low position and call light is within reach. Pt complained of migraine during shift. Pain managed per [facility] w/ no other complaints. Will continue to monitor and report to oncoming nurse.

## 2020-06-10 NOTE — PROGRESS NOTES
MSN, CM:  Spoke with patient this AM about discharge planning. Patient lives alone in a downstairs apartment in her grandparent's home. Her grandparents \"Anahy and Ariel Obrien" allow her to stay there to help her out. Patient has several family members that live close by. Patient is independent with all ADL's and requires no equipment for ambulation. Patient denies any home oxygen, HH or rehab in the past.  Patient confirms PCP is at [facility] but does not remember the name. Her mother \"Denise\" drives her to all appointments r/t patient has hx of seizures. Patient does not appear to have any discharge needs at this time but Case Management will continue to follow for any needs that may arise. Care Management Interventions  PCP Verified by CM: Yes([facility])  Mode of Transport at Discharge: Other (see comment)(family to transport)  Transition of Care Consult (CM Consult): Discharge Planning  Current Support Network:  Other(Down  stairs apartment in grandparents home)  Confirm Follow Up Transport: Family  Freedom of Choice List was Provided with Basic Dialogue that Supports the Patient's Individualized Plan of Care/Goals, Treatment Preferences and Shares the Quality Data Associated with the Providers?: Yes  Discharge Location  Discharge Placement: Home

## 2020-06-10 NOTE — PROGRESS NOTES
Problem: Falls - Risk of  Goal: *Absence of Falls  Description: Document Shanti Mauricio Fall Risk and appropriate interventions in the flowsheet.   Outcome: Progressing Towards Goal  Note: Fall Risk Interventions:            Medication Interventions: Evaluate medications/consider consulting pharmacy, Patient to call before getting OOB, Teach patient to arise slowly                   Problem: Patient Education: Go to Patient Education Activity  Goal: Patient/Family Education  Outcome: Progressing Towards Goal     Problem: Pain  Goal: *Control of Pain  Outcome: Progressing Towards Goal  Goal: *PALLIATIVE CARE:  Alleviation of Pain  Outcome: Progressing Towards Goal     Problem: Patient Education: Go to Patient Education Activity  Goal: Patient/Family Education  Outcome: Progressing Towards Goal

## 2020-06-10 NOTE — PROGRESS NOTES
At [address] patient in bed. Lungs clear to auscultation. Active bowel sounds. Patient complaining of abdominal cramping, perfectserve note that was sent to Tory Chun- \"Mrs. Francisco Mead has started having abdominal pain. She has had 3x diarrhea in 3 hour period, cramping pain. Patient states she feels it is her IBS. Is there anything I can give her for her. \" Order was put in by MD GONZALO Lamb at this time. Call bell within reach. Side rails up x3. Bed low and locked. No distress noted. Family member at bedside.

## 2020-06-10 NOTE — PHYSICIAN ADVISORY
Letter of Status Determination:   Recommend hospitalization status upgraded from   OBSERVATION  to INPATIENT  Status     Pt Name:  Marlon Coley   MR#   [address] [facility] # [id number] /  [id number]  Payor: SELF PAY / Plan: [place] [address] / Product Type: Self Pay /    CSN#  [id number]   Room and Hospital  806/01  @ [address] [facility]   Hospitalization date  6/8/2020  7:56 PM   Current Attending Physician  Mera Garcia MD   Principal diagnosis  Status migrainosus      Clinicals  Patient is a 26yoF with PMH significant for migraines and bipolar disorder who presents with status migrainosus. Patient's migraine started last Saturday; she has been having constant migraine for 10 days. She has been to the ER every day for 8 days. She has underwent LP, CT, and multiple lab draws. She has gotten multiple medication \"cocktails\" to help with migraine with minimal improvement. She completed a virtual visit with her neurologist who ordered infusion at [facility], but this also did not help. She reports the pain as 9/10, and the best improvement that has occurred over the last week as been to a 3/10 pain level. She has been able to get some rest at night with her home medications. Patient is severely photophobic, with associated nausea and vomiting. Reports that her head hurts most behind her eyes; it is described as sharp. Tele-neuro was consulted.   Hospitalist to admit.     Continued pain, requiring IV meds,      Milliman ([facility]) criteria   Does  NOT apply    STATUS DETERMINATION  INPATIENT    The final decision of the patient's hospitalization status depends on the attending physician's judgment    Additional comments     Payor: SELF PAY / Plan: [facility] SELF PAY / Product Type: Self Pay /         Jitendra Hastings MD  Cell: [phone]  Physician Advisor

## 2020-06-10 NOTE — PROGRESS NOTES
Progress Note    6/10/2020  Admit Date: 2020  7:56 PM   NAME: Brandi Gutierrez   :  1990   MRN:  [id number]   Attending: Lamont Julio MD  PCP:  Daniela Gallegos MD  Treatment Team: Attending Provider: Lamont Julio MD; Consulting Provider: Nikita Trinh DO; Utilization Review: Denia Garza; Nurse Practitioner: Rahat Wynn NP; Care Manager: Lui Mayorga RN; Staff Nurse: Kurtis Ulloa    Full Code   SUBJECTIVE:     Ms. Chad Swan is a 27 yo female with PMH of fibromyalgia, PCOS, bipolar disorder, narcolepsy, migraines who presented with c/o retro orbital HA X10 days with photophobia and phonophobia. Pt has been seen numerous times in the ED and virtual visits with her Neurologist.  CT head, LP without acute findings. MRI brain showed no acute findings. Neurology consulted. Plans for a bilateral greater and lesser occipital nerve block today. Pt continues to c/o photophobia, phonophobia, HA that has minimally improved.        Past Medical History:   Diagnosis Date    Neurological disorder     Other ill-defined conditions(799.89)     narcalepsy    Other ill-defined conditions(799.89)     fibromyalgia    Other ill-defined conditions(799.89)     PCOS (polycystic ovarian syndrome)      Recent Results (from the past 24 hour(s))   METABOLIC PANEL, BASIC    Collection Time: 06/10/20  6:23 AM   Result Value Ref Range    Sodium 141 136 - 145 mmol/L    Potassium 3.9 3.5 - 5.1 mmol/L    Chloride 105 98 - 107 mmol/L    CO2 30 21 - 32 mmol/L    Anion gap 6 (L) 7 - 16 mmol/L    Glucose 76 65 - 100 mg/dL    BUN 26 (H) 6 - 23 MG/DL    Creatinine 0.64 0.6 - 1.0 MG/DL    GFR est AA >60 >60 ml/min/1.73m2    GFR est non-AA >60 >60 ml/min/1.73m2    Calcium 8.1 (L) 8.3 - 10.4 MG/DL   CBC WITH AUTOMATED DIFF    Collection Time: 06/10/20  6:23 AM   Result Value Ref Range    WBC 6.9 4.3 - 11.1 K/uL    RBC 3.92 (L) 4.05 - 5.2 M/uL    HGB 12.3 11.7 - 15.4 g/dL    HCT 35.9 35.8 - 46.3 %    MCV 91.6 79.6 - 97.8 FL    MCH 31.4 26.1 - 32.9 PG    MCHC 34.3 31.4 - 35.0 g/dL    RDW 12.4 11.9 - 14.6 %    PLATELET 279 498 - 557 K/uL    MPV 9.9 9.4 - 12.3 FL    ABSOLUTE NRBC 0.00 0.0 - 0.2 K/uL    DF AUTOMATED      NEUTROPHILS 52 43 - 78 %    LYMPHOCYTES 36 13 - 44 %    MONOCYTES 9 4.0 - 12.0 %    EOSINOPHILS 1 0.5 - 7.8 %    BASOPHILS 0 0.0 - 2.0 %    IMMATURE GRANULOCYTES 1 0.0 - 5.0 %    ABS. NEUTROPHILS 3.6 1.7 - 8.2 K/UL    ABS. LYMPHOCYTES 2.5 0.5 - 4.6 K/UL    ABS. MONOCYTES 0.6 0.1 - 1.3 K/UL    ABS. EOSINOPHILS 0.1 0.0 - 0.8 K/UL    ABS. BASOPHILS 0.0 0.0 - 0.2 K/UL    ABS. IMM.  GRANS. 0.1 0.0 - 0.5 K/UL     Allergies   Allergen Reactions    Latex, Natural Rubber Contact Dermatitis    Amoxicillin Rash    Doxycycline Other (comments)     \"hyper\"    Epinephrine Nausea and Vomiting     PLEASE REMOVE FROM ALLERGY LIST    Gluten Nausea Only    Lyrica [Pregabalin] Unknown (comments)     hallucinations    Neosporin [Benzalkonium Chloride] Hives    Penicillins Rash    Sulfa (Sulfonamide Antibiotics) Rash    Zithromax [Azithromycin] Hives     Current Facility-Administered Medications   Medication Dose Route Frequency Provider Last Rate Last Dose    citalopram (CELEXA) tablet 40 mg  40 mg Oral DAILY Shelton Rachel MD   40 mg at 06/10/20 0849    cyclobenzaprine (FLEXERIL) tablet 10 mg  10 mg Oral TID PRN Shelton Rachel MD        [Held by provider] topiramate (TOPAMAX) tablet 100 mg  100 mg Oral BID Shelton Rachel MD        traZODone (DESYREL) tablet 150 mg  150 mg Oral QHS PRN Shelton Rachel MD        sodium chloride (NS) flush 5-40 mL  5-40 mL IntraVENous Q8H Shelton Rachel MD   5 mL at 06/10/20 1235    sodium chloride (NS) flush 5-40 mL  5-40 mL IntraVENous PRN Shelton Rachel MD        acetaminophen (TYLENOL) tablet 650 mg  650 mg Oral Q4H PRN Shelton Rachel MD        enoxaparin (LOVENOX) injection 40 mg  40 mg SubCUTAneous Q24H Shelton Rachel MD   40 mg at 06/10/20 0849    divalproex DR (DEPAKOTE) tablet 750 mg  750 mg Oral BID Timothy Costa MD   750 mg at 06/10/20 0849    ketorolac (TORADOL) injection 30 mg  30 mg IntraMUSCular Q8H Tommy Cain, DO   30 mg at 06/10/20 1235    diphenhydrAMINE (BENADRYL) injection 50 mg  50 mg IntraVENous Q6H Tommy Cain, DO   50 mg at 06/10/20 1234    prochlorperazine (COMPAZINE) with saline injection 10 mg  10 mg IntraVENous Q6H Tommy Cain, DO   10 mg at 06/10/20 1234       Review of Systems negative with exception of pertinent positives noted above  PHYSICAL EXAM     Visit Vitals  /53 (BP 1 Location: Left arm, BP Patient Position: At rest)   Pulse 70   Temp 97.6 °F (36.4 °C)   Resp 15   Ht 5' 8\" (1.727 m)   Wt 99.8 kg (220 lb)   SpO2 99%   BMI 33.45 kg/m²      Temp (24hrs), Av.8 °F (36.6 °C), Min:97.5 °F (36.4 °C), Max:98.1 °F (36.7 °C)    Oxygen Therapy  O2 Sat (%): 99 % (06/10/20 1228)  O2 Device: Room air (06/10/20 1400)    Intake/Output Summary (Last 24 hours) at 6/10/2020 1502  Last data filed at 2020 2330  Gross per 24 hour   Intake 240 ml   Output --   Net 240 ml      General: No acute distress, appears in pain    Lungs: CTA bilaterally. Resp even and nonlabored  Heart:  S1S2 present without murmurs rubs gallops. RRR. No LE edema  Abdomen: Soft, non tender, non distended. BS present  Extremities: Moves ext spontaneously. No cyanosis  Neurologic:  A/O X3. PERRLA. Peripheral vision intact. EOMs intact. No nystagmus. Bilateral UE/LE strength 5/5 without drift. Sensation intact.      Results summary of Diagnostic Studies/Procedures copied from within [facility] EMR:        [address] Problems    Diagnosis Date Noted    Status migrainosus 2020    Bipolar I disorder, most recent episode depressed, severe without psychotic features ([facility] [address] 2020     Plan:    Status Migrainosus  Neurology following  Plans for nerve block today  CT head, CTA head/neck, MRI brain without acute findings.   Per Neuro:  Migraine Treatment   · Ketorolac 30 mg IM every 8 hours   · Prochlorperazine 10 mg IV + Diphenhydramine 50 mg IV every 6 hours   · Magnesium 2 g IV   · Normal saline bolus, if tolerable to fluids     Bipolar 1 disorder  Continue home meds  Denies SI/HI    PCOS  Home meds    Fibromyalgia  Continue home meds      Notes, labs, VS,diagnostic testing reviewed  Time spent with pt 35 min        DVT Prophylaxis: SCD  Plan of Care Discussed with: Supervising MD Dr. Philip Dickinson, care team, pt      Alena Aparicio NP

## 2020-06-10 NOTE — PROGRESS NOTES
Levsin 0.125 mg given for stomach cramps pain of 4/10. Call bell within reach. Side rails up x3. Bed low and locked. No distress noted.

## 2020-06-11 ENCOUNTER — APPOINTMENT (OUTPATIENT)
Dept: ULTRASOUND IMAGING | Age: 30
End: 2020-06-11
Attending: EMERGENCY MEDICINE
Payer: SELF-PAY

## 2020-06-11 ENCOUNTER — HOSPITAL ENCOUNTER (EMERGENCY)
Age: 30
Discharge: HOME | End: 2020-06-11
Attending: EMERGENCY MEDICINE
Payer: SELF-PAY

## 2020-06-11 VITALS
RESPIRATION RATE: 20 BRPM | TEMPERATURE: 98.4 F | HEART RATE: 110 BPM | SYSTOLIC BLOOD PRESSURE: 113 MMHG | HEIGHT: 68 IN | OXYGEN SATURATION: 95 % | BODY MASS INDEX: 33.34 KG/M2 | WEIGHT: 220 LBS | DIASTOLIC BLOOD PRESSURE: 69 MMHG

## 2020-06-11 VITALS
SYSTOLIC BLOOD PRESSURE: 108 MMHG | TEMPERATURE: 100.5 F | OXYGEN SATURATION: 98 % | HEART RATE: 109 BPM | RESPIRATION RATE: 20 BRPM | BODY MASS INDEX: 33.34 KG/M2 | HEIGHT: 68 IN | DIASTOLIC BLOOD PRESSURE: 62 MMHG | WEIGHT: 220 LBS

## 2020-06-11 DIAGNOSIS — I80.9 THROMBOPHLEBITIS: ICD-10-CM

## 2020-06-11 DIAGNOSIS — I82.621 ACUTE DEEP VEIN THROMBOSIS (DVT) OF RIGHT UPPER EXTREMITY, UNSPECIFIED VEIN (HCC): Primary | ICD-10-CM

## 2020-06-11 LAB
ALBUMIN SERPL-MCNC: 3.4 G/DL (ref 3.5–5)
ALBUMIN/GLOB SERPL: 1 {RATIO} (ref 1.2–3.5)
ALP SERPL-CCNC: 56 U/L (ref 50–136)
ALT SERPL-CCNC: 30 U/L (ref 12–65)
ANION GAP SERPL CALC-SCNC: 6 MMOL/L (ref 7–16)
ANION GAP SERPL CALC-SCNC: 7 MMOL/L (ref 7–16)
APPEARANCE UR: CLEAR
AST SERPL-CCNC: 18 U/L (ref 15–37)
BASOPHILS # BLD: 0 K/UL (ref 0–0.2)
BASOPHILS # BLD: 0 K/UL (ref 0–0.2)
BASOPHILS NFR BLD: 0 % (ref 0–2)
BASOPHILS NFR BLD: 0 % (ref 0–2)
BILIRUB SERPL-MCNC: 0.3 MG/DL (ref 0.2–1.1)
BILIRUB UR QL: NEGATIVE
BUN SERPL-MCNC: 16 MG/DL (ref 6–23)
BUN SERPL-MCNC: 19 MG/DL (ref 6–23)
CALCIUM SERPL-MCNC: 8.3 MG/DL (ref 8.3–10.4)
CALCIUM SERPL-MCNC: 9.3 MG/DL (ref 8.3–10.4)
CHLORIDE SERPL-SCNC: 102 MMOL/L (ref 98–107)
CHLORIDE SERPL-SCNC: 106 MMOL/L (ref 98–107)
CO2 SERPL-SCNC: 28 MMOL/L (ref 21–32)
CO2 SERPL-SCNC: 28 MMOL/L (ref 21–32)
COLOR UR: YELLOW
CREAT SERPL-MCNC: 0.73 MG/DL (ref 0.6–1)
CREAT SERPL-MCNC: 0.86 MG/DL (ref 0.6–1)
DIFFERENTIAL METHOD BLD: ABNORMAL
DIFFERENTIAL METHOD BLD: ABNORMAL
EOSINOPHIL # BLD: 0 K/UL (ref 0–0.8)
EOSINOPHIL # BLD: 0.1 K/UL (ref 0–0.8)
EOSINOPHIL NFR BLD: 0 % (ref 0.5–7.8)
EOSINOPHIL NFR BLD: 1 % (ref 0.5–7.8)
ERYTHROCYTE [DISTWIDTH] IN BLOOD BY AUTOMATED COUNT: 12.3 % (ref 11.9–14.6)
ERYTHROCYTE [DISTWIDTH] IN BLOOD BY AUTOMATED COUNT: 12.3 % (ref 11.9–14.6)
GLOBULIN SER CALC-MCNC: 3.4 G/DL (ref 2.3–3.5)
GLUCOSE SERPL-MCNC: 85 MG/DL (ref 65–100)
GLUCOSE SERPL-MCNC: 94 MG/DL (ref 65–100)
GLUCOSE UR STRIP.AUTO-MCNC: NEGATIVE MG/DL
HCT VFR BLD AUTO: 36.5 % (ref 35.8–46.3)
HCT VFR BLD AUTO: 38.6 % (ref 35.8–46.3)
HGB BLD-MCNC: 12 G/DL (ref 11.7–15.4)
HGB BLD-MCNC: 12.7 G/DL (ref 11.7–15.4)
HGB UR QL STRIP: NEGATIVE
IMM GRANULOCYTES # BLD AUTO: 0.1 K/UL (ref 0–0.5)
IMM GRANULOCYTES # BLD AUTO: 0.1 K/UL (ref 0–0.5)
IMM GRANULOCYTES NFR BLD AUTO: 1 % (ref 0–5)
IMM GRANULOCYTES NFR BLD AUTO: 1 % (ref 0–5)
KETONES UR QL STRIP.AUTO: NEGATIVE MG/DL
LACTATE SERPL-SCNC: 1.1 MMOL/L (ref 0.4–2)
LEUKOCYTE ESTERASE UR QL STRIP.AUTO: NEGATIVE
LYMPHOCYTES # BLD: 1.6 K/UL (ref 0.5–4.6)
LYMPHOCYTES # BLD: 1.6 K/UL (ref 0.5–4.6)
LYMPHOCYTES NFR BLD: 10 % (ref 13–44)
LYMPHOCYTES NFR BLD: 16 % (ref 13–44)
MCH RBC QN AUTO: 30.2 PG (ref 26.1–32.9)
MCH RBC QN AUTO: 30.3 PG (ref 26.1–32.9)
MCHC RBC AUTO-ENTMCNC: 32.9 G/DL (ref 31.4–35)
MCHC RBC AUTO-ENTMCNC: 32.9 G/DL (ref 31.4–35)
MCV RBC AUTO: 91.7 FL (ref 79.6–97.8)
MCV RBC AUTO: 92.2 FL (ref 79.6–97.8)
MONOCYTES # BLD: 1.1 K/UL (ref 0.1–1.3)
MONOCYTES # BLD: 1.8 K/UL (ref 0.1–1.3)
MONOCYTES NFR BLD: 11 % (ref 4–12)
MONOCYTES NFR BLD: 12 % (ref 4–12)
NEUTS SEG # BLD: 11.6 K/UL (ref 1.7–8.2)
NEUTS SEG # BLD: 6.8 K/UL (ref 1.7–8.2)
NEUTS SEG NFR BLD: 71 % (ref 43–78)
NEUTS SEG NFR BLD: 77 % (ref 43–78)
NITRITE UR QL STRIP.AUTO: NEGATIVE
NRBC # BLD: 0 K/UL (ref 0–0.2)
NRBC # BLD: 0 K/UL (ref 0–0.2)
PH UR STRIP: 7.5 [PH] (ref 5–9)
PLATELET # BLD AUTO: 175 K/UL (ref 150–450)
PLATELET # BLD AUTO: 239 K/UL (ref 150–450)
PMV BLD AUTO: 10 FL (ref 9.4–12.3)
PMV BLD AUTO: 10.2 FL (ref 9.4–12.3)
POTASSIUM SERPL-SCNC: 4.2 MMOL/L (ref 3.5–5.1)
POTASSIUM SERPL-SCNC: 4.4 MMOL/L (ref 3.5–5.1)
PROCALCITONIN SERPL-MCNC: 0.05 NG/ML
PROT SERPL-MCNC: 6.8 G/DL (ref 6.3–8.2)
PROT UR STRIP-MCNC: NEGATIVE MG/DL
RBC # BLD AUTO: 3.96 M/UL (ref 4.05–5.2)
RBC # BLD AUTO: 4.21 M/UL (ref 4.05–5.2)
SODIUM SERPL-SCNC: 137 MMOL/L (ref 136–145)
SODIUM SERPL-SCNC: 140 MMOL/L (ref 136–145)
SP GR UR REFRACTOMETRY: 1.01 (ref 1–1.02)
UROBILINOGEN UR QL STRIP.AUTO: 0.2 EU/DL (ref 0.2–1)
WBC # BLD AUTO: 15.1 K/UL (ref 4.3–11.1)
WBC # BLD AUTO: 9.5 K/UL (ref 4.3–11.1)

## 2020-06-11 PROCEDURE — 93971 EXTREMITY STUDY: CPT

## 2020-06-11 PROCEDURE — 74011250637 HC RX REV CODE- 250/637: Performed by: FAMILY MEDICINE

## 2020-06-11 PROCEDURE — 74011000250 HC RX REV CODE- 250: Performed by: PSYCHIATRY & NEUROLOGY

## 2020-06-11 PROCEDURE — 65270000029 HC RM PRIVATE

## 2020-06-11 PROCEDURE — 74011250636 HC RX REV CODE- 250/636: Performed by: FAMILY MEDICINE

## 2020-06-11 PROCEDURE — 96372 THER/PROPH/DIAG INJ SC/IM: CPT

## 2020-06-11 PROCEDURE — 84145 PROCALCITONIN (PCT): CPT

## 2020-06-11 PROCEDURE — 74011250636 HC RX REV CODE- 250/636: Performed by: PSYCHIATRY & NEUROLOGY

## 2020-06-11 PROCEDURE — 74011250636 HC RX REV CODE- 250/636: Performed by: EMERGENCY MEDICINE

## 2020-06-11 PROCEDURE — 99218 HC RM OBSERVATION: CPT

## 2020-06-11 PROCEDURE — 85025 COMPLETE CBC W/AUTO DIFF WBC: CPT

## 2020-06-11 PROCEDURE — 81003 URINALYSIS AUTO W/O SCOPE: CPT

## 2020-06-11 PROCEDURE — 99284 EMERGENCY DEPT VISIT MOD MDM: CPT

## 2020-06-11 PROCEDURE — 87040 BLOOD CULTURE FOR BACTERIA: CPT

## 2020-06-11 PROCEDURE — 74011250637 HC RX REV CODE- 250/637: Performed by: EMERGENCY MEDICINE

## 2020-06-11 PROCEDURE — 96376 TX/PRO/DX INJ SAME DRUG ADON: CPT

## 2020-06-11 PROCEDURE — 36415 COLL VENOUS BLD VENIPUNCTURE: CPT

## 2020-06-11 PROCEDURE — 83605 ASSAY OF LACTIC ACID: CPT

## 2020-06-11 PROCEDURE — 80053 COMPREHEN METABOLIC PANEL: CPT

## 2020-06-11 PROCEDURE — 99231 SBSQ HOSP IP/OBS SF/LOW 25: CPT | Performed by: PSYCHIATRY & NEUROLOGY

## 2020-06-11 RX ORDER — CEPHALEXIN 500 MG/1
500 CAPSULE ORAL
Status: COMPLETED | OUTPATIENT
Start: 2020-06-11 | End: 2020-06-11

## 2020-06-11 RX ORDER — DIVALPROEX SODIUM 250 MG/1
750 TABLET, DELAYED RELEASE ORAL 2 TIMES DAILY
Qty: 180 TAB | Refills: 0 | Status: SHIPPED | OUTPATIENT
Start: 2020-06-11 | End: 2020-07-11

## 2020-06-11 RX ORDER — RIVAROXABAN 15 MG-20MG
KIT ORAL
Qty: 1 DOSE PACK | Refills: 0 | Status: SHIPPED | OUTPATIENT
Start: 2020-06-11 | End: 2022-03-25

## 2020-06-11 RX ORDER — CEPHALEXIN 500 MG/1
500 CAPSULE ORAL 4 TIMES DAILY
Qty: 28 CAP | Refills: 0 | Status: SHIPPED | OUTPATIENT
Start: 2020-06-11 | End: 2020-06-18

## 2020-06-11 RX ORDER — ONDANSETRON 4 MG/1
4 TABLET, FILM COATED ORAL
Qty: 10 TAB | Refills: 0 | Status: SHIPPED | OUTPATIENT
Start: 2020-06-11 | End: 2022-03-25

## 2020-06-11 RX ADMIN — PROCHLORPERAZINE EDISYLATE 10 MG: 5 INJECTION INTRAMUSCULAR; INTRAVENOUS at 00:10

## 2020-06-11 RX ADMIN — CEPHALEXIN 500 MG: 500 CAPSULE ORAL at 22:45

## 2020-06-11 RX ADMIN — SODIUM CHLORIDE 500 ML: 900 INJECTION, SOLUTION INTRAVENOUS at 22:16

## 2020-06-11 RX ADMIN — RIVAROXABAN 15 MG: 15 TABLET, FILM COATED ORAL at 23:40

## 2020-06-11 RX ADMIN — DIVALPROEX SODIUM 750 MG: 500 TABLET, DELAYED RELEASE ORAL at 09:31

## 2020-06-11 RX ADMIN — PROCHLORPERAZINE EDISYLATE 10 MG: 5 INJECTION INTRAMUSCULAR; INTRAVENOUS at 06:18

## 2020-06-11 RX ADMIN — DIPHENHYDRAMINE HYDROCHLORIDE 50 MG: 50 INJECTION, SOLUTION INTRAMUSCULAR; INTRAVENOUS at 12:56

## 2020-06-11 RX ADMIN — DIPHENHYDRAMINE HYDROCHLORIDE 50 MG: 50 INJECTION, SOLUTION INTRAMUSCULAR; INTRAVENOUS at 00:10

## 2020-06-11 RX ADMIN — Medication 10 ML: at 11:00

## 2020-06-11 RX ADMIN — ENOXAPARIN SODIUM 40 MG: 40 INJECTION SUBCUTANEOUS at 09:31

## 2020-06-11 RX ADMIN — Medication 10 ML: at 06:19

## 2020-06-11 RX ADMIN — DIPHENHYDRAMINE HYDROCHLORIDE 50 MG: 50 INJECTION, SOLUTION INTRAMUSCULAR; INTRAVENOUS at 06:18

## 2020-06-11 RX ADMIN — PROCHLORPERAZINE EDISYLATE 10 MG: 5 INJECTION INTRAMUSCULAR; INTRAVENOUS at 12:56

## 2020-06-11 RX ADMIN — CYCLOBENZAPRINE 10 MG: 10 TABLET, FILM COATED ORAL at 09:38

## 2020-06-11 RX ADMIN — CITALOPRAM HYDROBROMIDE 40 MG: 20 TABLET ORAL at 09:31

## 2020-06-11 RX ADMIN — KETOROLAC TROMETHAMINE 30 MG: 30 INJECTION, SOLUTION INTRAMUSCULAR at 06:18

## 2020-06-11 NOTE — PROGRESS NOTES
Problem: Falls - Risk of  Goal: *Absence of Falls  Description: Document Toni Yesenia Fall Risk and appropriate interventions in the flowsheet.   Outcome: Progressing Towards Goal  Note: Fall Risk Interventions:            Medication Interventions: Teach patient to arise slowly                   Problem: Patient Education: Go to Patient Education Activity  Goal: Patient/Family Education  Outcome: Progressing Towards Goal     Problem: Pain  Goal: *Control of Pain  Outcome: Progressing Towards Goal  Goal: *PALLIATIVE CARE:  Alleviation of Pain  Outcome: Progressing Towards Goal     Problem: Patient Education: Go to Patient Education Activity  Goal: Patient/Family Education  Outcome: Progressing Towards Goal

## 2020-06-11 NOTE — DISCHARGE SUMMARY
Discharge Summary   Patient ID:  Raisa Alexander  [id number]  66 y.o.  1990  Admit date: 6/8/2020  7:56 PM  Discharge date and time: 6/11/2020  Attending: Von Ramirez MD  PCP:  Alyssia Tucker MD  Treatment Team: Attending Provider: Von Ramirez MD; Consulting Provider: Praveena Werner DO; Utilization Review: Mary Ellen Lou; Nurse Practitioner: Lamonte Spann NP; Care Manager: Geraldine Dutta RN  Principal Diagnosis Status migrainosus   Principal Problem:    Status migrainosus (6/8/2020)    Active Problems:    Bipolar I disorder, most recent episode depressed, severe without psychotic features ([address] (3/13/2020)       * Admission Diagnoses: Status migrainosus [G43.901]  Status migrainosus [G43.901]  * Discharge Diagnoses:    Hospital Problems as of 6/11/2020 Date Reviewed: 6/4/2020          Codes Class Noted - Resolved POA    * (Principal) Status migrainosus ICD-10-CM: L07.647  ICD-9-CM: 346.20  6/8/2020 - Present Unknown        Bipolar I disorder, most recent episode depressed, severe without psychotic features [facility]) ICD-10-CM: F31.4  ICD-9-CM: 296.53  3/13/2020 - Present Yes                Hospital Course:  Ms. Peng Born is a 27 yo female with PMH of fibromyalgia, PCOS, bipolar disorder, narcolepsy, migraines who presented with c/o retro orbital HA X10 days with photophobia and phonophobia. Pt has been seen numerous times in the ED and virtual visits with her Neurologist.  CT head, LP without acute findings. MRI brain showed no acute findings. Neurology consulted. Underwent a bilateral greater and lesser occipital nerve block. HA resolved. Pt established with Neurology, Psych and PCP outpatient. Denies n/v/d, abd pain, HA, blurry vision, dizziness.       Diagnostic Study/Procedure results summary copied from within [facility] EMR:        Labs: Results:       Chemistry Recent Labs     06/11/20  0744 06/10/20  0623 06/09/20  0359   GLU 85 76 72    141 141   K 4.2 3.9 3.8    105 106   CO2 28 30 31   BUN 19 26* 18   CREA 0.73 0.64 0.69   CA 8.3 8.1* 8.0*   AGAP 6* 6* 4*      CBC w/Diff Recent Labs     06/11/20  0744 06/10/20  0623 06/09/20  0447   WBC 9.5 6.9 6.4   RBC 3.96* 3.92* 3.87*   HGB 12.0 12.3 11.7   HCT 36.5 35.9 35.9    192 157   GRANS 71 52 47   LYMPH 16 36 44   EOS 1 1 1      Cardiac Enzymes No results for input(s): CPK, CKND1, CHRIST in the last 72 hours. No lab exists for component: CKRMB, TROIP   Coagulation No results for input(s): PTP, INR, APTT, INREXT in the last 72 hours. Lipid Panel @BRIEFLAB(CHOL,CHOLPOCT,069172,625825,[id number],CHOLX,CHOLP,CHLST,CHOLV,912313,HDL,HDLPOC,HDLPOCT,701989,NHDLCT,[id number],HDLC,HDLP,LDL,LDLPOCT,LDLCPOC,655100,NLDLCT,DLDL,LDLC,DLDLP,920862,VLDLC,VLDL,TGL,TGLX,TRIGL,[id number],TRIGP,TGLPOCT,573734,282047,CHHD,CHHDX)@   BNP No results for input(s): BNPP in the last 72 hours. Liver Enzymes No results for input(s): TP, ALB, TBIL, AP in the last 72 hours. No lab exists for component: SGOT, GPT, DBIL   Thyroid Studies Lab Results   Component Value Date/Time    TSH 2.113 12/17/2009 04:10 PM            Discharge Exam:  Visit Vitals  /69 (BP 1 Location: Left arm, BP Patient Position: At rest;Sitting)   Pulse (!) 110   Temp 98.4 °F (36.9 °C)   Resp 20   Ht 5' 8\" (1.727 m)   Wt 99.8 kg (220 lb)   SpO2 95%   BMI 33.45 kg/m²       General:       No acute distress   Lungs:          CTA bilaterally. Resp even and nonlabored  Heart:            S1S2 present without murmurs rubs gallops. RRR. No LE edema  Abdomen:    Soft, non tender, non distended. BS present  Extremities: Moves ext spontaneously. No cyanosis  Neurologic:  A/O X3. PERRLA. Peripheral vision intact. EOMs intact. No nystagmus. Bilateral UE/LE strength 5/5 without drift. Sensation intact.     Disposition: home  Discharge Condition: stable  Patient Instructions:   Current Discharge Medication List      START taking these medications    Details   divalproex DR (DEPAKOTE) 250 mg tablet Take 3 Tabs by mouth two (2) times a day for 30 days. Qty: 180 Tab, Refills: 0         CONTINUE these medications which have CHANGED    Details   ondansetron hcl (ZOFRAN) 4 mg tablet Take 1 Tab by mouth every eight (8) hours as needed for Nausea or Vomiting. Qty: 10 Tab, Refills: 0         CONTINUE these medications which have NOT CHANGED    Details   traZODone (DESYREL) 150 mg tablet Take 1 Tab by mouth nightly as needed for Sleep. Qty: 10 Tab, Refills: 0      SUMAtriptan (Imitrex) 100 mg tablet Take 1 Tab by mouth two (2) times daily as needed for Migraine. Qty: 10 Tab, Refills: 0      citalopram (CELEXA) 40 mg tablet Take 40 mg by mouth daily. cyclobenzaprine (FLEXERIL) 10 mg tablet Take  by mouth three (3) times daily as needed for Muscle Spasm(s).      magnesium oxide 500 mg tab Take  by mouth. diphenhydrAMINE (BENADRYL ALLERGY) 25 mg tablet Take 1 Tab by mouth every six (6) hours as needed for Sleep. Qty: 21 Tab, Refills: 0      EPINEPHrine (EPIPEN) 0.3 mg/0.3 mL injection 0.3 mg by IntraMUSCular route once as needed. loratadine (CLARITIN) 10 mg tablet Take 10 mg by mouth daily.            STOP taking these medications       prochlorperazine (COMPAZINE) 10 mg tablet Comments:   Reason for Stopping:         butalbital-acetaminophen-caff (Fioricet) -40 mg per capsule Comments:   Reason for Stopping:         doxycycline (VIBRA-TABS) 100 mg tablet Comments:   Reason for Stopping:         butalbital-acetaminophen-caff (FIORICET) -40 mg per capsule Comments:   Reason for Stopping:         OXcarbazepine (TRILEPTAL) 150 mg tablet Comments:   Reason for Stopping:         topiramate (TOPAMAX) 100 mg tablet Comments:   Reason for Stopping:         topiramate (TOPAMAX) 50 mg tablet Comments:   Reason for Stopping:         naproxen (NAPROSYN) 500 mg tablet Comments:   Reason for Stopping:         topiramate (TOPAMAX) 100 mg tablet Comments:   Reason for Stopping: predniSONE (STERAPRED DS) 10 mg dose pack Comments:   Reason for Stopping:               Activity: Activity as tolerated  Diet: Regular Diet  Wound Care: None needed      Full Code   Surrogate decision maker:  mother  Pneumonia and flu vaccine to be administered at discharge per hospital protocol     Follow-up  ·   FU PCP 2-3 days  · FU Neurology  · [facility]  · Pt denies SI/HI      Notes, labs, VS, diagnostic testing reviewed  Case discussed with pt, care team, Dr. Wally Holt      Time spent to discharge patient 45 min   Signed:  Remberto Bustos NP  6/11/2020  12:34 PM

## 2020-06-11 NOTE — PROGRESS NOTES
Problem: Falls - Risk of  Goal: *Absence of Falls  Description: Document Janet Villanueva Fall Risk and appropriate interventions in the flowsheet.   6/11/2020 1255 by Amanda De La O RN  Outcome: Resolved/Met  Note: Fall Risk Interventions:            Medication Interventions: Teach patient to arise slowly                6/11/2020 1022 by Amanda De La O RN  Outcome: Progressing Towards Goal  Note: Fall Risk Interventions:            Medication Interventions: Teach patient to arise slowly                   Problem: Patient Education: Go to Patient Education Activity  Goal: Patient/Family Education  6/11/2020 1255 by Amanda De La O RN  Outcome: Resolved/Met  6/11/2020 1022 by Amanda De La O RN  Outcome: Progressing Towards Goal     Problem: Pain  Goal: *Control of Pain  6/11/2020 1255 by Amanda De La O RN  Outcome: Resolved/Met  6/11/2020 1022 by Amanda De La O RN  Outcome: Progressing Towards Goal  Goal: *PALLIATIVE CARE:  Alleviation of Pain  6/11/2020 1255 by Amanda De La O RN  Outcome: Resolved/Met  6/11/2020 1022 by Amanda De La O RN  Outcome: Progressing Towards Goal     Problem: Patient Education: Go to Patient Education Activity  Goal: Patient/Family Education  6/11/2020 1255 by Amanda De La O RN  Outcome: Resolved/Met  6/11/2020 1022 by Amanda De La O RN  Outcome: Progressing Towards Goal

## 2020-06-11 NOTE — DISCHARGE INSTRUCTIONS
DISCHARGE SUMMARY from Nurse    PATIENT INSTRUCTIONS:    After general anesthesia or intravenous sedation, for 24 hours or while taking prescription Narcotics:  · Limit your activities  · Do not drive and operate hazardous machinery  · Do not make important personal or business decisions  · Do  not drink alcoholic beverages  · If you have not urinated within 8 hours after discharge, please contact your surgeon on call. Report the following to your surgeon:  · Excessive pain, swelling, redness or odor of or around the surgical area  · Temperature over 100.5  · Nausea and vomiting lasting longer than 4 hours or if unable to take medications  · Any signs of decreased circulation or nerve impairment to extremity: change in color, persistent  numbness, tingling, coldness or increase pain  · Any questions    What to do at Home:  Recommended activity: Activity as tolerated. If you experience any of the following symptoms temp > 101.5, unrelieved pain, nausea or vomiting, shortness of breath or fatigue not relieved with rest, please follow up with MD.    *  Please give a list of your current medications to your Primary Care Provider. *  Please update this list whenever your medications are discontinued, doses are      changed, or new medications (including over-the-counter products) are added. *  Please carry medication information at all times in case of emergency situations. These are general instructions for a healthy lifestyle:    No smoking/ No tobacco products/ Avoid exposure to second hand smoke  Surgeon General's Warning:  Quitting smoking now greatly reduces serious risk to your health.     Obesity, smoking, and sedentary lifestyle greatly increases your risk for illness    A healthy diet, regular physical exercise & weight monitoring are important for maintaining a healthy lifestyle    You may be retaining fluid if you have a history of heart failure or if you experience any of the following symptoms:  Weight gain of 3 pounds or more overnight or 5 pounds in a week, increased swelling in our hands or feet or shortness of breath while lying flat in bed. Please call your doctor as soon as you notice any of these symptoms; do not wait until your next office visit. The discharge information has been reviewed with the patient. The patient verbalized understanding. Discharge medications reviewed with the patient and appropriate educational materials and side effects teaching were provided. Patient Education        Migraine Headache: Care Instructions  Your Care Instructions  Migraines are painful, throbbing headaches that often start on one side of the head. They may cause nausea and vomiting and make you sensitive to light, sound, or smell. Without treatment, migraines can last from 4 hours to a few days. Medicines can help prevent migraines or stop them after they have started. Your doctor can help you find which ones work best for you. Follow-up care is a key part of your treatment and safety. Be sure to make and go to all appointments, and call your doctor if you are having problems. It's also a good idea to know your test results and keep a list of the medicines you take. How can you care for yourself at home? · Do not drive if you have taken a prescription pain medicine. · Rest in a quiet, dark room until your headache is gone. Close your eyes, and try to relax or go to sleep. Don't watch TV or read. · Put a cold, moist cloth or cold pack on the painful area for 10 to 20 minutes at a time. Put a thin cloth between the cold pack and your skin. · Use a warm, moist towel or a heating pad set on low to relax tight shoulder and neck muscles. · Have someone gently massage your neck and shoulders. · Take your medicines exactly as prescribed. Call your doctor if you think you are having a problem with your medicine.  You will get more details on the specific medicines your doctor prescribes. · Don't take medicine for headache pain too often. Talk to your doctor if you are taking medicine more than 2 days a week to stop a headache. Taking too much pain medicine can lead to more headaches. These are called medicine-overuse headaches. To prevent migraines  · Keep a headache diary so you can figure out what triggers your headaches. Avoiding triggers may help you prevent headaches. Record when each headache began, how long it lasted, and what the pain was like. Write down any other symptoms you had with the headache, such as nausea, flashing lights or dark spots, or sensitivity to bright light or loud noise. Note if the headache occurred near your period. List anything that might have triggered the headache. Triggers may include certain foods (chocolate, cheese, wine) or odors, smoke, bright light, stress, or lack of sleep. · If your doctor has prescribed medicine for your migraines, take it as directed. You may have medicine that you take only when you get a migraine and medicine that you take all the time to help prevent migraines. ? If your doctor has prescribed medicine for when you get a headache, take it at the first sign of a migraine, unless your doctor has given you other instructions. ? If your doctor has prescribed medicine to prevent migraines, take it exactly as prescribed. Call your doctor if you think you are having a problem with your medicine. · Find healthy ways to deal with stress. Migraines are most common during or right after stressful times. Try finding ways to reduce stress like practicing mindfulness or deep breathing exercises. · Get plenty of sleep and exercise. But be careful to not push yourself too hard during exercise. It may trigger a headache. · Eat meals on a regular schedule. Avoid foods and drinks that often trigger migraines.  These include chocolate, alcohol (especially red wine and port), aspartame, monosodium glutamate (MSG), and some additives found in foods (such as hot dogs, sloan, cold cuts, aged cheeses, and pickled foods). · Limit caffeine. Don't drink too much coffee, tea, or soda. But don't quit caffeine suddenly. That can also give you migraines. · Do not smoke or allow others to smoke around you. If you need help quitting, talk to your doctor about stop-smoking programs and medicines. These can increase your chances of quitting for good. · If you are taking birth control pills or hormone therapy, talk to your doctor about whether they are triggering your migraines. When should you call for help? [id number] anytime you think you may need emergency care. For example, call if:  · You have signs of a stroke. These may include:  ? Sudden numbness, paralysis, or weakness in your face, arm, or leg, especially on only one side of your body. ? Sudden vision changes. ? Sudden trouble speaking. ? Sudden confusion or trouble understanding simple statements. ? Sudden problems with walking or balance. ? A sudden, severe headache that is different from past headaches. Call your doctor now or seek immediate medical care if:  · You have new or worse nausea and vomiting. · You have a new or higher fever. · Your headache gets much worse. Watch closely for changes in your health, and be sure to contact your doctor if:  · You are not getting better after 2 days (48 hours). Where can you learn more? Go to http://raji-aurea.info/  Enter T816 in the search box to learn more about \"Migraine Headache: Care Instructions. \"  Current as of: November 20, 2019               Content Version: 12.5  © 1643-7699 Healthwise, Incorporated. Care instructions adapted under license by [employer] (which disclaims liability or warranty for this information).  If you have questions about a medical condition or this instruction, always ask your healthcare professional. [place] [address] any warranty or liability for your use of this information.          ___________________________________________________________________________________________________________________________________

## 2020-06-11 NOTE — PROGRESS NOTES
Neurology Daily Progress Note     Assessment:     26year old female with status migrainosus, resolved. Plan:     I try to avoid using butalbital-containing medications. Continue Depakote as an outpatient. Consider adding Aimovig as an outpatient. The patient can follow up with Dr. Jacob Boateng as an outpatient. We will sign off. Feel free to contact us with any future concerns. Subjective: Interval history:    Patient reports headache is resolved. No issues noted. Doing well today. History:    Brandi Gutierrez is a 26 y.o. female who is being seen for migraine. Review of systems negative with exception of pertinent positives and negatives noted above.        Objective:     Vitals:    06/10/20 1958 06/11/20 0011 06/11/20 0455 06/11/20 0750   BP: 100/58 106/56 94/52 101/52   Pulse: 92 73 87 76   Resp: 16 16 16 18   Temp: 98.5 °F (36.9 °C) 98.3 °F (36.8 °C) 98.2 °F (36.8 °C) 98.2 °F (36.8 °C)   SpO2: 95% 96% 96% 96%   Weight:       Height:              Current Facility-Administered Medications:     hyoscyamine SL (LEVSIN/SL) tablet 0.125 mg, 0.125 mg, SubLINGual, Q4H PRN, Sonido ADKINS, NP, 0.125 mg at 06/10/20 1725    loperamide (IMODIUM) capsule 2 mg, 2 mg, Oral, Q4H PRN, Sonido ADKINS, NP    citalopram (CELEXA) tablet 40 mg, 40 mg, Oral, DAILY, Lamont Julio MD, 40 mg at 06/10/20 0849    cyclobenzaprine (FLEXERIL) tablet 10 mg, 10 mg, Oral, TID PRN, Lamont Julio MD, 10 mg at 06/10/20 1613    [Held by provider] topiramate (TOPAMAX) tablet 100 mg, 100 mg, Oral, BID, Lamont Julio MD    traZODone (DESYREL) tablet 150 mg, 150 mg, Oral, QHS PRN, Lamont Julio MD    sodium chloride (NS) flush 5-40 mL, 5-40 mL, IntraVENous, Q8H, Lamont Julio MD, 10 mL at 06/11/20 3550    sodium chloride (NS) flush 5-40 mL, 5-40 mL, IntraVENous, PRN, Lamont Julio MD, 10 mL at 06/10/20 1736    acetaminophen (TYLENOL) tablet 650 mg, 650 mg, Oral, Q4H PRN, Manasa Para MD MJ    enoxaparin (LOVENOX) injection 40 mg, 40 mg, SubCUTAneous, Q24H, Pavithra Carrillo MD, 40 mg at 06/10/20 0849    divalproex DR (DEPAKOTE) tablet 750 mg, 750 mg, Oral, BID, Pavithra Carrillo MD, 750 mg at 06/10/20 1726    ketorolac (TORADOL) injection 30 mg, 30 mg, IntraMUSCular, Q8H, Tommy Cain DO, 30 mg at 06/11/20 0618    diphenhydrAMINE (BENADRYL) injection 50 mg, 50 mg, IntraVENous, Q6H, Tommy Cain DO, 50 mg at 06/11/20 0618    prochlorperazine (COMPAZINE) with saline injection 10 mg, 10 mg, IntraVENous, Q6H, Tommy Cain DO, 10 mg at 06/11/20 9670    Recent Results (from the past 12 hour(s))   CBC WITH AUTOMATED DIFF    Collection Time: 06/11/20  7:44 AM   Result Value Ref Range    WBC 9.5 4.3 - 11.1 K/uL    RBC 3.96 (L) 4.05 - 5.2 M/uL    HGB 12.0 11.7 - 15.4 g/dL    HCT 36.5 35.8 - 46.3 %    MCV 92.2 79.6 - 97.8 FL    MCH 30.3 26.1 - 32.9 PG    MCHC 32.9 31.4 - 35.0 g/dL    RDW 12.3 11.9 - 14.6 %    PLATELET 025 343 - 496 K/uL    MPV 10.0 9.4 - 12.3 FL    ABSOLUTE NRBC 0.00 0.0 - 0.2 K/uL    DF AUTOMATED      NEUTROPHILS 71 43 - 78 %    LYMPHOCYTES 16 13 - 44 %    MONOCYTES 11 4.0 - 12.0 %    EOSINOPHILS 1 0.5 - 7.8 %    BASOPHILS 0 0.0 - 2.0 %    IMMATURE GRANULOCYTES 1 0.0 - 5.0 %    ABS. NEUTROPHILS 6.8 1.7 - 8.2 K/UL    ABS. LYMPHOCYTES 1.6 0.5 - 4.6 K/UL    ABS. MONOCYTES 1.1 0.1 - 1.3 K/UL    ABS. EOSINOPHILS 0.1 0.0 - 0.8 K/UL    ABS. BASOPHILS 0.0 0.0 - 0.2 K/UL    ABS. IMM.  GRANS. 0.1 0.0 - 0.5 K/UL   METABOLIC PANEL, BASIC    Collection Time: 06/11/20  7:44 AM   Result Value Ref Range    Sodium 140 136 - 145 mmol/L    Potassium 4.2 3.5 - 5.1 mmol/L    Chloride 106 98 - 107 mmol/L    CO2 28 21 - 32 mmol/L    Anion gap 6 (L) 7 - 16 mmol/L    Glucose 85 65 - 100 mg/dL    BUN 19 6 - 23 MG/DL    Creatinine 0.73 0.6 - 1.0 MG/DL    GFR est AA >60 >60 ml/min/1.73m2    GFR est non-AA >60 >60 ml/min/1.73m2    Calcium 8.3 8.3 - 10.4 MG/DL         Physical Exam:  General - Well developed, well nourished, in no apparent distress. Pleasant and conversant. Wearing sunglasses in the dark   HEENT - Normocephalic, atraumatic. Neck -No masses   Psychiatric - Mood and affect are normal     Neurological examination - Comprehension, attention , memory and reasoning are intact. Language and speech are normal. On cranial nerve examination pupils are equal round and reactive to light (cranial nerve II and III). Visual acuity is adequate (cranial nerve II). Visual fields are full to finger confrontation (CN II). Extraocular motility is normal (CN III, IV, VI). Face is symmetric (CN VII). Hearing is grossly intact to verbal communication (CN VIII). Motor examination - There is normal bulk. Power is full throughout.        Signed By: North Gomez NP     June 11, 2020

## 2020-06-11 NOTE — PROGRESS NOTES
Pt. Medically ready for dc home today per NP CHRIS Marquez, no dc needs at this time. Care Management Interventions  PCP Verified by CM: Yes([facility])  Mode of Transport at Discharge: Other (see comment)(family to transport)  Transition of Care Consult (CM Consult): Discharge Planning  Current Support Network:  Other(Down  stairs apartment in grandparents home)  Confirm Follow Up Transport: Family  Freedom of Choice List was Provided with Basic Dialogue that Supports the Patient's Individualized Plan of Care/Goals, Treatment Preferences and Shares the Quality Data Associated with the Providers?: Yes  Discharge Location  Discharge Placement: Home

## 2020-06-11 NOTE — ROUTINE PROCESS
[address] June 11, 2020       RE: Jennie Paulino      To Whom It May Concern,    This is to certify that Jennie Paulino was hospitalized from 6/8/2020-6/11/2020. Please feel free to contact my office if you have any questions or concerns. Thank you for your assistance in this matter.       Sincerely,  Hung Rodriguez RN   [phone]

## 2020-06-11 NOTE — LETTER
[address] [facility] EMERGENCY DEPT 
[address] [facility] 
[phone] Work/School Note Date: 6/11/2020 To Whom It May concern: 
 
Aruna Henry was seen and treated today in the emergency room by the following provider(s): 
Attending Provider: Terrance Patrick 78 may return to work on 6/13/20. Sincerely, Yuri Lema

## 2020-06-11 NOTE — PROGRESS NOTES
Problem: Falls - Risk of  Goal: *Absence of Falls  Description: Document Karen Alba Fall Risk and appropriate interventions in the flowsheet.   Outcome: Progressing Towards Goal  Note: Fall Risk Interventions:            Medication Interventions: Teach patient to arise slowly

## 2020-06-11 NOTE — ROUTINE PROCESS
Discharge instructions, follow up information, medication list, and prescriptions provided and explained to the pt. IV removed from right [facility] no remote telemetry on. Opportunity for questions provided. Instructed to call once ready to leave.

## 2020-06-12 ENCOUNTER — HOSPITAL ENCOUNTER (EMERGENCY)
Age: 30
Discharge: HOME | End: 2020-06-12
Attending: EMERGENCY MEDICINE
Payer: SELF-PAY

## 2020-06-12 VITALS
TEMPERATURE: 98.7 F | HEIGHT: 68 IN | BODY MASS INDEX: 33.34 KG/M2 | OXYGEN SATURATION: 97 % | HEART RATE: 99 BPM | WEIGHT: 220 LBS | SYSTOLIC BLOOD PRESSURE: 111 MMHG | RESPIRATION RATE: 16 BRPM | DIASTOLIC BLOOD PRESSURE: 56 MMHG

## 2020-06-12 DIAGNOSIS — I82.621 ACUTE DEEP VEIN THROMBOSIS (DVT) OF RIGHT UPPER EXTREMITY, UNSPECIFIED VEIN (HCC): Primary | ICD-10-CM

## 2020-06-12 PROCEDURE — 99283 EMERGENCY DEPT VISIT LOW MDM: CPT

## 2020-06-12 NOTE — PROGRESS NOTES
Patient called stating she is unable to afford Xarelto / uninsured at this time. Left a Xarelto (Free 30 day trial) card in registration for patient to . She states she is following up with her PCP next week.

## 2020-06-12 NOTE — ED TRIAGE NOTES
Arrives with face  Mask in place. Reports right arm pain, redness and swelling. Seen here yesterday for same. Discharged from hospital yesterday after admission for migraines. IV to right AC during admission. Symptoms developed after discharge. Diagnosed with DVT and thrombophlebitis yesterday. Placed on blood thinners and abx. Reports increased swelling and extending redness down to right wrist. Also reports rash behind left ear.

## 2020-06-12 NOTE — ED TRIAGE NOTES
Arrives with face mask in place. Reports right arm pain, redness and swelling to right AC. Reports discharged approx 3-4 hours pta after admission for migraines. States after discharge site became red with swelling. Red streaking noted going up arm. Reports chills after discharge.

## 2020-06-12 NOTE — ED PROVIDER NOTES
Patient released from this hospital earlier today after admission for status migrainosus. IV reason removed from right arm but she is developed increased pain swelling and redness in the antecubital fossa. She denies any chest pain or trouble breathing. Patient noted to be febrile. The history is provided by the patient. Arm Pain    This is a new problem. The current episode started 6 to 12 hours ago. The problem occurs constantly. The problem has been gradually worsening. The pain is present in the right arm. The quality of the pain is described as aching. The pain is moderate. Pertinent negatives include no numbness, no tingling, no back pain and no neck pain. The symptoms are aggravated by movement and contact. She has tried nothing for the symptoms. There has been no history of extremity trauma.         Past Medical History:   Diagnosis Date    Neurological disorder     Other ill-defined conditions(799.89)     narcalepsy    Other ill-defined conditions(799.89)     fibromyalgia    Other ill-defined conditions(799.89)     PCOS (polycystic ovarian syndrome)        Past Surgical History:   Procedure Laterality Date    HX CHOLECYSTECTOMY      HX WISDOM TEETH EXTRACTION           Family History:   Problem Relation Age of Onset    Diabetes Father     Hypertension Father     Breast Cancer Neg Hx     Ovarian Cancer Neg Hx     Colon Cancer Neg Hx        Social History     Socioeconomic History    Marital status: SINGLE     Spouse name: Not on file    Number of children: Not on file    Years of education: Not on file    Highest education level: Not on file   Occupational History    Not on file   Social Needs    Financial resource strain: Not on file    Food insecurity     Worry: Not on file     Inability: Not on file    Transportation needs     Medical: Not on file     Non-medical: Not on file   Tobacco Use    Smoking status: Never Smoker    Smokeless tobacco: Never Used   Substance and Sexual Activity    Alcohol use: No    Drug use: No    Sexual activity: Not Currently   Lifestyle    Physical activity     Days per week: Not on file     Minutes per session: Not on file    Stress: Not on file   Relationships    Social connections     Talks on phone: Not on file     Gets together: Not on file     Attends Voodoo service: Not on file     Active member of club or organization: Not on file     Attends meetings of clubs or organizations: Not on file     Relationship status: Not on file    Intimate partner violence     Fear of current or ex partner: Not on file     Emotionally abused: Not on file     Physically abused: Not on file     Forced sexual activity: Not on file   Other Topics Concern    Not on file   Social History Narrative    Not on file         ALLERGIES: Latex, natural rubber; Amoxicillin; Doxycycline; Epinephrine; Gluten; Lyrica [pregabalin]; Neosporin [benzalkonium chloride]; Penicillins; Sulfa (sulfonamide antibiotics); and Zithromax [azithromycin]    Review of Systems   Constitutional: Positive for fever. Negative for chills. HENT: Negative for congestion and rhinorrhea. Respiratory: Negative for cough and shortness of breath. Cardiovascular: Negative for chest pain. Gastrointestinal: Negative for nausea and vomiting. Genitourinary: Negative for dysuria, frequency, hematuria and urgency. Musculoskeletal: Negative for back pain, myalgias and neck pain. Neurological: Positive for headaches. Negative for tingling, weakness and numbness. Vitals:    06/11/20 2008   BP: 110/74   Pulse: (!) 121   Resp: 18   Temp: (!) 100.5 °F (38.1 °C)   SpO2: 96%   Weight: 99.8 kg (220 lb)   Height: 5' 8\" (1.727 m)            Physical Exam  Vitals signs and nursing note reviewed. Constitutional:       General: She is not in acute distress. Appearance: She is well-developed. HENT:      Head: Normocephalic. Eyes:      Pupils: Pupils are equal, round, and reactive to light. Cardiovascular:      Rate and Rhythm: Normal rate and regular rhythm. Heart sounds: Normal heart sounds. Pulmonary:      Effort: Pulmonary effort is normal.      Breath sounds: Normal breath sounds. Abdominal:      General: There is no distension. Palpations: Abdomen is soft. There is no mass. Tenderness: There is no abdominal tenderness. There is no guarding or rebound. Musculoskeletal: Normal range of motion. Comments: Neurovascularly intact right upper extremity. Lymphadenopathy:      Cervical: No cervical adenopathy. Skin:     General: Skin is warm and dry. Findings: Erythema ( Warmth and erythema in the right antecubital fossa extending into the proximal forearm and distal upper arm. Mild warmth, no abscess or purulent drainage. Neurovascularly intact) present. Neurological:      Mental Status: She is alert. MDM  Number of Diagnoses or Management Options  Diagnosis management comments: Infected thrombophlebitis with possible DVT. Awaiting ultrasound results. Preliminary positive for DVT. Treat with antibiotics due to fever. No chest pain or trouble breathing to suggest PE. Start anticoagulation. No infection in urine.        Amount and/or Complexity of Data Reviewed  Clinical lab tests: ordered and reviewed (Results for orders placed or performed during the hospital encounter of 06/11/20  -CBC WITH AUTOMATED DIFF       Result                      Value             Ref Range           WBC                         15.1 (H)          4.3 - 11.1 K*       RBC                         4.21              4.05 - 5.2 M*       HGB                         12.7              11.7 - 15.4 *       HCT                         38.6              35.8 - 46.3 %       MCV                         91.7              79.6 - 97.8 *       MCH                         30.2              26.1 - 32.9 *       MCHC                        32.9              31.4 - 35.0 *       RDW 12.3              11.9 - 14.6 %       PLATELET                    239               150 - 450 K/*       MPV                         10.2              9.4 - 12.3 FL       ABSOLUTE NRBC               0.00              0.0 - 0.2 K/*       DF                          AUTOMATED                             NEUTROPHILS                 77                43 - 78 %           LYMPHOCYTES                 10 (L)            13 - 44 %           MONOCYTES                   12                4.0 - 12.0 %        EOSINOPHILS                 0 (L)             0.5 - 7.8 %         BASOPHILS                   0                 0.0 - 2.0 %         IMMATURE GRANULOCYTES       1                 0.0 - 5.0 %         ABS. NEUTROPHILS            11.6 (H)          1.7 - 8.2 K/*       ABS. LYMPHOCYTES            1.6               0.5 - 4.6 K/*       ABS. MONOCYTES              1.8 (H)           0.1 - 1.3 K/*       ABS. EOSINOPHILS            0.0               0.0 - 0.8 K/*       ABS. BASOPHILS              0.0               0.0 - 0.2 K/*       ABS. IMM.  GRANS.            0.1               0.0 - 0.5 K/*  -METABOLIC PANEL, COMPREHENSIVE       Result                      Value             Ref Range           Sodium                      137               136 - 145 mm*       Potassium                   4.4               3.5 - 5.1 mm*       Chloride                    102               98 - 107 mmo*       CO2                         28                21 - 32 mmol*       Anion gap                   7                 7 - 16 mmol/L       Glucose                     94                65 - 100 mg/*       BUN                         16                6 - 23 MG/DL        Creatinine                  0.86              0.6 - 1.0 MG*       GFR est AA                  >60               >60 ml/min/1*       GFR est non-AA              >60               >60 ml/min/1*       Calcium                     9.3               8.3 - 10.4 M*       Bilirubin, total 0.3               0.2 - 1.1 MG*       ALT (SGPT)                  30                12 - 65 U/L         AST (SGOT)                  18                15 - 37 U/L         Alk. phosphatase            56                50 - 136 U/L        Protein, total              6.8               6.3 - 8.2 g/*       Albumin                     3.4 (L)           3.5 - 5.0 g/*       Globulin                    3.4               2.3 - 3.5 g/*       A-G Ratio                   1.0 (L)           1.2 - 3.5      -LACTIC ACID       Result                      Value             Ref Range           Lactic acid                 1.1               0.4 - 2.0 MM*  -URINALYSIS W/ RFLX MICROSCOPIC       Result                      Value             Ref Range           Color                       YELLOW                                Appearance                  CLEAR                                 Specific gravity            1.006             1.001 - 1.02*       pH (UA)                     7.5               5.0 - 9.0           Protein                     Negative          NEG mg/dL           Glucose                     Negative          mg/dL               Ketone                      Negative          NEG mg/dL           Bilirubin                   Negative          NEG                 Blood                       Negative          NEG                 Urobilinogen                0.2               0.2 - 1.0 EU*       Nitrites                    Negative          NEG                 Leukocyte Esterase          Negative          NEG            )  Tests in the radiology section of CPT®: ordered and reviewed (Duplex Upper Ext Venous Right    Result Date: 6/11/2020  RIGHT UPPER EXTREMITY DEEP VENOUS SONOGRAPHY:  June 11, 2020:  CLINICAL HISTORY:  Right arm pain, swelling, and erythema.   FINDINGS:  Without prior study for comparison, ultrasound images of the right arm demonstrate occlusive thrombus in the basilic vein from the antecubital fossa through the mid humerus. There is also occlusive thrombus extensively in the cephalic vein. Expected venous flow and compressibility are demonstrated in the ulnar, radial, brachial, axillary, subclavian, and innominate veins, as well as in the distal internal jugular vein. No intraluminal echogenic material was seen to suggest nonocclusive thrombus. IMPRESSION: POSITIVE OCCLUSIVE BASILIC AND CEPHALIC VEIN THROMBOSIS.  Preliminary results were reported by the technologist to Dr. Erika Wiggins in the ER at 2215 hours.    )           Procedures

## 2020-06-12 NOTE — DISCHARGE INSTRUCTIONS
Patient Education      Access prescribed starting tomorrow morning. Xarelto as prescribed starting again tomorrow morning. Xarelto 15 mg twice daily for 21 days then 20 mg once daily. Call your primary care doctor tomorrow for follow-up appointment early this coming week for recheck of her right arm especially if not improving. Return if any new, worsening or concerning symptoms. Deep Vein Thrombosis: Care Instructions  Overview     A deep vein thrombosis (DVT) is a blood clot in certain veins, usually in the legs, pelvis, or arms. Blood clots in these veins need to be treated because they can get bigger, break loose, and travel through the bloodstream to the lungs. A blood clot in a lung can be life-threatening. The doctor may have given you a blood thinner (anticoagulant). A blood thinner can stop the blood clot from growing larger and prevent new clots from forming. You will need to take a blood thinner for 3 to 6 months or longer. The doctor has checked you carefully, but problems can develop later. If you notice any problems or new symptoms, get medical treatment right away. Follow-up care is a key part of your treatment and safety. Be sure to make and go to all appointments, and call your doctor if you are having problems. It's also a good idea to know your test results and keep a list of the medicines you take. How can you care for yourself at home? · Take your medicines exactly as prescribed. Call your doctor if you think you are having a problem with your medicine. · If you are taking a blood thinner, be sure you get instructions about how to take your medicine safely. Blood thinners can cause serious bleeding problems. · Wear compression stockings if your doctor recommends them. These stockings are tighter at the feet than on the legs. They may reduce pain and swelling in your legs. But there are different types of stockings, and they need to fit right.  So your doctor will recommend what you need.  · When you sit, use a pillow to raise the arm or leg that has the blood clot. Try to keep it above the level of your heart. When should you call for help? [id number] anytime you think you may need emergency care. For example, call if:  · You passed out (lost consciousness). · You have symptoms of a blood clot in your lung (called a pulmonary embolism). These include:  ? Sudden chest pain. ? Trouble breathing. ? Coughing up blood. Call your doctor now or seek immediate medical care if:  · You have new or worse trouble breathing. · You are dizzy or lightheaded, or you feel like you may faint. · You have symptoms of a blood clot in your arm or leg. These may include:  ? Pain in the arm, calf, back of the knee, thigh, or groin. ? Redness and swelling in the arm, leg, or groin. Watch closely for changes in your health, and be sure to contact your doctor if:  · You do not get better as expected. Where can you learn more? Go to http://raji-aurea.info/  Enter S755 in the search box to learn more about \"Deep Vein Thrombosis: Care Instructions. \"  Current as of: March 4, 2020               Content Version: 12.5  © 2006-2020 Healthwise, Incorporated. Care instructions adapted under license by [employer] (which disclaims liability or warranty for this information). If you have questions about a medical condition or this instruction, always ask your healthcare professional. [place] [address] any warranty or liability for your use of this information.

## 2020-06-12 NOTE — ED NOTES
I have reviewed discharge instructions with the patient and parent. The patient and parent verbalized understanding. Patient left ED via Discharge Method: ambulatory to Home with parent. Opportunity for questions and clarification provided. Patient given 2 scripts. No esign        To continue your aftercare when you leave the hospital, you may receive an automated call from our care team to check in on how you are doing. This is a free service and part of our promise to provide the best care and service to meet your aftercare needs.  If you have questions, or wish to unsubscribe from this service please call [phone]. Thank you for Choosing our [facility] Emergency Department.

## 2020-06-13 LAB
BACTERIA SPEC CULT: NORMAL
GRAM STN SPEC: NORMAL
GRAM STN SPEC: NORMAL
SERVICE CMNT-IMP: 3

## 2020-06-13 NOTE — ED PROVIDER NOTES
Presents with increased redness to right arm after being diagnosed with a DVT yesterday. DVT occurred secondary to an [facility] IV. Pain is increased and redness is increased. Provider who saw patient yesterday states only mild increase in redness. Patient has been not moving right arm much. She also complains of a rash behind her left ear that is now gone. The history is provided by the patient and a parent. Arm Pain    This is a new problem. The problem occurs constantly. The problem has not changed since onset. The pain is present in the right arm. The quality of the pain is described as pounding and aching. The pain is moderate. Associated symptoms include stiffness.         Past Medical History:   Diagnosis Date    Neurological disorder     Other ill-defined conditions(799.89)     narcalepsy    Other ill-defined conditions(799.89)     fibromyalgia    Other ill-defined conditions(799.89)     PCOS (polycystic ovarian syndrome)        Past Surgical History:   Procedure Laterality Date    HX CHOLECYSTECTOMY      HX WISDOM TEETH EXTRACTION           Family History:   Problem Relation Age of Onset    Diabetes Father     Hypertension Father     Breast Cancer Neg Hx     Ovarian Cancer Neg Hx     Colon Cancer Neg Hx        Social History     Socioeconomic History    Marital status: SINGLE     Spouse name: Not on file    Number of children: Not on file    Years of education: Not on file    Highest education level: Not on file   Occupational History    Not on file   Social Needs    Financial resource strain: Not on file    Food insecurity     Worry: Not on file     Inability: Not on file    Transportation needs     Medical: Not on file     Non-medical: Not on file   Tobacco Use    Smoking status: Never Smoker    Smokeless tobacco: Never Used   Substance and Sexual Activity    Alcohol use: No    Drug use: No    Sexual activity: Not Currently   Lifestyle    Physical activity     Days per week: Not on file     Minutes per session: Not on file    Stress: Not on file   Relationships    Social connections     Talks on phone: Not on file     Gets together: Not on file     Attends [employer] service: Not on file     Active member of club or organization: Not on file     Attends meetings of clubs or organizations: Not on file     Relationship status: Not on file    Intimate partner violence     Fear of current or ex partner: Not on file     Emotionally abused: Not on file     Physically abused: Not on file     Forced sexual activity: Not on file   Other Topics Concern    Not on file   Social History Narrative    Not on file         ALLERGIES: Latex, natural rubber; Amoxicillin; Doxycycline; Epinephrine; Gluten; Lyrica [pregabalin]; Neosporin [benzalkonium chloride]; Penicillins; Sulfa (sulfonamide antibiotics); and Zithromax [azithromycin]    Review of Systems   Musculoskeletal: Positive for stiffness. All other systems reviewed and are negative. Vitals:    06/12/20 1955   BP: 111/72   Pulse: (!) 102   Resp: 16   Temp: 98.1 °F (36.7 °C)   SpO2: 97%   Weight: 99.8 kg (220 lb)   Height: 5' 8\" (1.727 m)            Physical Exam  Vitals signs and nursing note reviewed. Constitutional:       General: She is not in acute distress. Appearance: She is well-developed. She is not diaphoretic. HENT:      Head: Normocephalic and atraumatic. Eyes:      General:         Right eye: No discharge. Left eye: No discharge. Conjunctiva/sclera: Conjunctivae normal.   Neck:      Musculoskeletal: Normal range of motion and neck supple. Cardiovascular:      Rate and Rhythm: Normal rate and regular rhythm. Pulmonary:      Effort: Pulmonary effort is normal. No respiratory distress. Breath sounds: Normal breath sounds. Abdominal:      General: There is no distension. Palpations: Abdomen is soft. Tenderness: There is no abdominal tenderness. Musculoskeletal: Normal range of motion. General: Swelling and tenderness present. Comments: Right upper extremity swelling and redness over AC joint and extended up into biceps area and forearm. Patient is guarding it and not wanting to move it. No cellulitic change mild erythema   Skin:     General: Skin is warm and dry. Capillary Refill: Capillary refill takes less than 2 seconds. Findings: No rash (No significant rash noted). Neurological:      Mental Status: She is alert and oriented to person, place, and time. Cranial Nerves: No cranial nerve deficit. Psychiatric:         Behavior: Behavior normal.          MDM  Number of Diagnoses or Management Options  Acute deep vein thrombosis (DVT) of right upper extremity, unspecified vein [facility]):   Diagnosis management comments: Patient and mom advised that it will take several days for there to be improvement in swelling and redness. Return for fever. Continue current medications. Need to do exercises with arm and ice and elevate above the level of heart.     Risk of Complications, Morbidity, and/or Mortality  Presenting problems: low  Diagnostic procedures: low  Management options: low    Patient Progress  Patient progress: improved         Procedures

## 2020-06-13 NOTE — ED NOTES
I have reviewed discharge instructions with the patient. The patient verbalized understanding. Patient left ED via Discharge Method: ambulatory to Home with mother. Opportunity for questions and clarification provided. Patient given 0 scripts. To continue your aftercare when you leave the hospital, you may receive an automated call from our care team to check in on how you are doing. This is a free service and part of our promise to provide the best care and service to meet your aftercare needs.  If you have questions, or wish to unsubscribe from this service please call [phone]. Thank you for Choosing our [facility] Emergency Department.

## 2020-06-13 NOTE — DISCHARGE INSTRUCTIONS
Patient Education        Learning About Deep Vein Thrombosis  What is a deep vein thrombosis (DVT)? A deep vein thrombosis (DVT) is a blood clot (thrombus) in a deep vein, usually in the legs. A DVT can be dangerous because it can break loose and travel through the bloodstream to the lungs. There it can block blood flow in the lungs (pulmonary embolism). This can be life-threatening. What are the symptoms? Deep vein thrombosis often doesn't cause symptoms. Or it may cause only minor ones. When symptoms happen, they include:  · Swelling in the affected area. · Redness and warmth in the affected area. · Pain or tenderness. You may have pain only when you touch the affected area or when you stand or walk. Sometimes a pulmonary embolism is the first sign that you have DVT. If your doctor thinks you may have DVT, you will probably have an ultrasound test. You may have other tests as well. What causes deep vein thrombosis (DVT)? Causes of a blood clot in a deep vein (DVT) include:  · Slowed blood flow. This can happen when you're not active for long periods of time. For example, clots can form if you are paralyzed, are confined to bed, or must sit while on a long flight or car trip. · Abnormal clotting problems that make the blood clot too easily or too quickly. For example, some people have blood that clots too easily, a problem that may run in families. · Surgery or an injury to the blood vessels. Blood is more likely to clot in veins shortly after they are injured. · Cancer. How can you prevent DVT? · Exercise your lower leg muscles to help blood flow in your legs. Point your toes up toward your head so the calves of your legs are stretched, then relax and repeat. This is a good exercise to do when you are sitting for long periods of time. · Get out of bed as soon as you can after an illness or surgery. If you need to stay in bed, do the leg exercise noted above every hour when you are awake.   · Use special stockings called compression stockings. These stockings are tight at the feet with a gradually looser fit on the leg. Many doctors recommend that you wear compression stockings during a journey longer than 8 hours. · Take breaks when you are on long trips. Stop the car and walk around. On long airplane flights, walk up and down the aisle hourly, and flex and point your feet every 20 minutes while sitting. · Take blood-thinning medicines after some types of surgery if your doctor recommends it. Blood thinners also may be used if you are likely to develop clots. How is DVT treated? Treatment for DVT usually involves taking blood thinners. These medicines are given through a vein (intravenously, or IV) or as a pill. Talk with your doctor about which medicine is right for you. Your doctor also may suggest that you prop up or elevate your leg when possible, take walks, and wear compression stockings. These measures may help reduce the pain and swelling that can happen with DVT. Follow-up care is a key part of your treatment and safety. Be sure to make and go to all appointments, and call your doctor if you are having problems. It's also a good idea to know your test results and keep a list of the medicines you take. Where can you learn more? Go to http://raji-aurea.info/  Enter X941 in the search box to learn more about \"Learning About Deep Vein Thrombosis. \"  Current as of: March 4, 2020               Content Version: 12.5  © 4110-6392 Healthwise, Incorporated. Care instructions adapted under license by [employer] (which disclaims liability or warranty for this information). If you have questions about a medical condition or this instruction, always ask your healthcare professional. [place] [address] any warranty or liability for your use of this information.

## 2020-06-15 ENCOUNTER — HOSPITAL ENCOUNTER (EMERGENCY)
Age: 30
Discharge: HOME | End: 2020-06-16
Attending: EMERGENCY MEDICINE
Payer: SELF-PAY

## 2020-06-15 DIAGNOSIS — T78.40XA ALLERGIC REACTION, INITIAL ENCOUNTER: Primary | ICD-10-CM

## 2020-06-15 LAB
ALBUMIN SERPL-MCNC: 3.1 G/DL (ref 3.5–5)
ALBUMIN/GLOB SERPL: 0.8 {RATIO} (ref 1.2–3.5)
ALP SERPL-CCNC: 51 U/L (ref 50–136)
ALT SERPL-CCNC: 54 U/L (ref 12–65)
ANION GAP SERPL CALC-SCNC: 6 MMOL/L (ref 7–16)
AST SERPL-CCNC: 22 U/L (ref 15–37)
BASOPHILS # BLD: 0 K/UL (ref 0–0.2)
BASOPHILS NFR BLD: 0 % (ref 0–2)
BILIRUB SERPL-MCNC: <0.1 MG/DL (ref 0.2–1.1)
BUN SERPL-MCNC: 13 MG/DL (ref 6–23)
CALCIUM SERPL-MCNC: 9.1 MG/DL (ref 8.3–10.4)
CHLORIDE SERPL-SCNC: 104 MMOL/L (ref 98–107)
CO2 SERPL-SCNC: 31 MMOL/L (ref 21–32)
CREAT SERPL-MCNC: 0.76 MG/DL (ref 0.6–1)
DIFFERENTIAL METHOD BLD: ABNORMAL
EOSINOPHIL # BLD: 0.1 K/UL (ref 0–0.8)
EOSINOPHIL NFR BLD: 1 % (ref 0.5–7.8)
ERYTHROCYTE [DISTWIDTH] IN BLOOD BY AUTOMATED COUNT: 12.3 % (ref 11.9–14.6)
GLOBULIN SER CALC-MCNC: 4 G/DL (ref 2.3–3.5)
GLUCOSE SERPL-MCNC: 76 MG/DL (ref 65–100)
HCT VFR BLD AUTO: 36 % (ref 35.8–46.3)
HGB BLD-MCNC: 11.5 G/DL (ref 11.7–15.4)
IMM GRANULOCYTES # BLD AUTO: 0.1 K/UL (ref 0–0.5)
IMM GRANULOCYTES NFR BLD AUTO: 1 % (ref 0–5)
INR PPP: 1
LYMPHOCYTES # BLD: 1.9 K/UL (ref 0.5–4.6)
LYMPHOCYTES NFR BLD: 28 % (ref 13–44)
MCH RBC QN AUTO: 30 PG (ref 26.1–32.9)
MCHC RBC AUTO-ENTMCNC: 31.9 G/DL (ref 31.4–35)
MCV RBC AUTO: 94 FL (ref 79.6–97.8)
MONOCYTES # BLD: 0.7 K/UL (ref 0.1–1.3)
MONOCYTES NFR BLD: 10 % (ref 4–12)
NEUTS SEG # BLD: 4 K/UL (ref 1.7–8.2)
NEUTS SEG NFR BLD: 59 % (ref 43–78)
NRBC # BLD: 0 K/UL (ref 0–0.2)
PLATELET # BLD AUTO: 247 K/UL (ref 150–450)
PMV BLD AUTO: 10 FL (ref 9.4–12.3)
POTASSIUM SERPL-SCNC: 3.9 MMOL/L (ref 3.5–5.1)
PROT SERPL-MCNC: 7.1 G/DL (ref 6.3–8.2)
PROTHROMBIN TIME: 13.1 SEC (ref 12–14.7)
RBC # BLD AUTO: 3.83 M/UL (ref 4.05–5.2)
SODIUM SERPL-SCNC: 141 MMOL/L (ref 136–145)
WBC # BLD AUTO: 6.7 K/UL (ref 4.3–11.1)

## 2020-06-15 PROCEDURE — 85610 PROTHROMBIN TIME: CPT

## 2020-06-15 PROCEDURE — 99284 EMERGENCY DEPT VISIT MOD MDM: CPT

## 2020-06-15 PROCEDURE — 80053 COMPREHEN METABOLIC PANEL: CPT

## 2020-06-15 PROCEDURE — 74011636637 HC RX REV CODE- 636/637: Performed by: EMERGENCY MEDICINE

## 2020-06-15 PROCEDURE — 85025 COMPLETE CBC W/AUTO DIFF WBC: CPT

## 2020-06-15 RX ORDER — PREDNISONE 20 MG/1
40 TABLET ORAL DAILY
Qty: 8 TAB | Refills: 0 | Status: SHIPPED | OUTPATIENT
Start: 2020-06-15 | End: 2020-06-19

## 2020-06-15 RX ADMIN — PREDNISONE 60 MG: 10 TABLET ORAL at 22:29

## 2020-06-16 VITALS
SYSTOLIC BLOOD PRESSURE: 103 MMHG | OXYGEN SATURATION: 98 % | TEMPERATURE: 97.9 F | HEART RATE: 90 BPM | DIASTOLIC BLOOD PRESSURE: 56 MMHG | BODY MASS INDEX: 37.25 KG/M2 | RESPIRATION RATE: 18 BRPM | WEIGHT: 245 LBS

## 2020-06-16 LAB
BACTERIA SPEC CULT: NORMAL
SERVICE CMNT-IMP: NORMAL

## 2020-06-16 NOTE — ED NOTES
I have reviewed discharge instructions with the patient. The patient verbalized understanding. Patient left ED via Discharge Method: ambulatory to Home . Opportunity for questions and clarification provided. Patient given 0 scripts. To continue your aftercare when you leave the hospital, you may receive an automated call from our care team to check in on how you are doing. This is a free service and part of our promise to provide the best care and service to meet your aftercare needs.  If you have questions, or wish to unsubscribe from this service please call [phone]. Thank you for Choosing our [facility] Emergency Department.

## 2020-06-16 NOTE — ED TRIAGE NOTES
Pt comes in c/o having in allergic reaction that started today around 7pm .Stated she was placed on Clindamycin for an infection to the R arm and a clot \"the patient stated after she took her first dose she felt short of breath, coughing, throat swelling up, a rash to the upper body. Pt stated she took 400 mg of Benadryl 2 hours ago, Pepcid and used her albuterol inhaler with relief of symptoms. Pt denied any lip swelling, throat closing up , CP . Also stated she had some diarrhea. Pt currently taking Xarelto for the blood clot in her arm. Hives noted on both arms.

## 2020-06-16 NOTE — DISCHARGE INSTRUCTIONS
Follow-up with your doctor if your symptoms have not improved in 2 to 3 days. Stop taking your clindamycin as you are allergic to it. Return to the emergency department if your symptoms worsen.

## 2020-06-16 NOTE — ED PROVIDER NOTES
She is allergic to multiple antibiotics. She has frequent allergic reactions for which her doctor has prescribed her albuterol. She states that she started clindamycin at 11 AM today secondary to a skin infection. Later in the afternoon around 5 PM, she developed a red rash across her trunk with pruritus and some shortness of breath. She took Benadryl and Pepcid at home and used her albuterol inhaler. With her redness though she still has pruritus and some slight shortness of breath. She got concerned about the persistence of her symptoms and came here for evaluation.            Past Medical History:   Diagnosis Date    Neurological disorder     Other ill-defined conditions(799.89)     narcalepsy    Other ill-defined conditions(799.89)     fibromyalgia    Other ill-defined conditions(799.89)     PCOS (polycystic ovarian syndrome)        Past Surgical History:   Procedure Laterality Date    HX CHOLECYSTECTOMY      HX WISDOM TEETH EXTRACTION           Family History:   Problem Relation Age of Onset    Diabetes Father     Hypertension Father     Breast Cancer Neg Hx     Ovarian Cancer Neg Hx     Colon Cancer Neg Hx        Social History     Socioeconomic History    Marital status: SINGLE     Spouse name: Not on file    Number of children: Not on file    Years of education: Not on file    Highest education level: Not on file   Occupational History    Not on file   Social Needs    Financial resource strain: Not on file    Food insecurity     Worry: Not on file     Inability: Not on file    Transportation needs     Medical: Not on file     Non-medical: Not on file   Tobacco Use    Smoking status: Never Smoker    Smokeless tobacco: Never Used   Substance and Sexual Activity    Alcohol use: No    Drug use: No    Sexual activity: Not Currently   Lifestyle    Physical activity     Days per week: Not on file     Minutes per session: Not on file    Stress: Not on file   Relationships    Social connections     Talks on phone: Not on file     Gets together: Not on file     Attends Anabaptist service: Not on file     Active member of club or organization: Not on file     Attends meetings of clubs or organizations: Not on file     Relationship status: Not on file    Intimate partner violence     Fear of current or ex partner: Not on file     Emotionally abused: Not on file     Physically abused: Not on file     Forced sexual activity: Not on file   Other Topics Concern    Not on file   Social History Narrative    Not on file         ALLERGIES: Latex, natural rubber; Clindamycin; Amoxicillin; Doxycycline; Epinephrine; Gluten; Lyrica [pregabalin]; Neosporin [benzalkonium chloride]; Penicillins; Sulfa (sulfonamide antibiotics); and Zithromax [azithromycin]    Review of Systems   Constitutional: Negative for chills and fever. Gastrointestinal: Negative for nausea and vomiting. All other systems reviewed and are negative. Vitals:    06/15/20 2130   BP: 119/82   Pulse: 93   Resp: 18   Temp: 97.9 °F (36.6 °C)   SpO2: 99%   Weight: 111.1 kg (245 lb)            Physical Exam  Vitals signs and nursing note reviewed. Constitutional:       Appearance: Normal appearance. She is well-developed. HENT:      Head: Normocephalic and atraumatic. Eyes:      Conjunctiva/sclera: Conjunctivae normal.      Pupils: Pupils are equal, round, and reactive to light. Neck:      Musculoskeletal: Normal range of motion and neck supple. Pulmonary:      Effort: Pulmonary effort is normal. No respiratory distress. Musculoskeletal:         General: No tenderness. Right lower leg: No edema. Left lower leg: No edema. Skin:     General: Skin is warm and dry. Neurological:      Mental Status: She is alert and oriented to person, place, and time.    Psychiatric:         Behavior: Behavior normal.          MDM  Number of Diagnoses or Management Options  Allergic reaction, initial encounter: new and requires workup  Diagnosis management comments: 11:55 PM results with patient, she states she is feeling better after prednisone. She appears comfortable and in no distress. She has minimal erythema in her right antecubital fossa, I do not feel that it warrants any antibiotics especially given her recent allergic reaction. She was told to stop taking the clindamycin.        Amount and/or Complexity of Data Reviewed  Clinical lab tests: ordered and reviewed    Risk of Complications, Morbidity, and/or Mortality  Presenting problems: moderate  Diagnostic procedures: moderate  Management options: moderate    Patient Progress  Patient progress: improved         Procedures

## 2020-08-27 ENCOUNTER — HOSPITAL ENCOUNTER (EMERGENCY)
Age: 30
Discharge: HOME | End: 2020-08-28
Attending: EMERGENCY MEDICINE

## 2020-08-27 DIAGNOSIS — G43.909 MIGRAINE WITHOUT STATUS MIGRAINOSUS, NOT INTRACTABLE, UNSPECIFIED MIGRAINE TYPE: Primary | ICD-10-CM

## 2020-08-27 PROCEDURE — 99283 EMERGENCY DEPT VISIT LOW MDM: CPT

## 2020-08-27 PROCEDURE — 96374 THER/PROPH/DIAG INJ IV PUSH: CPT

## 2020-08-27 PROCEDURE — 96375 TX/PRO/DX INJ NEW DRUG ADDON: CPT

## 2020-08-27 RX ORDER — DIPHENHYDRAMINE HYDROCHLORIDE 50 MG/ML
12.5 INJECTION, SOLUTION INTRAMUSCULAR; INTRAVENOUS
Status: COMPLETED | OUTPATIENT
Start: 2020-08-27 | End: 2020-08-28

## 2020-08-27 RX ORDER — HALOPERIDOL 5 MG/ML
2.5 INJECTION INTRAMUSCULAR ONCE
Status: COMPLETED | OUTPATIENT
Start: 2020-08-27 | End: 2020-08-28

## 2020-08-28 VITALS
DIASTOLIC BLOOD PRESSURE: 62 MMHG | BODY MASS INDEX: 37.89 KG/M2 | SYSTOLIC BLOOD PRESSURE: 107 MMHG | OXYGEN SATURATION: 98 % | TEMPERATURE: 98.6 F | WEIGHT: 250 LBS | HEART RATE: 98 BPM | RESPIRATION RATE: 16 BRPM | HEIGHT: 68 IN

## 2020-08-28 PROCEDURE — 74011250636 HC RX REV CODE- 250/636: Performed by: EMERGENCY MEDICINE

## 2020-08-28 RX ORDER — SUMATRIPTAN 100 MG/1
100 TABLET, FILM COATED ORAL
Qty: 10 TAB | Refills: 0 | Status: SHIPPED | OUTPATIENT
Start: 2020-08-28

## 2020-08-28 RX ADMIN — DIPHENHYDRAMINE HYDROCHLORIDE 12.5 MG: 50 INJECTION, SOLUTION INTRAMUSCULAR; INTRAVENOUS at 00:08

## 2020-08-28 RX ADMIN — SODIUM CHLORIDE 1000 ML: 900 INJECTION, SOLUTION INTRAVENOUS at 00:08

## 2020-08-28 RX ADMIN — HALOPERIDOL LACTATE 2.5 MG: 5 INJECTION, SOLUTION INTRAMUSCULAR at 00:08

## 2020-08-28 NOTE — DISCHARGE INSTRUCTIONS
Take medications as prescribed  Follow-up with your neurologist  Return to the ER for any new, worsening or life-threatening symptoms      Migraine Headache: Care Instructions  Your Care Instructions  Migraines are painful, throbbing headaches that often start on one side of the head. They may cause nausea and vomiting and make you sensitive to light, sound, or smell. Without treatment, migraines can last from 4 hours to a few days. Medicines can help prevent migraines or stop them after they have started. Your doctor can help you find which ones work best for you. Follow-up care is a key part of your treatment and safety. Be sure to make and go to all appointments, and call your doctor if you are having problems. It's also a good idea to know your test results and keep a list of the medicines you take. How can you care for yourself at home? · Do not drive if you have taken a prescription pain medicine. · Rest in a quiet, dark room until your headache is gone. Close your eyes, and try to relax or go to sleep. Don't watch TV or read. · Put a cold, moist cloth or cold pack on the painful area for 10 to 20 minutes at a time. Put a thin cloth between the cold pack and your skin. · Use a warm, moist towel or a heating pad set on low to relax tight shoulder and neck muscles. · Have someone gently massage your neck and shoulders. · Take your medicines exactly as prescribed. Call your doctor if you think you are having a problem with your medicine. You will get more details on the specific medicines your doctor prescribes. · Don't take medicine for headache pain too often. Talk to your doctor if you are taking medicine more than 2 days a week to stop a headache. Taking too much pain medicine can lead to more headaches. These are called medicine-overuse headaches. To prevent migraines  · Keep a headache diary so you can figure out what triggers your headaches. Avoiding triggers may help you prevent headaches. Record when each headache began, how long it lasted, and what the pain was like. Write down any other symptoms you had with the headache, such as nausea, flashing lights or dark spots, or sensitivity to bright light or loud noise. Note if the headache occurred near your period. List anything that might have triggered the headache. Triggers may include certain foods (chocolate, cheese, wine) or odors, smoke, bright light, stress, or lack of sleep. · If your doctor has prescribed medicine for your migraines, take it as directed. You may have medicine that you take only when you get a migraine and medicine that you take all the time to help prevent migraines. ? If your doctor has prescribed medicine for when you get a headache, take it at the first sign of a migraine, unless your doctor has given you other instructions. ? If your doctor has prescribed medicine to prevent migraines, take it exactly as prescribed. Call your doctor if you think you are having a problem with your medicine. · Find healthy ways to deal with stress. Migraines are most common during or right after stressful times. Try finding ways to reduce stress like practicing mindfulness or deep breathing exercises. · Get plenty of sleep and exercise. But be careful to not push yourself too hard during exercise. It may trigger a headache. · Eat meals on a regular schedule. Avoid foods and drinks that often trigger migraines. These include chocolate, alcohol (especially red wine and port), aspartame, monosodium glutamate (MSG), and some additives found in foods (such as hot dogs, sloan, cold cuts, aged cheeses, and pickled foods). · Limit caffeine. Don't drink too much coffee, tea, or soda. But don't quit caffeine suddenly. That can also give you migraines. · Do not smoke or allow others to smoke around you. If you need help quitting, talk to your doctor about stop-smoking programs and medicines.  These can increase your chances of quitting for good.  · If you are taking birth control pills or hormone therapy, talk to your doctor about whether they are triggering your migraines. When should you call for help? [id number] anytime you think you may need emergency care. For example, call if:  · You have signs of a stroke. These may include:  ? Sudden numbness, paralysis, or weakness in your face, arm, or leg, especially on only one side of your body. ? Sudden vision changes. ? Sudden trouble speaking. ? Sudden confusion or trouble understanding simple statements. ? Sudden problems with walking or balance. ? A sudden, severe headache that is different from past headaches. Call your doctor now or seek immediate medical care if:  · You have new or worse nausea and vomiting. · You have a new or higher fever. · Your headache gets much worse. Watch closely for changes in your health, and be sure to contact your doctor if:  · You are not getting better after 2 days (48 hours). Where can you learn more? Go to http://www.gray.com/  Enter C954 in the search box to learn more about \"Migraine Headache: Care Instructions. \"  Current as of: November 20, 2019               Content Version: 12.5  © 2525-5011 [employer]. Care instructions adapted under license by [employer] (which disclaims liability or warranty for this information). If you have questions about a medical condition or this instruction, always ask your healthcare professional. [address] any warranty or liability for your use of this information.

## 2020-08-28 NOTE — ED PROVIDER NOTES
Patient presents to the ER complaining of headache. Patient reports a history of migraines. Has had a right frontal headache for the past couple days. Reports some accompanying nausea and vomiting. Denies fevers or chills. Denies any neck pain or neck stiffness. States this headache consistent with her previous migraine    The history is provided by the patient. Migraine    This is a new problem. The current episode started more than 2 days ago. The problem has not changed since onset. The quality of the pain is described as dull and throbbing. The pain is at a severity of 3/10. The pain is mild. Associated symptoms include malaise/fatigue and weakness. Pertinent negatives include no fever, no chest pressure, no palpitations, no shortness of breath and no vomiting.         Past Medical History:   Diagnosis Date    Neurological disorder     Other ill-defined conditions(799.89)     narcalepsy    Other ill-defined conditions(799.89)     fibromyalgia    Other ill-defined conditions(799.89)     PCOS (polycystic ovarian syndrome)        Past Surgical History:   Procedure Laterality Date    HX CHOLECYSTECTOMY      HX WISDOM TEETH EXTRACTION           Family History:   Problem Relation Age of Onset    Diabetes Father     Hypertension Father     Breast Cancer Neg Hx     Ovarian Cancer Neg Hx     Colon Cancer Neg Hx        Social History     Socioeconomic History    Marital status: SINGLE     Spouse name: Not on file    Number of children: Not on file    Years of education: Not on file    Highest education level: Not on file   Occupational History    Not on file   Social Needs    Financial resource strain: Not on file    Food insecurity     Worry: Not on file     Inability: Not on file    Transportation needs     Medical: Not on file     Non-medical: Not on file   Tobacco Use    Smoking status: Never Smoker    Smokeless tobacco: Never Used   Substance and Sexual Activity    Alcohol use: No    Drug use: No    Sexual activity: Not Currently   Lifestyle    Physical activity     Days per week: Not on file     Minutes per session: Not on file    Stress: Not on file   Relationships    Social connections     Talks on phone: Not on file     Gets together: Not on file     Attends Catholic service: Not on file     Active member of club or organization: Not on file     Attends meetings of clubs or organizations: Not on file     Relationship status: Not on file    Intimate partner violence     Fear of current or ex partner: Not on file     Emotionally abused: Not on file     Physically abused: Not on file     Forced sexual activity: Not on file   Other Topics Concern    Not on file   Social History Narrative    Not on file         ALLERGIES: Latex, natural rubber; Clindamycin; Amoxicillin; Doxycycline; Epinephrine; Gluten; Lyrica [pregabalin]; Neosporin [benzalkonium chloride]; Penicillins; Sulfa (sulfonamide antibiotics); and Zithromax [azithromycin]    Review of Systems   Constitutional: Positive for malaise/fatigue. Negative for fatigue and fever. HENT: Negative for congestion and dental problem. Eyes: Negative for visual disturbance. Respiratory: Negative for chest tightness and shortness of breath. Cardiovascular: Negative for palpitations and leg swelling. Gastrointestinal: Negative for abdominal pain and vomiting. Genitourinary: Negative for flank pain. Musculoskeletal: Negative for back pain and gait problem. Skin: Negative for color change and pallor. Neurological: Positive for weakness. Negative for light-headedness. Hematological: Negative for adenopathy. Does not bruise/bleed easily. Psychiatric/Behavioral: Negative for behavioral problems and confusion. All other systems reviewed and are negative.       Vitals:    08/27/20 2033   BP: 126/83   Pulse: 98   Resp: 16   Temp: 98.6 °F (37 °C)   SpO2: 100%   Weight: 113.4 kg (250 lb)   Height: 5' 8\" (1.727 m) Physical Exam  Vitals signs and nursing note reviewed. Constitutional:       Appearance: Normal appearance. HENT:      Head: Normocephalic. Nose: Nose normal.   Eyes:      Conjunctiva/sclera: Conjunctivae normal.      Pupils: Pupils are equal, round, and reactive to light. Neck:      Musculoskeletal: Normal range of motion and neck supple. Cardiovascular:      Rate and Rhythm: Normal rate and regular rhythm. Pulses: Normal pulses. Heart sounds: Normal heart sounds. Pulmonary:      Effort: Pulmonary effort is normal.      Breath sounds: Normal breath sounds. Abdominal:      General: Abdomen is flat. Palpations: Abdomen is soft. Musculoskeletal: Normal range of motion. General: No swelling or tenderness. Skin:     General: Skin is warm and dry. Capillary Refill: Capillary refill takes less than 2 seconds. Neurological:      General: No focal deficit present. Mental Status: She is alert. MDM  Number of Diagnoses or Management Options  Diagnosis management comments: Well-appearing here. No signs of meningeal irritation    1:18 AM  Patient feels better here. Plan to discharge home. Amount and/or Complexity of Data Reviewed  Clinical lab tests: ordered and reviewed    Risk of Complications, Morbidity, and/or Mortality  Presenting problems: low  Diagnostic procedures: low  Management options: low           Procedures                 Voice dictation software was used during the making of this note. This software is not perfect and grammatical and other typographical errors may be present. This note has been proofread, but may still contain errors.   Yadira Ngo MD; 8/28/2020 @1:18 AM   ===================================================================

## 2020-08-28 NOTE — ED TRIAGE NOTES
Patient arrives to ED from home. Patient states she is having a really bad migraine. Patient has history of migraines. Patient states she is out of the medicine she takes for her migraines. Patient states she took tylenol and ibuprofen at home but nothing has helped. Patient states she is sensitive to light and sound.

## 2020-09-04 ENCOUNTER — HOSPITAL ENCOUNTER (EMERGENCY)
Age: 30
Discharge: HOME | End: 2020-09-05
Attending: EMERGENCY MEDICINE

## 2020-09-04 ENCOUNTER — APPOINTMENT (OUTPATIENT)
Dept: GENERAL RADIOLOGY | Age: 30
End: 2020-09-04
Attending: EMERGENCY MEDICINE

## 2020-09-04 VITALS
HEIGHT: 68 IN | OXYGEN SATURATION: 99 % | TEMPERATURE: 98.3 F | RESPIRATION RATE: 13 BRPM | SYSTOLIC BLOOD PRESSURE: 117 MMHG | HEART RATE: 82 BPM | WEIGHT: 250 LBS | DIASTOLIC BLOOD PRESSURE: 65 MMHG | BODY MASS INDEX: 37.89 KG/M2

## 2020-09-04 DIAGNOSIS — R07.9 CHEST PAIN, UNSPECIFIED TYPE: Primary | ICD-10-CM

## 2020-09-04 LAB
ALBUMIN SERPL-MCNC: 3.7 G/DL (ref 3.5–5)
ALBUMIN/GLOB SERPL: 1.1 {RATIO} (ref 1.2–3.5)
ALP SERPL-CCNC: 62 U/L (ref 50–136)
ALT SERPL-CCNC: 27 U/L (ref 12–65)
ANION GAP SERPL CALC-SCNC: 5 MMOL/L (ref 7–16)
AST SERPL-CCNC: 16 U/L (ref 15–37)
BASOPHILS # BLD: 0 K/UL (ref 0–0.2)
BASOPHILS NFR BLD: 1 % (ref 0–2)
BILIRUB SERPL-MCNC: 0.1 MG/DL (ref 0.2–1.1)
BUN SERPL-MCNC: 16 MG/DL (ref 6–23)
CALCIUM SERPL-MCNC: 9.3 MG/DL (ref 8.3–10.4)
CHLORIDE SERPL-SCNC: 107 MMOL/L (ref 98–107)
CO2 SERPL-SCNC: 29 MMOL/L (ref 21–32)
CREAT SERPL-MCNC: 1.02 MG/DL (ref 0.6–1)
DIFFERENTIAL METHOD BLD: ABNORMAL
EOSINOPHIL # BLD: 0.1 K/UL (ref 0–0.8)
EOSINOPHIL NFR BLD: 1 % (ref 0.5–7.8)
ERYTHROCYTE [DISTWIDTH] IN BLOOD BY AUTOMATED COUNT: 12.7 % (ref 11.9–14.6)
GLOBULIN SER CALC-MCNC: 3.3 G/DL (ref 2.3–3.5)
GLUCOSE SERPL-MCNC: 85 MG/DL (ref 65–100)
HCG UR QL: NEGATIVE
HCT VFR BLD AUTO: 34.4 % (ref 35.8–46.3)
HGB BLD-MCNC: 11.4 G/DL (ref 11.7–15.4)
IMM GRANULOCYTES # BLD AUTO: 0 K/UL (ref 0–0.5)
IMM GRANULOCYTES NFR BLD AUTO: 0 % (ref 0–5)
LIPASE SERPL-CCNC: 236 U/L (ref 73–393)
LYMPHOCYTES # BLD: 2.5 K/UL (ref 0.5–4.6)
LYMPHOCYTES NFR BLD: 37 % (ref 13–44)
MCH RBC QN AUTO: 29.4 PG (ref 26.1–32.9)
MCHC RBC AUTO-ENTMCNC: 33.1 G/DL (ref 31.4–35)
MCV RBC AUTO: 88.7 FL (ref 79.6–97.8)
MONOCYTES # BLD: 0.5 K/UL (ref 0.1–1.3)
MONOCYTES NFR BLD: 8 % (ref 4–12)
NEUTS SEG # BLD: 3.6 K/UL (ref 1.7–8.2)
NEUTS SEG NFR BLD: 53 % (ref 43–78)
NRBC # BLD: 0 K/UL (ref 0–0.2)
PLATELET # BLD AUTO: 328 K/UL (ref 150–450)
PMV BLD AUTO: 9.9 FL (ref 9.4–12.3)
POTASSIUM SERPL-SCNC: 3.9 MMOL/L (ref 3.5–5.1)
PROT SERPL-MCNC: 7 G/DL (ref 6.3–8.2)
RBC # BLD AUTO: 3.88 M/UL (ref 4.05–5.2)
SODIUM SERPL-SCNC: 141 MMOL/L (ref 136–145)
TROPONIN-HIGH SENSITIVITY: 6.1 PG/ML (ref 0–14)
WBC # BLD AUTO: 6.7 K/UL (ref 4.3–11.1)

## 2020-09-04 PROCEDURE — 96375 TX/PRO/DX INJ NEW DRUG ADDON: CPT

## 2020-09-04 PROCEDURE — 84484 ASSAY OF TROPONIN QUANT: CPT

## 2020-09-04 PROCEDURE — 81025 URINE PREGNANCY TEST: CPT

## 2020-09-04 PROCEDURE — 71046 X-RAY EXAM CHEST 2 VIEWS: CPT

## 2020-09-04 PROCEDURE — 85025 COMPLETE CBC W/AUTO DIFF WBC: CPT

## 2020-09-04 PROCEDURE — 99284 EMERGENCY DEPT VISIT MOD MDM: CPT

## 2020-09-04 PROCEDURE — 80053 COMPREHEN METABOLIC PANEL: CPT

## 2020-09-04 PROCEDURE — 74011250636 HC RX REV CODE- 250/636: Performed by: EMERGENCY MEDICINE

## 2020-09-04 PROCEDURE — 96374 THER/PROPH/DIAG INJ IV PUSH: CPT

## 2020-09-04 PROCEDURE — 83690 ASSAY OF LIPASE: CPT

## 2020-09-04 PROCEDURE — 93005 ELECTROCARDIOGRAM TRACING: CPT | Performed by: EMERGENCY MEDICINE

## 2020-09-04 RX ORDER — MORPHINE SULFATE 2 MG/ML
4 INJECTION, SOLUTION INTRAMUSCULAR; INTRAVENOUS ONCE
Status: COMPLETED | OUTPATIENT
Start: 2020-09-04 | End: 2020-09-04

## 2020-09-04 RX ORDER — KETOROLAC TROMETHAMINE 15 MG/ML
15 INJECTION, SOLUTION INTRAMUSCULAR; INTRAVENOUS
Status: COMPLETED | OUTPATIENT
Start: 2020-09-04 | End: 2020-09-04

## 2020-09-04 RX ORDER — ONDANSETRON 2 MG/ML
4 INJECTION INTRAMUSCULAR; INTRAVENOUS
Status: COMPLETED | OUTPATIENT
Start: 2020-09-04 | End: 2020-09-04

## 2020-09-04 RX ADMIN — MORPHINE SULFATE 4 MG: 2 INJECTION, SOLUTION INTRAMUSCULAR; INTRAVENOUS at 22:00

## 2020-09-04 RX ADMIN — KETOROLAC TROMETHAMINE 15 MG: 15 INJECTION, SOLUTION INTRAMUSCULAR; INTRAVENOUS at 23:06

## 2020-09-04 RX ADMIN — ONDANSETRON 4 MG: 2 INJECTION INTRAMUSCULAR; INTRAVENOUS at 21:59

## 2020-09-05 LAB
ATRIAL RATE: 82 BPM
CALCULATED P AXIS, ECG09: 64 DEGREES
CALCULATED R AXIS, ECG10: 63 DEGREES
CALCULATED T AXIS, ECG11: 55 DEGREES
DIAGNOSIS, 93000: NORMAL
P-R INTERVAL, ECG05: 150 MS
Q-T INTERVAL, ECG07: 342 MS
QRS DURATION, ECG06: 94 MS
QTC CALCULATION (BEZET), ECG08: 399 MS
TROPONIN-HIGH SENSITIVITY: 8.2 PG/ML (ref 0–14)
VENTRICULAR RATE, ECG03: 82 BPM

## 2020-09-05 PROCEDURE — 84484 ASSAY OF TROPONIN QUANT: CPT

## 2020-09-05 NOTE — ED NOTES
Patient present to ED with complaints of midsternal chest pain. Patient states that pain radiates to back.  Patient states that she has a history of blood clots in right arm

## 2020-09-05 NOTE — ED PROVIDER NOTES
HPI   25-year-old female history of migraines, right upper extremity DVT, on Xarelto anticoagulation presents to the emergency department with complaint of sharp bilateral chest pain. Shortly prior to arrival.  No dyspnea. She has associated nausea and mild headache. Denies neck stiffness rashes or photophobia. Denies hemoptysis or lower extremity edema. Her prior DVT was apparently related to an IV stick in her right upper extremity. Denies abdominal pain. She is status post cholecystectomy. Denies tearing sensation of the chest/thorax. Cardiac risk factors. Obesity, tobacco abuse. She is not known to have history of hypertension, hyperlipidemia. She does not have a nuclear family history of CAD although her grandparents had CAD. No known history of CAD CHF VHD or cardiac arrhythmias. No prior stress test or cardiac cath. Patient states she quit smoking 1 week ago. She endorses compliance with all of her medications.     Past Medical History:   Diagnosis Date    Neurological disorder     Other ill-defined conditions(799.89)     narcalepsy    Other ill-defined conditions(799.89)     fibromyalgia    Other ill-defined conditions(799.89)     PCOS (polycystic ovarian syndrome)        Past Surgical History:   Procedure Laterality Date    HX CHOLECYSTECTOMY      HX WISDOM TEETH EXTRACTION           Family History:   Problem Relation Age of Onset    Diabetes Father     Hypertension Father     Breast Cancer Neg Hx     Ovarian Cancer Neg Hx     Colon Cancer Neg Hx        Social History     Socioeconomic History    Marital status: SINGLE     Spouse name: Not on file    Number of children: Not on file    Years of education: Not on file    Highest education level: Not on file   Occupational History    Not on file   Social Needs    Financial resource strain: Not on file    Food insecurity     Worry: Not on file     Inability: Not on file    Transportation needs     Medical: Not on file Non-medical: Not on file   Tobacco Use    Smoking status: Never Smoker    Smokeless tobacco: Never Used   Substance and Sexual Activity    Alcohol use: No    Drug use: No    Sexual activity: Not Currently   Lifestyle    Physical activity     Days per week: Not on file     Minutes per session: Not on file    Stress: Not on file   Relationships    Social connections     Talks on phone: Not on file     Gets together: Not on file     Attends Jain service: Not on file     Active member of club or organization: Not on file     Attends meetings of clubs or organizations: Not on file     Relationship status: Not on file    Intimate partner violence     Fear of current or ex partner: Not on file     Emotionally abused: Not on file     Physically abused: Not on file     Forced sexual activity: Not on file   Other Topics Concern    Not on file   Social History Narrative    Not on file         ALLERGIES: Latex, natural rubber; Clindamycin; Amoxicillin; Doxycycline; Epinephrine; Gluten; Lyrica [pregabalin]; Neosporin [benzalkonium chloride]; Penicillins; Sulfa (sulfonamide antibiotics); and Zithromax [azithromycin]    Review of Systems  Review of Systems Negative Except as in HPI    Vitals:    09/04/20 2150   BP: 140/69   Pulse: 97   Resp: 16   Temp: 98.3 °F (36.8 °C)   SpO2: 97%   Weight: 113.4 kg (250 lb)   Height: 5' 8\" (1.727 m)            Physical Exam  Vitals signs and nursing note reviewed. Constitutional:       General: She is not in acute distress. Appearance: She is not ill-appearing, toxic-appearing or diaphoretic. Eyes:      Extraocular Movements: Extraocular movements intact. Neck:      Vascular: No JVD. Cardiovascular:      Rate and Rhythm: Normal rate and regular rhythm. Pulses:           Radial pulses are 2+ on the right side and 2+ on the left side. Abdominal:      Palpations: Abdomen is soft. Tenderness: There is no abdominal tenderness.    Musculoskeletal:      Comments: Neck and back grossly unremarkable. No acute gross extremity abnormalities noted. Skin:     General: Skin is warm and dry. Neurological:      Comments: Awake, alert. GCS 15. CN II-XII grossly intact. Speech clear. No gross lateralizing neuro deficits. Psychiatric:         Behavior: Behavior normal.          MDM  Number of Diagnoses or Management Options  Diagnosis management comments: Patient with 2- troponins. Will discharge with cardiology follow-up. Amount and/or Complexity of Data Reviewed  Clinical lab tests: ordered and reviewed  Tests in the radiology section of CPT®: ordered and reviewed  Tests in the medicine section of CPT®: ordered and reviewed    Patient Progress  Patient progress: stable         Procedures        EKG performed at 2154. Normal sinus rhythm at 82. Normal axis. No ST elevations or depressions. No significant T wave abnormalities. Intervals normal.  No delta waves, epsilon morphology, or Brugada syndrome noted. Recent Results (from the past 12 hour(s))   CBC WITH AUTOMATED DIFF    Collection Time: 09/04/20  9:54 PM   Result Value Ref Range    WBC 6.7 4.3 - 11.1 K/uL    RBC 3.88 (L) 4.05 - 5.2 M/uL    HGB 11.4 (L) 11.7 - 15.4 g/dL    HCT 34.4 (L) 35.8 - 46.3 %    MCV 88.7 79.6 - 97.8 FL    MCH 29.4 26.1 - 32.9 PG    MCHC 33.1 31.4 - 35.0 g/dL    RDW 12.7 11.9 - 14.6 %    PLATELET 614 008 - 794 K/uL    MPV 9.9 9.4 - 12.3 FL    ABSOLUTE NRBC 0.00 0.0 - 0.2 K/uL    DF AUTOMATED      NEUTROPHILS 53 43 - 78 %    LYMPHOCYTES 37 13 - 44 %    MONOCYTES 8 4.0 - 12.0 %    EOSINOPHILS 1 0.5 - 7.8 %    BASOPHILS 1 0.0 - 2.0 %    IMMATURE GRANULOCYTES 0 0.0 - 5.0 %    ABS. NEUTROPHILS 3.6 1.7 - 8.2 K/UL    ABS. LYMPHOCYTES 2.5 0.5 - 4.6 K/UL    ABS. MONOCYTES 0.5 0.1 - 1.3 K/UL    ABS. EOSINOPHILS 0.1 0.0 - 0.8 K/UL    ABS. BASOPHILS 0.0 0.0 - 0.2 K/UL    ABS. IMM.  GRANS. 0.0 0.0 - 0.5 K/UL   METABOLIC PANEL, COMPREHENSIVE    Collection Time: 09/04/20  9:54 PM   Result Value Ref Range    Sodium 141 136 - 145 mmol/L    Potassium 3.9 3.5 - 5.1 mmol/L    Chloride 107 98 - 107 mmol/L    CO2 29 21 - 32 mmol/L    Anion gap 5 (L) 7 - 16 mmol/L    Glucose 85 65 - 100 mg/dL    BUN 16 6 - 23 MG/DL    Creatinine 1.02 (H) 0.6 - 1.0 MG/DL    GFR est AA >60 >60 ml/min/1.73m2    GFR est non-AA >60 >60 ml/min/1.73m2    Calcium 9.3 8.3 - 10.4 MG/DL    Bilirubin, total 0.1 (L) 0.2 - 1.1 MG/DL    ALT (SGPT) 27 12 - 65 U/L    AST (SGOT) 16 15 - 37 U/L    Alk. phosphatase 62 50 - 136 U/L    Protein, total 7.0 6.3 - 8.2 g/dL    Albumin 3.7 3.5 - 5.0 g/dL    Globulin 3.3 2.3 - 3.5 g/dL    A-G Ratio 1.1 (L) 1.2 - 3.5     LIPASE    Collection Time: 09/04/20  9:54 PM   Result Value Ref Range    Lipase 236 73 - 393 U/L   TROPONIN-HIGH SENSITIVITY    Collection Time: 09/04/20  9:54 PM   Result Value Ref Range    Troponin-High Sensitivity 6.1 0 - 14 pg/mL   EKG, 12 LEAD, INITIAL    Collection Time: 09/04/20  9:54 PM   Result Value Ref Range    Ventricular Rate 82 BPM    Atrial Rate 82 BPM    P-R Interval 150 ms    QRS Duration 94 ms    Q-T Interval 342 ms    QTC Calculation (Bezet) 399 ms    Calculated P Axis 64 degrees    Calculated R Axis 63 degrees    Calculated T Axis 55 degrees    Diagnosis       !! AGE AND GENDER SPECIFIC ECG ANALYSIS !! Normal sinus rhythm  Normal ECG  When compared with ECG of 07-JUN-2020 20:00,  No significant change was found         Xr Chest Pa Lat    Result Date: 9/4/2020  EXAM: Chest x-ray. INDICATION: Chest pain. COMPARISON: Prior CT chest on October 8, 2010. TECHNIQUE: Frontal and lateral view chest x-ray. FINDINGS: The lungs are clear. The cardiac size, mediastinal contour and pulmonary vasculature are normal. No pneumothorax or pleural effusion is seen. IMPRESSION: Normal chest x-ray.       Medications   morphine injection 4 mg (4 mg IntraVENous Given 9/4/20 2200)   ondansetron (ZOFRAN) injection 4 mg (4 mg IntraVENous Given 9/4/20 2159)         Update notes  10:47 PM-reviewed initial results which are benign. Given time of onset of symptoms and her risk factors, will require serial troponin. However if this is remarkable would have a low risk heart score and could be discharged home with outpatient follow-up. She is already anticoagulated with Xarelto he is not tachycardic tachypneic or hypoxic. She has no tearing sensation of the chest/thorax and has equal radial pulses. She has no cardiac murmur. I do not think she needs CTA of the chest at this point. No complaints of abdominal pain or abdominal tenderness on exam.  Benign abdominal labs. Plan for serial troponin and outpatient treatment if this is unremarkable. Signout Note/Transition of Care  The patient was signed out to the oncoming attending at shift change, at this time pending the results of studies, and for final disposition. Please see their additional documentation for the rest of the ED course. Current plan is f/u on serial troponin and disposition. Please note, this chart was dictated using Dragon dictation, voice recognition software. While efforts were made to correct any transcription errors, some may inadvertently remain. Please forgive punctuation and typographic/voice recognition errors. Please contact me with any questions concerns or for clarification of documentation.

## 2020-09-05 NOTE — ED NOTES
Pt ambulatory into triage with mask in place complaining of chest pain that began an hour and a half ago. It is constant in nature, sharp stabbing pain. Pt denies anything that makes it better or worse. Pt is on a blood thinner. She has a history of multiple DVTs in her right arm from a previous IV line. HX of seizures. Pt affiliates the CP with n/v. Denies diarrhea or shortness of breath.

## 2020-09-05 NOTE — DISCHARGE INSTRUCTIONS

## 2020-09-07 ENCOUNTER — PATIENT OUTREACH (OUTPATIENT)
Dept: CASE MANAGEMENT | Age: 30
End: 2020-09-07

## 2022-03-18 PROBLEM — F31.4 BIPOLAR I DISORDER, MOST RECENT EPISODE DEPRESSED, SEVERE WITHOUT PSYCHOTIC FEATURES (HCC): Status: ACTIVE | Noted: 2020-03-13

## 2022-03-18 PROBLEM — G43.901 STATUS MIGRAINOSUS: Status: ACTIVE | Noted: 2020-06-08

## 2022-03-22 ENCOUNTER — HOSPITAL ENCOUNTER (EMERGENCY)
Age: 32
Discharge: HOME | End: 2022-03-23
Attending: STUDENT IN AN ORGANIZED HEALTH CARE EDUCATION/TRAINING PROGRAM

## 2022-03-22 DIAGNOSIS — R51.9 ACUTE NONINTRACTABLE HEADACHE, UNSPECIFIED HEADACHE TYPE: Primary | ICD-10-CM

## 2022-03-22 PROCEDURE — 96361 HYDRATE IV INFUSION ADD-ON: CPT

## 2022-03-22 PROCEDURE — 96374 THER/PROPH/DIAG INJ IV PUSH: CPT

## 2022-03-22 PROCEDURE — 96375 TX/PRO/DX INJ NEW DRUG ADDON: CPT

## 2022-03-22 PROCEDURE — 99284 EMERGENCY DEPT VISIT MOD MDM: CPT

## 2022-03-23 VITALS
TEMPERATURE: 97.1 F | OXYGEN SATURATION: 100 % | SYSTOLIC BLOOD PRESSURE: 117 MMHG | HEART RATE: 72 BPM | WEIGHT: 293 LBS | DIASTOLIC BLOOD PRESSURE: 72 MMHG | BODY MASS INDEX: 50.02 KG/M2 | HEIGHT: 64 IN | RESPIRATION RATE: 18 BRPM

## 2022-03-23 LAB
BILIRUB UR QL: NEGATIVE
GLUCOSE UR QL STRIP.AUTO: NEGATIVE MG/DL
KETONES UR-MCNC: NEGATIVE MG/DL
LEUKOCYTE ESTERASE UR QL STRIP: NEGATIVE
NITRITE UR QL: NEGATIVE
PH UR: 8.5 [PH] (ref 5–9)
PROT UR QL: 30 MG/DL
RBC # UR STRIP: ABNORMAL /UL
SP GR UR: 1.02 (ref 1–1.02)
UROBILINOGEN UR QL: 0.2 EU/DL (ref 0.2–1)

## 2022-03-23 PROCEDURE — 81003 URINALYSIS AUTO W/O SCOPE: CPT

## 2022-03-23 PROCEDURE — 74011250636 HC RX REV CODE- 250/636: Performed by: STUDENT IN AN ORGANIZED HEALTH CARE EDUCATION/TRAINING PROGRAM

## 2022-03-23 RX ORDER — METOCLOPRAMIDE HYDROCHLORIDE 5 MG/ML
10 INJECTION INTRAMUSCULAR; INTRAVENOUS
Status: COMPLETED | OUTPATIENT
Start: 2022-03-23 | End: 2022-03-23

## 2022-03-23 RX ORDER — KETOROLAC TROMETHAMINE 15 MG/ML
15 INJECTION, SOLUTION INTRAMUSCULAR; INTRAVENOUS
Status: COMPLETED | OUTPATIENT
Start: 2022-03-23 | End: 2022-03-23

## 2022-03-23 RX ORDER — DIPHENHYDRAMINE HYDROCHLORIDE 50 MG/ML
25 INJECTION, SOLUTION INTRAMUSCULAR; INTRAVENOUS
Status: COMPLETED | OUTPATIENT
Start: 2022-03-23 | End: 2022-03-23

## 2022-03-23 RX ORDER — ONDANSETRON 2 MG/ML
4 INJECTION INTRAMUSCULAR; INTRAVENOUS
Status: COMPLETED | OUTPATIENT
Start: 2022-03-23 | End: 2022-03-23

## 2022-03-23 RX ADMIN — METOCLOPRAMIDE HYDROCHLORIDE 10 MG: 5 INJECTION INTRAMUSCULAR; INTRAVENOUS at 00:29

## 2022-03-23 RX ADMIN — KETOROLAC TROMETHAMINE 15 MG: 15 INJECTION, SOLUTION INTRAMUSCULAR; INTRAVENOUS at 00:29

## 2022-03-23 RX ADMIN — ONDANSETRON 4 MG: 2 INJECTION INTRAMUSCULAR; INTRAVENOUS at 01:27

## 2022-03-23 RX ADMIN — SODIUM CHLORIDE 1000 ML: 900 INJECTION, SOLUTION INTRAVENOUS at 00:29

## 2022-03-23 RX ADMIN — DIPHENHYDRAMINE HYDROCHLORIDE 25 MG: 50 INJECTION, SOLUTION INTRAMUSCULAR; INTRAVENOUS at 00:29

## 2022-03-23 NOTE — DISCHARGE INSTRUCTIONS
Take your home medication as needed for headaches. Alternate Tylenol and Motrin as needed as well. Follow-up with neurology within 1 week. Return to the ER for worsening or worrisome symptoms.

## 2022-03-23 NOTE — ED TRIAGE NOTES
Pt presents to the ED c/o bilateral retro orbital pain onset two days ago. Pt was diagnosed with a concussion back in August 2021 and had HA's since then. Pt had taken Tylenol, Excedrin and OTC medication with no relief.  Pt reports associated symptoms of N/V.

## 2022-03-23 NOTE — ED NOTES
I have reviewed discharge instructions with the patient. The patient verbalized understanding. Patient left ED via Discharge Method: ambulatory to Home with ride    Opportunity for questions and clarification provided. Patient given 0 scripts. To continue your aftercare when you leave the hospital, you may receive an automated call from our care team to check in on how you are doing. This is a free service and part of our promise to provide the best care and service to meet your aftercare needs.  If you have questions, or wish to unsubscribe from this service please call [phone]. Thank you for Choosing our [facility] Emergency Department.

## 2022-03-23 NOTE — ED PROVIDER NOTES
49-year-old female presents to the emergency department complaining of a headache. History of chronic migraines. States is been worse in the past year since having a concussion. Reports she follows with a concussion specialist that is associated with her . Has home medications that have not relieved her symptoms this time. Headaches been present for the past 2 days. No neck pain, no new trauma. No fever or chills. Normal sensation and muscle strength in all extremities. Past Medical History:   Diagnosis Date    Neurological disorder     Other ill-defined conditions(799.89)     narcalepsy    Other ill-defined conditions(799.89)     fibromyalgia    Other ill-defined conditions(799.89)     PCOS (polycystic ovarian syndrome)        Past Surgical History:   Procedure Laterality Date    HX CHOLECYSTECTOMY      HX WISDOM TEETH EXTRACTION           Family History:   Problem Relation Age of Onset    Diabetes Father     Hypertension Father     Breast Cancer Neg Hx     Ovarian Cancer Neg Hx     Colon Cancer Neg Hx        Social History     Socioeconomic History    Marital status: SINGLE     Spouse name: Not on file    Number of children: Not on file    Years of education: Not on file    Highest education level: Not on file   Occupational History    Not on file   Tobacco Use    Smoking status: Never Smoker    Smokeless tobacco: Never Used   Substance and Sexual Activity    Alcohol use: No    Drug use: No    Sexual activity: Not Currently   Other Topics Concern    Not on file   Social History Narrative    Not on file     Social Determinants of Health     Financial Resource Strain:     Difficulty of Paying Living Expenses: Not on file   Food Insecurity:     Worried About Running Out of Food in the Last Year: Not on file    Gracie of Food in the Last Year: Not on file   Transportation Needs:     Lack of Transportation (Medical):  Not on file    Lack of Transportation (Non-Medical): Not on file   Physical Activity:     Days of Exercise per Week: Not on file    Minutes of Exercise per Session: Not on file   Stress:     Feeling of Stress : Not on file   Social Connections:     Frequency of Communication with Friends and Family: Not on file    Frequency of Social Gatherings with Friends and Family: Not on file    Attends Hinduism Services: Not on file    Active Member of [address] [place] [address] or Organizations: Not on file    Attends Club or Organization Meetings: Not on file    Marital Status: Not on file   Intimate Partner Violence:     Fear of Current or Ex-Partner: Not on file    Emotionally Abused: Not on file    Physically Abused: Not on file    Sexually Abused: Not on file   Housing Stability:     Unable to Pay for Housing in the Last Year: Not on file    Number of [place] in the Last Year: Not on file    Unstable Housing in the Last Year: Not on file         ALLERGIES: Latex, natural rubber; Clindamycin; Amoxicillin; Doxycycline; Epinephrine; Gluten; Lyrica [pregabalin]; Neosporin [benzalkonium chloride]; Penicillins; Sulfa (sulfonamide antibiotics); and Zithromax [azithromycin]    Review of Systems   Constitutional: Negative for chills and fever. HENT: Negative for sinus pressure and sore throat. Eyes: Negative for visual disturbance. Respiratory: Negative for cough and shortness of breath. Cardiovascular: Negative for chest pain. Gastrointestinal: Negative for abdominal pain, diarrhea, nausea and vomiting. Endocrine: Negative for polyuria. Genitourinary: Negative for difficulty urinating and dysuria. Musculoskeletal: Negative for neck pain and neck stiffness. Skin: Negative for rash. Neurological: Positive for headaches. Negative for weakness. Psychiatric/Behavioral: Negative for confusion. All other systems reviewed and are negative.       Vitals:    03/22/22 2129 03/22/22 2211   BP: (!) 140/98 118/67   Pulse: 88    Resp: 18    Temp: 98.4 °F (36.9 °C)    SpO2: 100%    Weight: 133.8 kg (295 lb)    Height: 5' 4\" (1.626 m)             Physical Exam  Vitals and nursing note reviewed. Constitutional:       Appearance: Normal appearance. She is not ill-appearing or toxic-appearing. HENT:      Head: Normocephalic and atraumatic. Nose: Nose normal.      Mouth/Throat:      Mouth: Mucous membranes are moist.   Eyes:      Extraocular Movements: Extraocular movements intact. Pupils: Pupils are equal, round, and reactive to light. Cardiovascular:      Rate and Rhythm: Normal rate and regular rhythm. Pulses: Normal pulses. Heart sounds: Normal heart sounds. Pulmonary:      Effort: Pulmonary effort is normal. No respiratory distress. Breath sounds: Normal breath sounds. Abdominal:      General: Abdomen is flat. There is no distension. Palpations: Abdomen is soft. Tenderness: There is no abdominal tenderness. Musculoskeletal:         General: Normal range of motion. Cervical back: Normal range of motion. No rigidity. Skin:     General: Skin is warm and dry. Neurological:      General: No focal deficit present. Mental Status: She is alert and oriented to person, place, and time. Cranial Nerves: No cranial nerve deficit. Sensory: No sensory deficit. Motor: No weakness. Psychiatric:         Mood and Affect: Mood normal.          MDM  Number of Diagnoses or Management Options  Acute nonintractable headache, unspecified headache type  Diagnosis management comments: 76-year-old female presents the ER complaining of a headache. Patient is history of migraines current headache is consistent with her normal migraine. Normal neurologic exam.  Patient with a negative hCG and normal urinalysis. Patient given Toradol, Reglan, IV fluids as well as Benadryl. Improved symptoms after these interventions.   She has remained stable to discharge home and will follow up with her neurologist and take her home migraine medications. Given strict return precautions. She voiced understanding and agreement with this plan.        Amount and/or Complexity of Data Reviewed  Clinical lab tests: ordered and reviewed    Risk of Complications, Morbidity, and/or Mortality  Presenting problems: moderate  Diagnostic procedures: moderate  Management options: moderate           Procedures

## 2022-03-25 ENCOUNTER — HOSPITAL ENCOUNTER (EMERGENCY)
Age: 32
Discharge: HOME | End: 2022-03-31
Attending: EMERGENCY MEDICINE

## 2022-03-25 DIAGNOSIS — R45.851 SUICIDAL IDEATION: ICD-10-CM

## 2022-03-25 DIAGNOSIS — R45.851 PASSIVE SUICIDAL IDEATIONS: ICD-10-CM

## 2022-03-25 DIAGNOSIS — F31.4 BIPOLAR I DISORDER, MOST RECENT EPISODE DEPRESSED, SEVERE WITHOUT PSYCHOTIC FEATURES (HCC): ICD-10-CM

## 2022-03-25 DIAGNOSIS — F60.9 PERSONALITY DISORDER, UNSPECIFIED (HCC): ICD-10-CM

## 2022-03-25 DIAGNOSIS — Z65.8 PSYCHOSOCIAL STRESSORS: ICD-10-CM

## 2022-03-25 DIAGNOSIS — F32.A DEPRESSION, UNSPECIFIED DEPRESSION TYPE: Primary | ICD-10-CM

## 2022-03-25 LAB
ALBUMIN SERPL-MCNC: 3.8 G/DL (ref 3.5–5)
ALBUMIN/GLOB SERPL: 1.1 {RATIO} (ref 1.2–3.5)
ALP SERPL-CCNC: 86 U/L (ref 50–136)
ALT SERPL-CCNC: 32 U/L (ref 12–65)
AMPHET UR QL SCN: NEGATIVE
ANION GAP SERPL CALC-SCNC: 7 MMOL/L (ref 7–16)
APAP SERPL-MCNC: <10 UG/ML (ref 10–30)
AST SERPL-CCNC: 11 U/L (ref 15–37)
BARBITURATES UR QL SCN: NEGATIVE
BASOPHILS # BLD: 0 K/UL (ref 0–0.2)
BASOPHILS NFR BLD: 1 % (ref 0–2)
BENZODIAZ UR QL: NEGATIVE
BILIRUB SERPL-MCNC: 0.2 MG/DL (ref 0.2–1.1)
BUN SERPL-MCNC: 13 MG/DL (ref 6–23)
CALCIUM SERPL-MCNC: 8.9 MG/DL (ref 8.3–10.4)
CANNABINOIDS UR QL SCN: NEGATIVE
CHLORIDE SERPL-SCNC: 112 MMOL/L (ref 98–107)
CO2 SERPL-SCNC: 22 MMOL/L (ref 21–32)
COCAINE UR QL SCN: NEGATIVE
CREAT SERPL-MCNC: 0.9 MG/DL (ref 0.6–1)
DIFFERENTIAL METHOD BLD: NORMAL
EOSINOPHIL # BLD: 0.1 K/UL (ref 0–0.8)
EOSINOPHIL NFR BLD: 1 % (ref 0.5–7.8)
ERYTHROCYTE [DISTWIDTH] IN BLOOD BY AUTOMATED COUNT: 12.3 % (ref 11.9–14.6)
ETHANOL SERPL-MCNC: <3 MG/DL
GLOBULIN SER CALC-MCNC: 3.6 G/DL (ref 2.3–3.5)
GLUCOSE SERPL-MCNC: 106 MG/DL (ref 65–100)
HCT VFR BLD AUTO: 42.1 % (ref 35.8–46.3)
HGB BLD-MCNC: 13.9 G/DL (ref 11.7–15.4)
IMM GRANULOCYTES # BLD AUTO: 0 K/UL (ref 0–0.5)
IMM GRANULOCYTES NFR BLD AUTO: 0 % (ref 0–5)
LYMPHOCYTES # BLD: 1.5 K/UL (ref 0.5–4.6)
LYMPHOCYTES NFR BLD: 31 % (ref 13–44)
MAGNESIUM SERPL-MCNC: 2.3 MG/DL (ref 1.8–2.4)
MCH RBC QN AUTO: 29.8 PG (ref 26.1–32.9)
MCHC RBC AUTO-ENTMCNC: 33 G/DL (ref 31.4–35)
MCV RBC AUTO: 90.3 FL (ref 79.6–97.8)
METHADONE UR QL: NEGATIVE
MONOCYTES # BLD: 0.3 K/UL (ref 0.1–1.3)
MONOCYTES NFR BLD: 6 % (ref 4–12)
NEUTS SEG # BLD: 2.9 K/UL (ref 1.7–8.2)
NEUTS SEG NFR BLD: 61 % (ref 43–78)
NRBC # BLD: 0 K/UL (ref 0–0.2)
OPIATES UR QL: NEGATIVE
PCP UR QL: NEGATIVE
PLATELET # BLD AUTO: 252 K/UL (ref 150–450)
PMV BLD AUTO: 10 FL (ref 9.4–12.3)
POTASSIUM SERPL-SCNC: 4.1 MMOL/L (ref 3.5–5.1)
PROT SERPL-MCNC: 7.4 G/DL (ref 6.3–8.2)
RBC # BLD AUTO: 4.66 M/UL (ref 4.05–5.2)
SALICYLATES SERPL-MCNC: <1.7 MG/DL (ref 2.8–20)
SODIUM SERPL-SCNC: 141 MMOL/L (ref 136–145)
WBC # BLD AUTO: 4.7 K/UL (ref 4.3–11.1)

## 2022-03-25 PROCEDURE — 93005 ELECTROCARDIOGRAM TRACING: CPT | Performed by: EMERGENCY MEDICINE

## 2022-03-25 PROCEDURE — 80179 DRUG ASSAY SALICYLATE: CPT

## 2022-03-25 PROCEDURE — 83735 ASSAY OF MAGNESIUM: CPT

## 2022-03-25 PROCEDURE — 80053 COMPREHEN METABOLIC PANEL: CPT

## 2022-03-25 PROCEDURE — 82077 ASSAY SPEC XCP UR&BREATH IA: CPT

## 2022-03-25 PROCEDURE — 80307 DRUG TEST PRSMV CHEM ANLYZR: CPT

## 2022-03-25 PROCEDURE — 80143 DRUG ASSAY ACETAMINOPHEN: CPT

## 2022-03-25 PROCEDURE — 99285 EMERGENCY DEPT VISIT HI MDM: CPT

## 2022-03-25 PROCEDURE — 85025 COMPLETE CBC W/AUTO DIFF WBC: CPT

## 2022-03-25 RX ORDER — TOPIRAMATE 100 MG/1
100 TABLET, FILM COATED ORAL 2 TIMES DAILY
COMMUNITY

## 2022-03-25 RX ORDER — OXCARBAZEPINE 150 MG/1
200 TABLET, FILM COATED ORAL
COMMUNITY

## 2022-03-25 RX ORDER — OXCARBAZEPINE 150 MG/1
100 TABLET, FILM COATED ORAL 2 TIMES DAILY
COMMUNITY

## 2022-03-25 RX ORDER — ZIPRASIDONE HYDROCHLORIDE 60 MG/1
60 CAPSULE ORAL
COMMUNITY
End: 2022-03-31 | Stop reason: SDUPTHER

## 2022-03-25 RX ORDER — IBUPROFEN 800 MG/1
800 TABLET ORAL
COMMUNITY
End: 2022-04-23

## 2022-03-25 RX ORDER — ZIPRASIDONE HYDROCHLORIDE 40 MG/1
40 CAPSULE ORAL
COMMUNITY

## 2022-03-25 RX ORDER — PSEUDOEPHEDRINE HCL 30 MG
60 TABLET ORAL 2 TIMES DAILY
COMMUNITY

## 2022-03-25 RX ORDER — HYDROXYZINE PAMOATE 50 MG/1
50 CAPSULE ORAL
COMMUNITY

## 2022-03-25 RX ORDER — CETIRIZINE HCL 10 MG
10 TABLET ORAL 2 TIMES DAILY
COMMUNITY

## 2022-03-25 RX ORDER — TOPIRAMATE 200 MG/1
200 TABLET ORAL
COMMUNITY

## 2022-03-25 RX ORDER — HYDROXYZINE PAMOATE 25 MG/1
25 CAPSULE ORAL 2 TIMES DAILY
COMMUNITY
End: 2022-03-31 | Stop reason: SDUPTHER

## 2022-03-25 NOTE — ED NOTES
Constant Observer ESTEE Perry   Constant Observer Oriented YES   High risk patients are in line of sight at all times Yes   Excess equipment/medical supplies not necessary for the care of the patient removed Yes   All sharp or dangerous objects are removed from room: including but not limited to belts, pens & pencils, needles, medications, cosmetics, lighters, matches, nail files, watches, necklaces, glass objects, razors, razor blades, knives, aerosol sprays, drawstring pants, shoes, cords (telephone, call bells, etc.) cleaning wipes or other cleaning items, aluminum cans, not permanently attached wall décor Yes   Telephone/cell phone removed as well as TV remote (batteries can be swallowed) Yes   Patient belongings removed and labeled at nurses station Yes   Excess linen is removed from room Yes   All plastic bags are removed from the room and replaced with paper trash bags Yes   Patient is in paper scrubs or appropriate gown and using hospital socks with rubber soles Yes   No metal, hard eating utensils or hard plates are on meal tray Yes   Remove all cleaning agents used by Obdulia's Yes   If Crucifix is hanging on a nail, remove Crucifix as well as the nail Yes       *If any question above is answered \"No,\" documentation is required.

## 2022-03-25 NOTE — ED PROVIDER NOTES
Patient is a 26-year-old female with a history of narcolepsy, PCOS and fibromyalgia who presents with feeling depressed and suicidal for the past several months. It has been constant, getting worse. She has a history of suicide attempts, states she cut herself in the past and tried to drown herself. She has an extensive history of severe street drug use. She states she has been thinking about calling a friend of hers who has access to drugs and overdosing. Patient states that she has been feeling very isolated which has made her symptoms worse. She denies any specific precipitating factors.            Past Medical History:   Diagnosis Date    Neurological disorder     Other ill-defined conditions(799.89)     narcalepsy    Other ill-defined conditions(799.89)     fibromyalgia    Other ill-defined conditions(799.89)     PCOS (polycystic ovarian syndrome)        Past Surgical History:   Procedure Laterality Date    HX CHOLECYSTECTOMY      HX WISDOM TEETH EXTRACTION           Family History:   Problem Relation Age of Onset    Diabetes Father     Hypertension Father     Breast Cancer Neg Hx     Ovarian Cancer Neg Hx     Colon Cancer Neg Hx        Social History     Socioeconomic History    Marital status: SINGLE     Spouse name: Not on file    Number of children: Not on file    Years of education: Not on file    Highest education level: Not on file   Occupational History    Not on file   Tobacco Use    Smoking status: Never Smoker    Smokeless tobacco: Never Used   Substance and Sexual Activity    Alcohol use: No    Drug use: No    Sexual activity: Not Currently   Other Topics Concern    Not on file   Social History Narrative    Not on file     Social Determinants of Health     Financial Resource Strain:     Difficulty of Paying Living Expenses: Not on file   Food Insecurity:     Worried About Running Out of Food in the Last Year: Not on file    Gracie of Food in the Last Year: Not on file   Transportation Needs:     Lack of Transportation (Medical): Not on file    Lack of Transportation (Non-Medical): Not on file   Physical Activity:     Days of Exercise per Week: Not on file    Minutes of Exercise per Session: Not on file   Stress:     Feeling of Stress : Not on file   Social Connections:     Frequency of Communication with Friends and Family: Not on file    Frequency of Social Gatherings with Friends and Family: Not on file    Attends Buddhism Services: Not on file    Active Member of [address] [place] [address] [employer] or Organizations: Not on file    Attends Club or Organization Meetings: Not on file    Marital Status: Not on file   Intimate Partner Violence:     Fear of Current or Ex-Partner: Not on file    Emotionally Abused: Not on file    Physically Abused: Not on file    Sexually Abused: Not on file   Housing Stability:     Unable to Pay for Housing in the Last Year: Not on file    Number of [place] in the Last Year: Not on file    Unstable Housing in the Last Year: Not on file         ALLERGIES: Latex, natural rubber; Clindamycin; Amoxicillin; Doxycycline; Epinephrine; Gluten; Lyrica [pregabalin]; Neosporin [benzalkonium chloride]; Penicillins; Sulfa (sulfonamide antibiotics); and Zithromax [azithromycin]    Review of Systems   Constitutional: Negative for chills and fever. Gastrointestinal: Negative for nausea and vomiting. All other systems reviewed and are negative. Vitals:    03/25/22 1532   BP: 123/89   Pulse: (!) 104   Resp: 16   SpO2: 95%   Weight: 131.5 kg (290 lb)   Height: 5' 8\" (1.727 m)            Physical Exam  Vitals and nursing note reviewed. Constitutional:       General: She is not in acute distress. Appearance: She is well-developed. HENT:      Head: Normocephalic and atraumatic. Eyes:      Conjunctiva/sclera: Conjunctivae normal.      Pupils: Pupils are equal, round, and reactive to light.    Cardiovascular:      Rate and Rhythm: Normal rate and regular rhythm. Pulmonary:      Effort: Pulmonary effort is normal. No respiratory distress. Breath sounds: No wheezing. Musculoskeletal:         General: No tenderness. Cervical back: Normal range of motion and neck supple. Right lower leg: No edema. Left lower leg: No edema. Skin:     General: Skin is warm and dry. Neurological:      Mental Status: She is alert and oriented to person, place, and time. Psychiatric:         Behavior: Behavior normal.      Comments: Depressed affect          MDM  Number of Diagnoses or Management Options  Depression, unspecified depression type: new and requires workup  Suicidal ideation: new and requires workup  Diagnosis management comments: 11:58 PM awaiting psychiatric evaluation via telemedicine.        Amount and/or Complexity of Data Reviewed  Clinical lab tests: ordered and reviewed    Risk of Complications, Morbidity, and/or Mortality  Presenting problems: moderate  Diagnostic procedures: moderate  Management options: moderate    Patient Progress  Patient progress: stable         Procedures

## 2022-03-25 NOTE — ED NOTES
Constant Observer Yes - Name: Shane Avila   High risk patients are in line of sight at all times Yes   Excess equipment/medical supplies not necessary for the care of the patient removed Yes   All sharp or dangerous objects are removed from room: including but not limited to belts, pens & pencils, needles, medications, cosmetics, lighters, matches, nail files, watches, necklaces, glass objects, razors, razor blades, knives, aerosol sprays, drawstring pants, shoes, cords (telephone, call bells, etc.) cleaning wipes or other cleaning items, aluminum cans, not permanently attached wall décor Yes   Telephone/cell phone removed as well as TV remote (batteries can be swallowed) Yes   Patient belongings removed and labeled at nurses station Yes   Excess linen is removed from room Yes   All plastic bags are removed from the room and replaced with paper trash bags Yes   Patient is in paper scrubs or appropriate gown and using hospital socks with rubber soles Yes   No metal, hard eating utensils or hard plates are on meal tray Yes   Remove all cleaning agents used by [employer] Yes   If Crucifix is hanging on a nail, remove Crucifix as well as the nail Yes       *If any question above is answered \"No,\" documentation is required.

## 2022-03-25 NOTE — ED TRIAGE NOTES
Patient ambulatory to triage with mask in place. Patient reports she feeling depressed and suicidal. Pt reports she feels like her medications aren't working. Hx of depression and bipolar.

## 2022-03-25 NOTE — ED NOTES
Pt wanded by security, changed into blue scrubs,and belongs placed away from pt behind garage door.  Pt sitting in bed, breathing unlabored, no signs/symptoms of distress

## 2022-03-26 PROCEDURE — 74011250637 HC RX REV CODE- 250/637: Performed by: EMERGENCY MEDICINE

## 2022-03-26 RX ORDER — ZIPRASIDONE HYDROCHLORIDE 20 MG/1
40 CAPSULE ORAL
Status: DISCONTINUED | OUTPATIENT
Start: 2022-03-27 | End: 2022-03-31 | Stop reason: HOSPADM

## 2022-03-26 RX ORDER — TOPIRAMATE 100 MG/1
200 TABLET, FILM COATED ORAL
Status: DISCONTINUED | OUTPATIENT
Start: 2022-03-26 | End: 2022-03-31 | Stop reason: HOSPADM

## 2022-03-26 RX ORDER — OXCARBAZEPINE 300 MG/1
300 TABLET, FILM COATED ORAL
Status: DISCONTINUED | OUTPATIENT
Start: 2022-03-26 | End: 2022-03-31 | Stop reason: HOSPADM

## 2022-03-26 RX ORDER — HYDROXYZINE PAMOATE 25 MG/1
25 CAPSULE ORAL 2 TIMES DAILY
Status: DISCONTINUED | OUTPATIENT
Start: 2022-03-26 | End: 2022-03-31 | Stop reason: HOSPADM

## 2022-03-26 RX ORDER — ZIPRASIDONE HYDROCHLORIDE 20 MG/1
60 CAPSULE ORAL
Status: DISCONTINUED | OUTPATIENT
Start: 2022-03-26 | End: 2022-03-31 | Stop reason: HOSPADM

## 2022-03-26 RX ORDER — PSEUDOEPHEDRINE HYDROCHLORIDE 60 MG/1
60 TABLET ORAL 2 TIMES DAILY
Status: DISCONTINUED | OUTPATIENT
Start: 2022-03-26 | End: 2022-03-31 | Stop reason: HOSPADM

## 2022-03-26 RX ORDER — IBUPROFEN 800 MG/1
800 TABLET ORAL
Status: DISCONTINUED | OUTPATIENT
Start: 2022-03-26 | End: 2022-03-31 | Stop reason: HOSPADM

## 2022-03-26 RX ORDER — TOPIRAMATE 100 MG/1
100 TABLET, FILM COATED ORAL 2 TIMES DAILY
Status: DISCONTINUED | OUTPATIENT
Start: 2022-03-26 | End: 2022-03-31 | Stop reason: HOSPADM

## 2022-03-26 RX ORDER — HYDROXYZINE PAMOATE 25 MG/1
50 CAPSULE ORAL
Status: DISCONTINUED | OUTPATIENT
Start: 2022-03-26 | End: 2022-03-31 | Stop reason: HOSPADM

## 2022-03-26 RX ORDER — CETIRIZINE HCL 10 MG
10 TABLET ORAL 2 TIMES DAILY
Status: DISCONTINUED | OUTPATIENT
Start: 2022-03-26 | End: 2022-03-31 | Stop reason: HOSPADM

## 2022-03-26 RX ORDER — OXCARBAZEPINE 150 MG/1
150 TABLET, FILM COATED ORAL 2 TIMES DAILY
Status: DISCONTINUED | OUTPATIENT
Start: 2022-03-26 | End: 2022-03-31 | Stop reason: HOSPADM

## 2022-03-26 RX ADMIN — OXCARBAZEPINE 150 MG: 150 TABLET, FILM COATED ORAL at 17:01

## 2022-03-26 RX ADMIN — PSEUDOEPHEDRINE HYDROCHLORIDE 60 MG: 60 TABLET ORAL at 18:32

## 2022-03-26 RX ADMIN — HYDROXYZINE PAMOATE 25 MG: 25 CAPSULE ORAL at 17:00

## 2022-03-26 RX ADMIN — TOPIRAMATE 200 MG: 100 TABLET, FILM COATED ORAL at 21:51

## 2022-03-26 RX ADMIN — HYDROXYZINE PAMOATE 50 MG: 25 CAPSULE ORAL at 21:51

## 2022-03-26 RX ADMIN — CETIRIZINE HYDROCHLORIDE 10 MG: 10 TABLET, FILM COATED ORAL at 17:01

## 2022-03-26 RX ADMIN — OXCARBAZEPINE 300 MG: 300 TABLET, FILM COATED ORAL at 21:51

## 2022-03-26 RX ADMIN — IBUPROFEN 800 MG: 800 TABLET ORAL at 20:23

## 2022-03-26 RX ADMIN — TOPIRAMATE 100 MG: 100 TABLET, FILM COATED ORAL at 17:01

## 2022-03-26 RX ADMIN — ZIPRASIDONE HYDROCHLORIDE 60 MG: 20 CAPSULE ORAL at 21:51

## 2022-03-26 NOTE — ED NOTES
Constant Observer Yes - Name: Dion Hammans, RN   Constant Observer Oriented YES   High risk patients are in line of sight at all times Yes   Excess equipment/medical supplies not necessary for the care of the patient removed Yes   All sharp or dangerous objects are removed from room: including but not limited to belts, pens & pencils, needles, medications, cosmetics, lighters, matches, nail files, watches, necklaces, glass objects, razors, razor blades, knives, aerosol sprays, drawstring pants, shoes, cords (telephone, call bells, etc.) cleaning wipes or other cleaning items, aluminum cans, not permanently attached wall décor Yes   Telephone/cell phone removed as well as TV remote (batteries can be swallowed) Yes   Patient belongings removed and labeled at nurses station Yes   Excess linen is removed from room Yes   All plastic bags are removed from the room and replaced with paper trash bags Yes   Patient is in paper scrubs or appropriate gown and using hospital socks with rubber soles Yes   No metal, hard eating utensils or hard plates are on meal tray Yes   Remove all cleaning agents used by [employer] Yes   If Crucifix is hanging on a nail, remove Crucifix as well as the nail Yes       *If any question above is answered \"No,\" documentation is required.

## 2022-03-26 NOTE — ED NOTES
Pt currently taking a shower. Accompanied by deniz Randle and  Antoine Ramirez. Pt provided new scrubs, socks, toothbrush and paste along with soap and wash clothes.

## 2022-03-26 NOTE — ED NOTES
Patient sleeping in bed. Sitter at bedside. Respirations even and unlabored. Suicide precautions in place.

## 2022-03-26 NOTE — ED NOTES
[facility] ED Psychiatric RECHECK NOTE for 3/26/2022  Arrival Date/Time: 3/25/2022  3:34 PM      Aruna Nova  MRN: [id number]    YOB: 1990   27 y.o. female    [facility] EMERGENCY DEPT ER10/10  Seen on 3/26/2022 @ 1:03 PM        she is on papers. Commitment Papers  on: 3/29/22    she has completed a tele-psych evaluation. 3/26/22     Aruna Nova is a 27 y.o. female here for SI.     Objective:   Vitals:    22 1532 22 1609 22 0605   BP: 123/89  124/85   Pulse: (!) 104  65   Resp: 16     Temp:  98.6 °F (37 °C)    SpO2: 95%  93%       Recent Labs:   Recent Labs     22  1543      K 4.1   *   CO2 22   BUN 13   CREA 0.90   CA 8.9   *      No lab exists for component: UDS,  JOSE ARMANDO    Current Facility-Administered Medications   Medication Dose Route Frequency    cetirizine (ZYRTEC) tablet 10 mg  10 mg Oral BID    hydrOXYzine pamoate (VISTARIL) capsule 25 mg  25 mg Oral BID    hydrOXYzine pamoate (VISTARIL) capsule 50 mg  50 mg Oral QHS    ibuprofen (MOTRIN) tablet 800 mg  800 mg Oral Q6H PRN    OXcarbazepine (TRILEPTAL) tablet 150 mg  150 mg Oral BID    OXcarbazepine (TRILEPTAL) tablet 300 mg  300 mg Oral QHS    pseudoephedrine (SUDAFED) tablet 60 mg  60 mg Oral BID    topiramate (TOPAMAX) tablet 100 mg  100 mg Oral BID    topiramate (TOPAMAX) tablet 200 mg  200 mg Oral QHS    [START ON 3/27/2022] ziprasidone (GEODON) capsule 40 mg  40 mg Oral DAILY AFTER BREAKFAST    ziprasidone hcl (GEODON) capsule 60 mg  60 mg Oral QHS     Current Outpatient Medications   Medication Sig    ziprasidone (Geodon) 40 mg capsule Take 40 mg by mouth daily (after breakfast).  ziprasidone hcl (Geodon) 60 mg capsule Take 60 mg by mouth nightly.  topiramate (Topamax) 100 mg tablet Take 100 mg by mouth two (2) times a day.  topiramate (Topamax) 200 mg tablet Take 200 mg by mouth nightly.     OXcarbazepine (TrileptaL) 150 mg tablet Take 100 mg by mouth two (2) times a day.    OXcarbazepine (TrileptaL) 150 mg tablet Take 200 mg by mouth nightly.  hydrOXYzine pamoate (VistariL) 25 mg capsule Take 25 mg by mouth two (2) times a day.  hydrOXYzine pamoate (VistariL) 50 mg capsule Take 50 mg by mouth nightly.  cetirizine (ZyrTEC) 10 mg tablet Take 10 mg by mouth two (2) times a day.  pseudoephedrine (Sudafed) 30 mg tablet Take 60 mg by mouth two (2) times a day. 60 mg in the AM, 60 at bedtime    ibuprofen (MOTRIN) 800 mg tablet Take 800 mg by mouth every eight (8) hours as needed for Pain.  SUMAtriptan (Imitrex) 100 mg tablet Take 1 Tab by mouth two (2) times daily as needed for Migraine.  magnesium oxide 500 mg tab Take  by mouth.  EPINEPHrine (EPIPEN) 0.3 mg/0.3 mL injection 0.3 mg by IntraMUSCular route once as needed. Assessment and Plan: On papers. Wrote for home meds. No change in mood today.     Colette Chinchilla MD; 3/26/2022 @1:03 PM ===============

## 2022-03-26 NOTE — ED NOTES
Constant Observer Yes - Name: Roselyn Macias RN   Constant Observer Oriented YES   High risk patients are in line of sight at all times Yes   Excess equipment/medical supplies not necessary for the care of the patient removed Yes   All sharp or dangerous objects are removed from room: including but not limited to belts, pens & pencils, needles, medications, cosmetics, lighters, matches, nail files, watches, necklaces, glass objects, razors, razor blades, knives, aerosol sprays, drawstring pants, shoes, cords (telephone, call bells, etc.) cleaning wipes or other cleaning items, aluminum cans, not permanently attached wall décor Yes   Telephone/cell phone removed as well as TV remote (batteries can be swallowed) Yes   Patient belongings removed and labeled at nurses station Yes   Excess linen is removed from room Yes   All plastic bags are removed from the room and replaced with paper trash bags Yes   Patient is in paper scrubs or appropriate gown and using hospital socks with rubber soles Yes   No metal, hard eating utensils or hard plates are on meal tray Yes   Remove all cleaning agents used by [employer] Yes   If Crucifix is hanging on a nail, remove Crucifix as well as the nail Yes       *If any question above is answered \"No,\" documentation is required.

## 2022-03-27 PROCEDURE — 74011250637 HC RX REV CODE- 250/637: Performed by: EMERGENCY MEDICINE

## 2022-03-27 RX ADMIN — OXCARBAZEPINE 300 MG: 300 TABLET, FILM COATED ORAL at 21:26

## 2022-03-27 RX ADMIN — ZIPRASIDONE HYDROCHLORIDE 40 MG: 20 CAPSULE ORAL at 09:39

## 2022-03-27 RX ADMIN — HYDROXYZINE PAMOATE 25 MG: 25 CAPSULE ORAL at 09:39

## 2022-03-27 RX ADMIN — HYDROXYZINE PAMOATE 25 MG: 25 CAPSULE ORAL at 18:54

## 2022-03-27 RX ADMIN — CETIRIZINE HYDROCHLORIDE 10 MG: 10 TABLET, FILM COATED ORAL at 18:54

## 2022-03-27 RX ADMIN — ZIPRASIDONE HYDROCHLORIDE 60 MG: 20 CAPSULE ORAL at 21:27

## 2022-03-27 RX ADMIN — CETIRIZINE HYDROCHLORIDE 10 MG: 10 TABLET, FILM COATED ORAL at 09:39

## 2022-03-27 RX ADMIN — TOPIRAMATE 200 MG: 100 TABLET, FILM COATED ORAL at 21:27

## 2022-03-27 RX ADMIN — TOPIRAMATE 100 MG: 100 TABLET, FILM COATED ORAL at 18:54

## 2022-03-27 RX ADMIN — OXCARBAZEPINE 150 MG: 150 TABLET, FILM COATED ORAL at 09:39

## 2022-03-27 RX ADMIN — PSEUDOEPHEDRINE HYDROCHLORIDE 60 MG: 60 TABLET ORAL at 09:39

## 2022-03-27 RX ADMIN — TOPIRAMATE 100 MG: 100 TABLET, FILM COATED ORAL at 09:39

## 2022-03-27 RX ADMIN — OXCARBAZEPINE 150 MG: 150 TABLET, FILM COATED ORAL at 18:54

## 2022-03-27 RX ADMIN — PSEUDOEPHEDRINE HYDROCHLORIDE 60 MG: 60 TABLET ORAL at 18:54

## 2022-03-27 RX ADMIN — HYDROXYZINE PAMOATE 50 MG: 25 CAPSULE ORAL at 21:26

## 2022-03-27 NOTE — ED NOTES
Pt wants to get a pair of underwear from personal things. Allowed patient to get personal item while supervised.

## 2022-03-27 NOTE — ED NOTES
Constant Observer Yes - Name: Shonda   Constant Observer Oriented YES   High risk patients are in line of sight at all times Yes   Excess equipment/medical supplies not necessary for the care of the patient removed Yes   All sharp or dangerous objects are removed from room: including but not limited to belts, pens & pencils, needles, medications, cosmetics, lighters, matches, nail files, watches, necklaces, glass objects, razors, razor blades, knives, aerosol sprays, drawstring pants, shoes, cords (telephone, call bells, etc.) cleaning wipes or other cleaning items, aluminum cans, not permanently attached wall décor Yes   Telephone/cell phone removed as well as TV remote (batteries can be swallowed) Yes   Patient belongings removed and labeled at nurses station Yes   Excess linen is removed from room Yes   All plastic bags are removed from the room and replaced with paper trash bags Yes   Patient is in paper scrubs or appropriate gown and using hospital socks with rubber soles Yes   No metal, hard eating utensils or hard plates are on meal tray Yes   Remove all cleaning agents used by [employer] Yes   If Crucifix is hanging on a nail, remove Crucifix as well as the nail Yes       *If any question above is answered \"No,\" documentation is required.

## 2022-03-27 NOTE — ED NOTES
Constant Observer Yes - Name: Davina Jesus Observer Oriented YES   High risk patients are in line of sight at all times Yes   Excess equipment/medical supplies not necessary for the care of the patient removed Yes   All sharp or dangerous objects are removed from room: including but not limited to belts, pens & pencils, needles, medications, cosmetics, lighters, matches, nail files, watches, necklaces, glass objects, razors, razor blades, knives, aerosol sprays, drawstring pants, shoes, cords (telephone, call bells, etc.) cleaning wipes or other cleaning items, aluminum cans, not permanently attached wall décor Yes   Telephone/cell phone removed as well as TV remote (batteries can be swallowed) Yes   Patient belongings removed and labeled at nurses station Yes   Excess linen is removed from room Yes   All plastic bags are removed from the room and replaced with paper trash bags Yes   Patient is in paper scrubs or appropriate gown and using hospital socks with rubber soles Yes   No metal, hard eating utensils or hard plates are on meal tray Yes   Remove all cleaning agents used by [employer] Yes   If Crucifix is hanging on a nail, remove Crucifix as well as the nail Yes       *If any question above is answered \"No,\" documentation is required.

## 2022-03-27 NOTE — ED NOTES
Constant Observer Yes - Name: Javi Wright, PSA   Constant Observer Oriented YES   High risk patients are in line of sight at all times Yes   Excess equipment/medical supplies not necessary for the care of the patient removed Yes   All sharp or dangerous objects are removed from room: including but not limited to belts, pens & pencils, needles, medications, cosmetics, lighters, matches, nail files, watches, necklaces, glass objects, razors, razor blades, knives, aerosol sprays, drawstring pants, shoes, cords (telephone, call bells, etc.) cleaning wipes or other cleaning items, aluminum cans, not permanently attached wall décor Yes   Telephone/cell phone removed as well as TV remote (batteries can be swallowed) Yes   Patient belongings removed and labeled at nurses station Yes   Excess linen is removed from room Yes   All plastic bags are removed from the room and replaced with paper trash bags Yes   Patient is in paper scrubs or appropriate gown and using hospital socks with rubber soles Yes   No metal, hard eating utensils or hard plates are on meal tray Yes   Remove all cleaning agents used by [employer] Yes   If Crucifix is hanging on a nail, remove Crucifix as well as the nail Yes       *If any question above is answered \"No,\" documentation is required.

## 2022-03-28 PROCEDURE — 74011250637 HC RX REV CODE- 250/637: Performed by: EMERGENCY MEDICINE

## 2022-03-28 RX ORDER — DOCUSATE SODIUM 100 MG/1
100 CAPSULE, LIQUID FILLED ORAL DAILY
Status: DISCONTINUED | OUTPATIENT
Start: 2022-03-29 | End: 2022-03-28

## 2022-03-28 RX ORDER — DOCUSATE SODIUM 100 MG/1
100 CAPSULE, LIQUID FILLED ORAL DAILY
Status: DISCONTINUED | OUTPATIENT
Start: 2022-03-28 | End: 2022-03-31 | Stop reason: HOSPADM

## 2022-03-28 RX ADMIN — ZIPRASIDONE HYDROCHLORIDE 60 MG: 20 CAPSULE ORAL at 21:26

## 2022-03-28 RX ADMIN — TOPIRAMATE 200 MG: 100 TABLET, FILM COATED ORAL at 21:26

## 2022-03-28 RX ADMIN — CETIRIZINE HYDROCHLORIDE 10 MG: 10 TABLET, FILM COATED ORAL at 17:37

## 2022-03-28 RX ADMIN — CETIRIZINE HYDROCHLORIDE 10 MG: 10 TABLET, FILM COATED ORAL at 08:23

## 2022-03-28 RX ADMIN — TOPIRAMATE 100 MG: 100 TABLET, FILM COATED ORAL at 08:20

## 2022-03-28 RX ADMIN — HYDROXYZINE PAMOATE 25 MG: 25 CAPSULE ORAL at 17:36

## 2022-03-28 RX ADMIN — PSEUDOEPHEDRINE HYDROCHLORIDE 60 MG: 60 TABLET ORAL at 17:36

## 2022-03-28 RX ADMIN — OXCARBAZEPINE 150 MG: 150 TABLET, FILM COATED ORAL at 17:36

## 2022-03-28 RX ADMIN — ZIPRASIDONE HYDROCHLORIDE 40 MG: 20 CAPSULE ORAL at 08:20

## 2022-03-28 RX ADMIN — OXCARBAZEPINE 150 MG: 150 TABLET, FILM COATED ORAL at 08:20

## 2022-03-28 RX ADMIN — HYDROXYZINE PAMOATE 25 MG: 25 CAPSULE ORAL at 08:23

## 2022-03-28 RX ADMIN — OXCARBAZEPINE 300 MG: 300 TABLET, FILM COATED ORAL at 21:48

## 2022-03-28 RX ADMIN — TOPIRAMATE 100 MG: 100 TABLET, FILM COATED ORAL at 17:36

## 2022-03-28 RX ADMIN — DOCUSATE SODIUM 100 MG: 100 CAPSULE ORAL at 21:25

## 2022-03-28 RX ADMIN — HYDROXYZINE PAMOATE 50 MG: 25 CAPSULE ORAL at 21:24

## 2022-03-28 NOTE — ED NOTES
Constant Observer Yes - Name:  Mizti   Constant Observer Oriented YES   High risk patients are in line of sight at all times Yes   Excess equipment/medical supplies not necessary for the care of the patient removed Yes   All sharp or dangerous objects are removed from room: including but not limited to belts, pens & pencils, needles, medications, cosmetics, lighters, matches, nail files, watches, necklaces, glass objects, razors, razor blades, knives, aerosol sprays, drawstring pants, shoes, cords (telephone, call bells, etc.) cleaning wipes or other cleaning items, aluminum cans, not permanently attached wall décor Yes   Telephone/cell phone removed as well as TV remote (batteries can be swallowed) Yes   Patient belongings removed and labeled at nurses station Yes   Excess linen is removed from room Yes   All plastic bags are removed from the room and replaced with paper trash bags Yes   Patient is in paper scrubs or appropriate gown and using hospital socks with rubber soles Yes   No metal, hard eating utensils or hard plates are on meal tray Yes   Remove all cleaning agents used by [employer] Yes   If Crucifix is hanging on a nail, remove Crucifix as well as the nail Yes       *If any question above is answered \"No,\" documentation is required.

## 2022-03-28 NOTE — PROGRESS NOTES
Chart review complete, CM met with pt at bedside, pt states she recently moved back to [facility] from [facility] and has been having thought of Suicide states she has not attempted to harm self at this time but has plan that she will take all her pills is she doesn't get any help. Pt states she was a pt at the [address] [place] [address] prior to moving back to [facility] and has hx as a pt with the [facility] and would like a referral back to them, demographics confirmed with pt, referral will be sent to [facility] to assist with arranging followup visit. Care Management Interventions  PCP Verified by CM:  Yes  MyChart Signup: No  Discharge Durable Medical Equipment: No  Physical Therapy Consult: No  Occupational Therapy Consult: No  Speech Therapy Consult: No  Support Systems: Parent(s)  Confirm Follow Up Transport: Family  Discharge Location  Patient Expects to be Discharged to[de-identified] Psychiatric Unit

## 2022-03-28 NOTE — ED NOTES
Care assumed at this time. Report received from [facility], pt resting in bed, respirations are even and unlabored.  Sitter present

## 2022-03-28 NOTE — PROGRESS NOTES
inpt Psych referrals made to   [address] [facility]-- no beds available--1129 am 3/28  [address] [place] [address]  Continue to await response from facilities.

## 2022-03-28 NOTE — ED NOTES
Constant Observer Yes - Name: Rudell Councilman RN   Constant Observer Oriented YES   High risk patients are in line of sight at all times Yes   Excess equipment/medical supplies not necessary for the care of the patient removed Yes   All sharp or dangerous objects are removed from room: including but not limited to belts, pens & pencils, needles, medications, cosmetics, lighters, matches, nail files, watches, necklaces, glass objects, razors, razor blades, knives, aerosol sprays, drawstring pants, shoes, cords (telephone, call bells, etc.) cleaning wipes or other cleaning items, aluminum cans, not permanently attached wall décor Yes   Telephone/cell phone removed as well as TV remote (batteries can be swallowed) Yes   Patient belongings removed and labeled at nurses station Yes   Excess linen is removed from room Yes   All plastic bags are removed from the room and replaced with paper trash bags Yes   Patient is in paper scrubs or appropriate gown and using hospital socks with rubber soles Yes   No metal, hard eating utensils or hard plates are on meal tray Yes   Remove all cleaning agents used by [employer] Yes   If Crucifix is hanging on a nail, remove Crucifix as well as the nail Yes       *If any question above is answered \"No,\" documentation is required.

## 2022-03-28 NOTE — PROGRESS NOTES
[facility]                                                                                                 PATIENT INFORMATION   Patient Name: Ileana Martin: [de-identified] Patient MRN: [id number]   Address: [address] [place] [address] Patient CSN: [id number]      Anglican: NO Restorationist   Sex: Female Marital Status: SINGLE   : 1990 Age:   27 yrs   Home Phone: [phone] 2.00 Mobile Phone:        1.00   Race: [address] Employer:   Not Employed   Language: ENGLISH Admitted/Arrived From:      ADMISSION INFORMATION   Admit Date: 3/25/2022 Admit Time:  3:34 PM   Patient Class: Emergency Service: Emergency   Admit Source: Non-health care facility* Admit Type: EMERGENCY   Admitting Provider:   Attending Provider: Yolis Haney   Unit: [address] [place] [address] Emergency Dept Room/Bed: ER10/10    Admission Diagnosis:  and codes:      Emergency Complaint: PT stated needs psyc [facility]*                Discharge Date:   Discharge Time:     GUARANTOR INFORMATION   Name:  Perico Campoverde Address: [address]  Rel:  Self   Phone: [phone]   [address] [facility] : 1990   EMERGENCY CONTACTS   Name: Adrianne Cervantes [address]:  Mobile: [phone] Rel: Mother   COVERAGE INFORMATION   Primary Insurance:   Pilar Mack: Perico Campoverde   Plan Name: [employer] Generic Commercial Pt Rel to Subscriber: Self [[id number]]   Claim Address: [address] [facility] [employer] Sex: Female      Policy #:  [id number]    Group #:   Group Name:       Auth #: N/A Ins Phone:         Secondary Insurance:   Subscriber:     Plan Name:   Pt Rel to Subscriber:     Claim Address: NA Sex:       Policy #: N/A    Group #: N/A Group Name: N/A   Auth #: N/A Ins Phone:         Accident Date:    Accident Type:     PROVIDER INFORMATION   PCP:         None PCP Phone:  None   Referring Prov:   No ref.  provider found Referring Phone:  Referring Fax:  N/A      Advanced Directive: Not Received Research:     Lab Client:   Enrollment Status:              Printed on 3/28/22  8:53 AM Page      Referrals made to [facility], [address], [place], [place], [facility], [address] [facility]

## 2022-03-28 NOTE — PROGRESS NOTES
Patient resting in no acute distress. Patient states that she needs to be suicidal and have intrusive depressing thoughts. Vital signs stable. Abdomen soft, nontender with no rebound or guarding. Lungs clear to auscultation bilaterally. No focal deficits. Patient pending psychiatric facility placement.

## 2022-03-29 PROCEDURE — 90792 PSYCH DIAG EVAL W/MED SRVCS: CPT | Performed by: NURSE PRACTITIONER

## 2022-03-29 PROCEDURE — 74011250637 HC RX REV CODE- 250/637: Performed by: EMERGENCY MEDICINE

## 2022-03-29 RX ADMIN — TOPIRAMATE 100 MG: 100 TABLET, FILM COATED ORAL at 09:10

## 2022-03-29 RX ADMIN — PSEUDOEPHEDRINE HYDROCHLORIDE 60 MG: 60 TABLET ORAL at 21:11

## 2022-03-29 RX ADMIN — OXCARBAZEPINE 300 MG: 300 TABLET, FILM COATED ORAL at 21:12

## 2022-03-29 RX ADMIN — ZIPRASIDONE HYDROCHLORIDE 40 MG: 20 CAPSULE ORAL at 09:10

## 2022-03-29 RX ADMIN — IBUPROFEN 800 MG: 800 TABLET ORAL at 14:31

## 2022-03-29 RX ADMIN — OXCARBAZEPINE 150 MG: 150 TABLET, FILM COATED ORAL at 18:00

## 2022-03-29 RX ADMIN — CETIRIZINE HYDROCHLORIDE 10 MG: 10 TABLET, FILM COATED ORAL at 09:10

## 2022-03-29 RX ADMIN — HYDROXYZINE PAMOATE 50 MG: 25 CAPSULE ORAL at 21:12

## 2022-03-29 RX ADMIN — ZIPRASIDONE HYDROCHLORIDE 60 MG: 20 CAPSULE ORAL at 21:11

## 2022-03-29 RX ADMIN — CETIRIZINE HYDROCHLORIDE 10 MG: 10 TABLET, FILM COATED ORAL at 21:12

## 2022-03-29 RX ADMIN — HYDROXYZINE PAMOATE 25 MG: 25 CAPSULE ORAL at 18:00

## 2022-03-29 RX ADMIN — PSEUDOEPHEDRINE HYDROCHLORIDE 60 MG: 60 TABLET ORAL at 09:09

## 2022-03-29 RX ADMIN — TOPIRAMATE 100 MG: 100 TABLET, FILM COATED ORAL at 18:00

## 2022-03-29 RX ADMIN — OXCARBAZEPINE 150 MG: 150 TABLET, FILM COATED ORAL at 09:11

## 2022-03-29 RX ADMIN — TOPIRAMATE 200 MG: 100 TABLET, FILM COATED ORAL at 21:11

## 2022-03-29 RX ADMIN — HYDROXYZINE PAMOATE 25 MG: 25 CAPSULE ORAL at 09:10

## 2022-03-29 NOTE — ED NOTES
Pt continues to rest in bed at this time. Pt requesting something to help her go to the bathroom.  Will inform MD.

## 2022-03-29 NOTE — ED NOTES
Report given to [facility] LETY and Irasema ERAZO, transfer of care at this time.

## 2022-03-29 NOTE — ED NOTES
No signs or symptoms of distress. Respirations even and unlabored. Patient up to bathroom for shower. Sitter and security with patient. Safety precautions in place.

## 2022-03-29 NOTE — ED NOTES
Pt continues to rest in bed at this time. Watching tv, respirations are even and unlabored. Sitter at bedside.  Safety measures in place

## 2022-03-29 NOTE — ED NOTES
Patient resting in bed at this time. No signs or symptoms of distress. Respirations even and unlabored. Sitter at bedside. Safety precautions in place.

## 2022-03-29 NOTE — ED NOTES
Constant Observer Yes - Name: Sonya   Constant Observer Oriented YES   High risk patients are in line of sight at all times Yes   Excess equipment/medical supplies not necessary for the care of the patient removed Yes   All sharp or dangerous objects are removed from room: including but not limited to belts, pens & pencils, needles, medications, cosmetics, lighters, matches, nail files, watches, necklaces, glass objects, razors, razor blades, knives, aerosol sprays, drawstring pants, shoes, cords (telephone, call bells, etc.) cleaning wipes or other cleaning items, aluminum cans, not permanently attached wall décor Yes   Telephone/cell phone removed as well as TV remote (batteries can be swallowed) Yes   Patient belongings removed and labeled at nurses station Yes   Excess linen is removed from room Yes   All plastic bags are removed from the room and replaced with paper trash bags Yes   Patient is in paper scrubs or appropriate gown and using hospital socks with rubber soles Yes   No metal, hard eating utensils or hard plates are on meal tray Yes   Remove all cleaning agents used by [employer] Yes   If Crucifix is hanging on a nail, remove Crucifix as well as the nail Yes       *If any question above is answered \"No,\" documentation is required.

## 2022-03-29 NOTE — ED NOTES
Constant Observer Yes - Name: Fabio Archibald   High risk patients are in line of sight at all times Yes   Excess equipment/medical supplies not necessary for the care of the patient removed Yes   All sharp or dangerous objects are removed from room: including but not limited to belts, pens & pencils, needles, medications, cosmetics, lighters, matches, nail files, watches, necklaces, glass objects, razors, razor blades, knives, aerosol sprays, drawstring pants, shoes, cords (telephone, call bells, etc.) cleaning wipes or other cleaning items, aluminum cans, not permanently attached wall décor Yes   Telephone/cell phone removed as well as TV remote (batteries can be swallowed) Yes   Patient belongings removed and labeled at nurses station Yes   Excess linen is removed from room Yes   All plastic bags are removed from the room and replaced with paper trash bags Yes   Patient is in paper scrubs or appropriate gown and using hospital socks with rubber soles Yes   No metal, hard eating utensils or hard plates are on meal tray Yes   Remove all cleaning agents used by [employer] Yes   If Crucifix is hanging on a nail, remove Crucifix as well as the nail Yes       *If any question above is answered \"No,\" documentation is required.

## 2022-03-29 NOTE — ED NOTES
Pt continues to sleep in bed at this time. Sitter remains at bedside, respirations are even and unlabored.

## 2022-03-29 NOTE — CONSULTS
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 Latest Ref Range: 136 - 145 mmol/L  141    Potassium Latest Ref Range: 3.5 - 5.1 mmol/L  4.1    Chloride Latest Ref Range: 98 - 107 mmol/L  112 (H)    CO2 Latest Ref Range: 21 - 32 mmol/L  22    Anion gap Latest Ref Range: 7 - 16 mmol/L  7    Glucose Latest Ref Range: 65 - 100 mg/dL  106 (H)    BUN Latest Ref Range: 6 - 23 MG/DL  13    Creatinine Latest Ref Range: 0.6 - 1.0 MG/DL  0.90    Calcium Latest Ref Range: 8.3 - 10.4 MG/DL  8.9    Magnesium Latest Ref Range: 1.8 - 2.4 mg/dL  2.3    GFR est non-AA Latest Ref Range: >60 ml/min/1.73m2  >60    GFR est AA Latest Ref Range: >60 ml/min/1.73m2  >60    Bilirubin, total Latest Ref Range: 0.2 - 1.1 MG/DL  0.2    Protein, total Latest Ref Range: 6.3 - 8.2 g/dL  7.4    Albumin Latest Ref Range: 3.5 - 5.0 g/dL  3.8    Globulin Latest Ref Range: 2.3 - 3.5 g/dL  3.6 (H)    A-G Ratio Latest Ref Range: 1.2 - 3.5    1.1 (L)    ALT Latest Ref Range: 12 - 65 U/L  32    AST Latest Ref Range: 15 - 37 U/L  11 (L)    Alk.  phosphatase Latest Ref Range: 50 - 136 U/L  86    Acetaminophen level Latest Ref Range: 10.0 - 30.0 ug/mL  <46 (L)    Salicylate level Latest Ref Range: 2.8 - 20.0 MG/DL  <1.7 (L)    ALCOHOL(ETHYL),SERUM Latest Units: MG/DL  <3    AMPHETAMINES Latest Units:     Negative   BARBITURATES Latest Units:     Negative   BENZODIAZEPINES Latest Units:     Negative   COCAINE Latest Units: Negative   METHADONE Latest Units:     Negative   OPIATES Latest Units:     Negative   PCP(PHENCYCLIDINE) Latest Units:     Negative   THC (TH-CANNABINOL) Latest Units:     Negative   DRUG SCREEN, URINE Unknown   Rpt       Medical Review Of Systems:  Psychological ROS: positive for - SI, depression, sleep disturbances  Psychological ROS: negative for - HI, AVH, aggression, irritability    Mental Status Evaluation:    General Appearance Appears stated age  General Behavior Calm, cooperative, good eye contact  Psychomotor Activity Normoactive - no restlessness or tremors noted  Gait                  Steady without assist  Speech   fluent, normal volume, normal tone, normal rate, normal prosody and normal amount  Mood               depressed  Affect    blunted  Thought Process Coherent, logical  Thought Content/Perceptual Disturbances Endorses suicidal ideation / denies homicidal ideation / denies auditory/visual hallucinations, paranoia, delusions, grandiosity, increased goal directed activity, preoccupation, obsessions/compulsions and phobias  Cognition                    Alert and oriented to name, age, , month, year, place, situation   Insight             fair  Judgment            fair    3 most recent PHQ Screens 3/29/2022   Little interest or pleasure in doing things Nearly every day   Feeling down, depressed, irritable, or hopeless Nearly every day   Total Score PHQ 2 6   Trouble falling or staying asleep, or sleeping too much More than half the days   Feeling tired or having little energy More than half the days   Poor appetite, weight loss, or overeating More than half the days   Feeling bad about yourself - or that you are a failure or have let yourself or your family down More than half the days   Trouble concentrating on things such as school, work, reading, or watching TV Several days   Moving or speaking so slowly that other people could have noticed; or the opposite being so fidgety that others notice Several days   Thoughts of being better off dead, or hurting yourself in some way Nearly every day   PHQ 9 Score 19   How difficult have these problems made it for you to do your work, take care of your home and get along with others -         ASSESSMENT  Psychiatric Diagnoses:  Bipolar I disorder, most recent episode depressed, severe without psychotic features ([facility] [address] [F31.4 (ICD-10-CM)], Depression, unspecified depression type [F32. A (ICD-10-CM)], Suicidal ideation [R45.851 (ICD-10-CM)]      Recommendations:  Pt continues to meet criteria for IVC - pt continues to endorse active SI / pt cannot contract for safety at this time  Continue safety / suicide precautions  No changes to medications at this time      Henna Merchant - [facility]  3/29/2022  [facility] [place]

## 2022-03-29 NOTE — PROGRESS NOTES
Psych evaluation completed, IVC paperwork updated, referral called to [facility]. Clinical faxed, CM will await response.

## 2022-03-30 PROCEDURE — 74011250637 HC RX REV CODE- 250/637: Performed by: EMERGENCY MEDICINE

## 2022-03-30 RX ADMIN — PSEUDOEPHEDRINE HYDROCHLORIDE 60 MG: 60 TABLET ORAL at 19:18

## 2022-03-30 RX ADMIN — OXCARBAZEPINE 150 MG: 150 TABLET, FILM COATED ORAL at 19:18

## 2022-03-30 RX ADMIN — TOPIRAMATE 100 MG: 100 TABLET, FILM COATED ORAL at 09:45

## 2022-03-30 RX ADMIN — HYDROXYZINE PAMOATE 25 MG: 25 CAPSULE ORAL at 19:18

## 2022-03-30 RX ADMIN — CETIRIZINE HYDROCHLORIDE 10 MG: 10 TABLET, FILM COATED ORAL at 19:18

## 2022-03-30 RX ADMIN — DOCUSATE SODIUM 100 MG: 100 CAPSULE ORAL at 09:45

## 2022-03-30 RX ADMIN — TOPIRAMATE 200 MG: 100 TABLET, FILM COATED ORAL at 22:54

## 2022-03-30 RX ADMIN — PSEUDOEPHEDRINE HYDROCHLORIDE 60 MG: 60 TABLET ORAL at 09:45

## 2022-03-30 RX ADMIN — OXCARBAZEPINE 150 MG: 150 TABLET, FILM COATED ORAL at 09:45

## 2022-03-30 RX ADMIN — ZIPRASIDONE HYDROCHLORIDE 40 MG: 20 CAPSULE ORAL at 09:45

## 2022-03-30 RX ADMIN — HYDROXYZINE PAMOATE 25 MG: 25 CAPSULE ORAL at 09:45

## 2022-03-30 RX ADMIN — CETIRIZINE HYDROCHLORIDE 10 MG: 10 TABLET, FILM COATED ORAL at 09:45

## 2022-03-30 RX ADMIN — TOPIRAMATE 100 MG: 100 TABLET, FILM COATED ORAL at 19:18

## 2022-03-30 RX ADMIN — OXCARBAZEPINE 300 MG: 300 TABLET, FILM COATED ORAL at 22:54

## 2022-03-30 RX ADMIN — ZIPRASIDONE HYDROCHLORIDE 60 MG: 20 CAPSULE ORAL at 22:54

## 2022-03-30 RX ADMIN — HYDROXYZINE PAMOATE 50 MG: 25 CAPSULE ORAL at 22:53

## 2022-03-30 NOTE — ED NOTES
Constant Observer Yes - Name: Mirlande Vaughan PSA   Constant Observer Oriented YES   High risk patients are in line of sight at all times Yes   Excess equipment/medical supplies not necessary for the care of the patient removed Yes   All sharp or dangerous objects are removed from room: including but not limited to belts, pens & pencils, needles, medications, cosmetics, lighters, matches, nail files, watches, necklaces, glass objects, razors, razor blades, knives, aerosol sprays, drawstring pants, shoes, cords (telephone, call bells, etc.) cleaning wipes or other cleaning items, aluminum cans, not permanently attached wall décor Yes   Telephone/cell phone removed as well as TV remote (batteries can be swallowed) Yes   Patient belongings removed and labeled at nurses station Yes   Excess linen is removed from room Yes   All plastic bags are removed from the room and replaced with paper trash bags Yes   Patient is in paper scrubs or appropriate gown and using hospital socks with rubber soles Yes   No metal, hard eating utensils or hard plates are on meal tray Yes   Remove all cleaning agents used by Obdulia's Yes   If Crucifix is hanging on a nail, remove Crucifix as well as the nail Yes       *If any question above is answered \"No,\" documentation is required.

## 2022-03-30 NOTE — ED NOTES
Report received from [facility]. Pt resting in bed, sitter at bedside no current distress noted.

## 2022-03-30 NOTE — ED NOTES
Constant Observer Yes - Name: Shonda   Constant Observer Oriented YES   High risk patients are in line of sight at all times Yes   Excess equipment/medical supplies not necessary for the care of the patient removed Yes   All sharp or dangerous objects are removed from room: including but not limited to belts, pens & pencils, needles, medications, cosmetics, lighters, matches, nail files, watches, necklaces, glass objects, razors, razor blades, knives, aerosol sprays, drawstring pants, shoes, cords (telephone, call bells, etc.) cleaning wipes or other cleaning items, aluminum cans, not permanently attached wall décor Yes   Telephone/cell phone removed as well as TV remote (batteries can be swallowed) Yes   Patient belongings removed and labeled at nurses station Yes   Excess linen is removed from room Yes   All plastic bags are removed from the room and replaced with paper trash bags Yes   Patient is in paper scrubs or appropriate gown and using hospital socks with rubber soles Yes   No metal, hard eating utensils or hard plates are on meal tray Yes   Remove all cleaning agents used by Obdulia's Yes   If Crucifix is hanging on a nail, remove Crucifix as well as the nail Yes       *If any question above is answered \"No,\" documentation is required.

## 2022-03-30 NOTE — ED NOTES
Pt resting, watching TV, pt given food with medicines. No distress noted, vitals stable. Sitter at bedside.

## 2022-03-30 NOTE — ED NOTES
Pt resting in bed. Respirations even/unlabored, no distress noted. Sitter at bedside. Safety precautions in place.

## 2022-03-30 NOTE — PROGRESS NOTES
Care Management Interventions  PCP Verified by CM: Yes  MyChart Signup: No  Discharge Durable Medical Equipment: No  Physical Therapy Consult: No  Occupational Therapy Consult: No  Speech Therapy Consult: No  Support Systems: Parent(s)  Confirm Follow Up Transport: Family  Discharge Location  Patient Expects to be Discharged to[de-identified] Psychiatric Unit      CM spoke with patient's mother. Updated on placement. Patient remains on IVC. Referrals sent to [facility], [address] [place] [address], [place], [facility], [facility] & [facility]          Update:  left for mother. Patient has [facility] Medicaid plan, no out of state coverage. [facility] is able to accept under self pay rate. CM to continue to follow.

## 2022-03-30 NOTE — ED NOTES
Cooperative requiring no change in interactions/medications or other.   Is on involuntary commitment papers and has multiple facilities consulted all of which are pending

## 2022-03-31 VITALS
SYSTOLIC BLOOD PRESSURE: 106 MMHG | TEMPERATURE: 97.9 F | OXYGEN SATURATION: 98 % | BODY MASS INDEX: 43.95 KG/M2 | RESPIRATION RATE: 18 BRPM | WEIGHT: 290 LBS | DIASTOLIC BLOOD PRESSURE: 74 MMHG | HEART RATE: 78 BPM | HEIGHT: 68 IN

## 2022-03-31 PROCEDURE — 99284 EMERGENCY DEPT VISIT MOD MDM: CPT | Performed by: NURSE PRACTITIONER

## 2022-03-31 PROCEDURE — 74011250637 HC RX REV CODE- 250/637: Performed by: EMERGENCY MEDICINE

## 2022-03-31 RX ORDER — HYDROXYZINE PAMOATE 25 MG/1
25 CAPSULE ORAL
Qty: 90 CAPSULE | Refills: 0 | Status: SHIPPED | OUTPATIENT
Start: 2022-03-31 | End: 2022-04-30

## 2022-03-31 RX ORDER — ZIPRASIDONE HYDROCHLORIDE 60 MG/1
60 CAPSULE ORAL
Qty: 30 CAPSULE | Refills: 2 | Status: SHIPPED | OUTPATIENT
Start: 2022-03-31 | End: 2022-04-30

## 2022-03-31 RX ADMIN — HYDROXYZINE PAMOATE 25 MG: 25 CAPSULE ORAL at 09:20

## 2022-03-31 RX ADMIN — OXCARBAZEPINE 150 MG: 150 TABLET, FILM COATED ORAL at 09:20

## 2022-03-31 RX ADMIN — ZIPRASIDONE HYDROCHLORIDE 40 MG: 20 CAPSULE ORAL at 09:20

## 2022-03-31 RX ADMIN — TOPIRAMATE 100 MG: 100 TABLET, FILM COATED ORAL at 09:20

## 2022-03-31 RX ADMIN — CETIRIZINE HYDROCHLORIDE 10 MG: 10 TABLET, FILM COATED ORAL at 09:20

## 2022-03-31 NOTE — ED NOTES
Patient requesting to speak with Sherley Galvez to discuss plan of care and inpatient treatment plan.  Left message with other  in pod c for - update patient she will come see patient when returns

## 2022-03-31 NOTE — ED NOTES
I have reviewed discharge instructions with the patient and family. The patient verbalized understanding. Patient left ED via Discharge Method: ambulatory to Home with family    Opportunity for questions and clarification provided. Patient given 0 scripts. To continue your aftercare when you leave the hospital, you may receive an automated call from our care team to check in on how you are doing. This is a free service and part of our promise to provide the best care and service to meet your aftercare needs.  If you have questions, or wish to unsubscribe from this service please call [phone]. Thank you for Choosing our [facility] Emergency Department.

## 2022-03-31 NOTE — DISCHARGE INSTRUCTIONS
Your medications as prescribed  No illegal or street drugs  Return at any point with significant worsening or self harming thoughts returning

## 2022-03-31 NOTE — PROGRESS NOTES
Care Management Interventions  PCP Verified by CM: Yes  MyChart Signup: No  Discharge Durable Medical Equipment: No  Physical Therapy Consult: No  Occupational Therapy Consult: No  Speech Therapy Consult: No  Support Systems: Other Family Member(s)  Confirm Follow Up Transport: Family  The Plan for Transition of Care is Related to the Following Treatment Goals : home with outpatient services  The Patient and/or Patient Representative was Provided with a Choice of Provider and Agrees with the Discharge Plan?: Yes  Name of the Patient Representative Who was Provided with a Choice of Provider and Agrees with the Discharge Plan: Patient  Freedom of Choice List was Provided with Basic Dialogue that Supports the Patient's Individualized Plan of Care/Goals, Treatment Preferences and Shares the Quality Data Associated with the Providers?: Yes  Discharge Location  Patient Expects to be Discharged to[de-identified] Home with outpatient services      CM met with patient, offered resources for [facility], [facility], [employer], & [employer]. Spoke in depth about safety, follow up and adherence to medication. Patient is agreeable to OP follow up. Patient is able to contract for safety. MD to assess. CM to continue to follow.

## 2022-03-31 NOTE — PROGRESS NOTES
PROGRESS NOTE    Date of Service: 03/31/22        CC: Follow up. Kaushik Gallegos is a 27 y.o. female with a past psychiatric history of bipolar, depression, PTSD, anxiety. 3- - Met with pt in the room. Pt calm, cooperative. No changes per pt / continues to endorse depression / pt continues to endorse decreased motivation or pleasure, feeling bad about herself / but aware she needs to discharge home and work on getting established with services in [place] (PCP, Mental Health, community support such as [employer] . She discusses feelings of decreased support  / stating her mother will tell her one day she loves her and the next day tell her she's an \"abomination. \"  Discussed future plans / gaining community support if she feels she is not getting sufficient support from family - pt verbalized understanding and in agreement / pt states she plans on staying in [place] (vs going back to [facility]). Returned to room with CM. Discussed and CM made available resources for pt / Nadine Jackson, [employer] [address] [place] [address]. LONG discussion with patient about need to get established with a therapist to help her manage her anxiety, stressors along with hx of trauma. LONG discussion with pt about accomplishments to date, including:  Staying sober from drugs for almost 2 yrs, removing self from a toxic relationship, moving back to [place], being 4 classes into a [employer].  Pt listened / verbalized agreement. Validated pts fears with starting over again in [place] / fears with decreased support in the home.         Psychiatric Review Of Systems:  Sleep: \"ok\"  Appetite: \"ok\"  Current suicidal/homicidal ideations: passive SI with no intent or plan negative for HI  Current auditory/visual hallucinations: negative for AVH - pt does not appear to be responding to any internal stimuli at this time     Medications:    Current Facility-Administered Medications:     docusate sodium (COLACE) capsule 100 mg, 100 mg, Oral, DAILY, Po Butt MD, 100 mg at 03/30/22 0945    cetirizine (ZYRTEC) tablet 10 mg, 10 mg, Oral, BID, Dave SAL MD, 10 mg at 03/31/22 0920    hydrOXYzine pamoate (VISTARIL) capsule 25 mg, 25 mg, Oral, BID, Dave SAL MD, 25 mg at 03/31/22 0920    hydrOXYzine pamoate (VISTARIL) capsule 50 mg, 50 mg, Oral, QHS, Dave SAL MD, 50 mg at 03/30/22 2253    ibuprofen (MOTRIN) tablet 800 mg, 800 mg, Oral, Q6H PRN, Dave SAL MD, 800 mg at 03/29/22 1431    OXcarbazepine (TRILEPTAL) tablet 150 mg, 150 mg, Oral, BID, Ivet Badillo MD, 150 mg at 03/31/22 0920    OXcarbazepine (TRILEPTAL) tablet 300 mg, 300 mg, Oral, QHS, Dave SAL MD, 300 mg at 03/30/22 2254    pseudoephedrine (SUDAFED) tablet 60 mg, 60 mg, Oral, BID, Dave SAL MD, 60 mg at 03/30/22 1918    topiramate (TOPAMAX) tablet 100 mg, 100 mg, Oral, BID, Dave SAL MD, 100 mg at 03/31/22 0920    topiramate (TOPAMAX) tablet 200 mg, 200 mg, Oral, QHS, Dave SAL MD, 200 mg at 03/30/22 2254    ziprasidone (GEODON) capsule 40 mg, 40 mg, Oral, DAILY AFTER BREAKFAST, Bindu Lopez MD, 40 mg at 03/31/22 0920    ziprasidone (GEODON) capsule 60 mg, 60 mg, Oral, QHS, Bindu Hill MD, 60 mg at 03/30/22 2254    Current Outpatient Medications:     ziprasidone (Geodon) 40 mg capsule, Take 40 mg by mouth daily (after breakfast). , Disp: , Rfl:     ziprasidone hcl (Geodon) 60 mg capsule, Take 60 mg by mouth nightly., Disp: , Rfl:     topiramate (Topamax) 100 mg tablet, Take 100 mg by mouth two (2) times a day., Disp: , Rfl:     topiramate (Topamax) 200 mg tablet, Take 200 mg by mouth nightly., Disp: , Rfl:     OXcarbazepine (TrileptaL) 150 mg tablet, Take 100 mg by mouth two (2) times a day., Disp: , Rfl:     OXcarbazepine (TrileptaL) 150 mg tablet, Take 200 mg by mouth nightly., Disp: , Rfl:     hydrOXYzine pamoate (VistariL) 25 mg capsule, Take 25 mg by mouth two (2) times a day., Disp: , Rfl:     hydrOXYzine pamoate (VistariL) 50 mg capsule, Take 50 mg by mouth nightly., Disp: , Rfl:     cetirizine (ZyrTEC) 10 mg tablet, Take 10 mg by mouth two (2) times a day., Disp: , Rfl:     pseudoephedrine (Sudafed) 30 mg tablet, Take 60 mg by mouth two (2) times a day. 60 mg in the AM, 60 at bedtime, Disp: , Rfl:     ibuprofen (MOTRIN) 800 mg tablet, Take 800 mg by mouth every eight (8) hours as needed for Pain., Disp: , Rfl:     SUMAtriptan (Imitrex) 100 mg tablet, Take 1 Tab by mouth two (2) times daily as needed for Migraine. , Disp: 10 Tab, Rfl: 0    magnesium oxide 500 mg tab, Take  by mouth., Disp: , Rfl:     EPINEPHrine (EPIPEN) 0.3 mg/0.3 mL injection, 0.3 mg by IntraMUSCular route once as needed. , Disp: , Rfl:     PMH:  Past Medical History:   Diagnosis Date    Neurological disorder     Other ill-defined conditions(799.89)     narcalepsy    Other ill-defined conditions(799.89)     fibromyalgia    Other ill-defined conditions(799.89)     PCOS (polycystic ovarian syndrome)        Vitals:  Visit Vitals  BP (!) 107/57   Pulse 81   Temp 97.9 °F (36.6 °C)   Resp 20   Ht 5' 8\" (1.727 m)   Wt 290 lb (131.5 kg)   SpO2 96%   BMI 44.09 kg/m²       ROS:  Psychological ROS: positive for - passive SI without intent or plan, depression  Psychological ROS: negative for - active SI/HI/AVH, aggression, disorientation    Assessment:  Psychiatric Diagnoses:  Depression, unspecified depression type [F32. A (ICD-10-CM)], Passive suicidal ideations [R45.851 (ICD-10-CM)], Personality disorder, unspecified ([facility] [address] [F60.9 (ICD-10-CM)], Psychosocial stressors [Z65.8 (ICD-10-CM)]      Plan:  Recommendation:   -Pt currently does not meet criteria for IVC - passive SI without intent or plan - pt is agreeable to outpt follow up with [address] consider continuing home medications -  Geodon and Vistaril  -Please send referral to [facility] - pt aware [facility] may call CM with appt or the patient - CM will contact pt if [facility] sends her appt time - Strongly Encourage CONSISTENT behavioral therapy  -Resources given to pt for: [facility] Crisis Hotline, [address], [address], Suicide Hot Line  -Crisis plan reviewed and patient verbally contracts for safety. Go to ED with emergent symptoms or safety concerns, including any SI/HI/AVH or any adverse reactions to medications   - Discussed with MD - along with CM - all in agreement with plan    Henna Yao PMHNP-BC  [address]

## 2022-04-23 ENCOUNTER — HOSPITAL ENCOUNTER (EMERGENCY)
Age: 32
Discharge: HOME | End: 2022-04-23

## 2022-04-23 VITALS
OXYGEN SATURATION: 100 % | DIASTOLIC BLOOD PRESSURE: 93 MMHG | HEIGHT: 68 IN | RESPIRATION RATE: 16 BRPM | HEART RATE: 93 BPM | SYSTOLIC BLOOD PRESSURE: 120 MMHG | WEIGHT: 290 LBS | TEMPERATURE: 98.6 F | BODY MASS INDEX: 43.95 KG/M2

## 2022-04-23 DIAGNOSIS — K04.7 DENTAL ABSCESS: Primary | ICD-10-CM

## 2022-04-23 PROCEDURE — 99283 EMERGENCY DEPT VISIT LOW MDM: CPT

## 2022-04-23 PROCEDURE — 74011250637 HC RX REV CODE- 250/637: Performed by: NURSE PRACTITIONER

## 2022-04-23 RX ORDER — CEPHALEXIN 500 MG/1
500 CAPSULE ORAL 4 TIMES DAILY
Qty: 28 CAPSULE | Refills: 0 | Status: SHIPPED | OUTPATIENT
Start: 2022-04-23 | End: 2022-04-30

## 2022-04-23 RX ORDER — HYDROCODONE BITARTRATE AND ACETAMINOPHEN 5; 325 MG/1; MG/1
1 TABLET ORAL ONCE
Status: COMPLETED | OUTPATIENT
Start: 2022-04-23 | End: 2022-04-23

## 2022-04-23 RX ORDER — IBUPROFEN 800 MG/1
800 TABLET ORAL
Qty: 20 TABLET | Refills: 0 | Status: SHIPPED | OUTPATIENT
Start: 2022-04-23 | End: 2022-04-30

## 2022-04-23 RX ADMIN — HYDROCODONE BITARTRATE AND ACETAMINOPHEN 1 TABLET: 5; 325 TABLET ORAL at 09:27

## 2022-04-23 NOTE — DISCHARGE INSTRUCTIONS
Take medication as prescribed. Follow-up with a dentist.  Return to the ER for any new, worsening, or concerning symptoms.

## 2022-04-23 NOTE — ED TRIAGE NOTES
Patient ambulatory to triage with mask in place. Patient reports right upper dental pain x 3 days with swelling.

## 2022-04-23 NOTE — ED NOTES
I have reviewed discharge instructions with the patient. The patient verbalized understanding. Patient left ED via Discharge Method: ambulatory to Home with friend. Opportunity for questions and clarification provided. Patient given 2 scripts. To continue your aftercare when you leave the hospital, you may receive an automated call from our care team to check in on how you are doing. This is a free service and part of our promise to provide the best care and service to meet your aftercare needs.  If you have questions, or wish to unsubscribe from this service please call [phone]. Thank you for Choosing our [facility] Emergency Department.

## 2022-04-23 NOTE — ED PROVIDER NOTES
40-year-old female presents emergency department today with complaint of right upper dental pain. Pain began 3 days ago. She states that she has noticed some swelling. She states she broke a tooth in this area about 2 years ago but it is never given her any problems. She states she took ibuprofen for the pain without relief. She denies any fever, chills, nausea, vomiting, diarrhea, chest pain, abdominal pain, shortness of breath, or difficulty swallowing. The history is provided by the patient. Dental Pain  This is a new problem. The current episode started more than 2 days ago. The problem occurs constantly. The problem has been gradually worsening. Pertinent negatives include no chest pain, no abdominal pain, no headaches and no shortness of breath. The symptoms are aggravated by eating. Nothing relieves the symptoms.         Past Medical History:   Diagnosis Date    Neurological disorder     Other ill-defined conditions(799.89)     narcalepsy    Other ill-defined conditions(799.89)     fibromyalgia    Other ill-defined conditions(799.89)     PCOS (polycystic ovarian syndrome)        Past Surgical History:   Procedure Laterality Date    HX CHOLECYSTECTOMY      HX WISDOM TEETH EXTRACTION           Family History:   Problem Relation Age of Onset    Diabetes Father     Hypertension Father     Breast Cancer Neg Hx     Ovarian Cancer Neg Hx     Colon Cancer Neg Hx        Social History     Socioeconomic History    Marital status: SINGLE     Spouse name: Not on file    Number of children: Not on file    Years of education: Not on file    Highest education level: Not on file   Occupational History    Not on file   Tobacco Use    Smoking status: Never Smoker    Smokeless tobacco: Never Used   Substance and Sexual Activity    Alcohol use: No    Drug use: No    Sexual activity: Not Currently   Other Topics Concern    Not on file   Social History Narrative    Not on file     Social Determinants of Health     Financial Resource Strain:     Difficulty of Paying Living Expenses: Not on file   Food Insecurity:     Worried About Running Out of Food in the Last Year: Not on file    Gracie of Food in the Last Year: Not on file   Transportation Needs:     Lack of Transportation (Medical): Not on file    Lack of Transportation (Non-Medical): Not on file   Physical Activity:     Days of Exercise per Week: Not on file    Minutes of Exercise per Session: Not on file   Stress:     Feeling of Stress : Not on file   Social Connections:     Frequency of Communication with Friends and Family: Not on file    Frequency of Social Gatherings with Friends and Family: Not on file    Attends Yarsani Services: Not on file    Active Member of [address] [place] [address] [employer] or Organizations: Not on file    Attends Club or Organization Meetings: Not on file    Marital Status: Not on file   Intimate Partner Violence:     Fear of Current or Ex-Partner: Not on file    Emotionally Abused: Not on file    Physically Abused: Not on file    Sexually Abused: Not on file   Housing Stability:     Unable to Pay for Housing in the Last Year: Not on file    Number of [place] in the Last Year: Not on file    Unstable Housing in the Last Year: Not on file         ALLERGIES: Latex, natural rubber; Clindamycin; Amoxicillin; Doxycycline; Epinephrine; Flagyl [metronidazole]; Gluten; Lyrica [pregabalin]; Neosporin [benzalkonium chloride]; Penicillins; Sulfa (sulfonamide antibiotics); and Zithromax [azithromycin]    Review of Systems   Constitutional: Negative for fever. HENT: Positive for dental problem. Negative for trouble swallowing. Respiratory: Negative for shortness of breath. Cardiovascular: Negative for chest pain. Gastrointestinal: Negative for abdominal pain. Neurological: Negative for headaches. All other systems reviewed and are negative.       Vitals:    04/23/22 0913   BP: (!) 120/93   Pulse: 93   Resp: 16 Temp: 98.6 °F (37 °C)   SpO2: 100%   Weight: 131.5 kg (290 lb)   Height: 5' 8\" (1.727 m)            Physical Exam  Vitals and nursing note reviewed. Constitutional:       General: She is not in acute distress. Appearance: Normal appearance. She is not ill-appearing, toxic-appearing or diaphoretic. HENT:      Head: Normocephalic and atraumatic. Right Ear: External ear normal.      Left Ear: External ear normal.      Mouth/Throat:      Mouth: Mucous membranes are moist.      Dentition: Abnormal dentition. Dental tenderness and gingival swelling present. Pharynx: Oropharynx is clear. Eyes:      General: No scleral icterus. Extraocular Movements: Extraocular movements intact. Conjunctiva/sclera: Conjunctivae normal.   Cardiovascular:      Rate and Rhythm: Normal rate. Pulmonary:      Effort: Pulmonary effort is normal. No respiratory distress. Abdominal:      General: Abdomen is flat. There is no distension. Musculoskeletal:         General: Normal range of motion. Cervical back: Normal range of motion and neck supple. No rigidity. Skin:     General: Skin is warm and dry. Capillary Refill: Capillary refill takes less than 2 seconds. Neurological:      General: No focal deficit present. Mental Status: She is alert and oriented to person, place, and time. Psychiatric:         Mood and Affect: Mood normal.         Behavior: Behavior normal.         Thought Content: Thought content normal.         Judgment: Judgment normal.          MDM  Number of Diagnoses or Management Options  Dental abscess: new and does not require workup  Diagnosis management comments: 26-year-old female presents emergency department today with complaint of dental pain. On exam, patient has a broken tooth to the right upper jaw with some gingival swelling. She appears uncomfortable but is in no acute distress today. Respirations are even and unlabored.   She is speaking in full sentences and tolerating oral secretions without difficulty. Suspicious for dental abscess but no drainable abscess noted on exam.  She has multiple antibiotic allergies. She believes that she is able to tolerate Keflex so we will try this. List of dental clinics provided. I have discussed the results of all labs, procedures, radiographs, and/or treatments with the patient and available family members. Megankyara AndrewsSharmila is agreed upon by the patient and the patient is ready for discharge.  Questions about treatment in the ED and differential diagnosis of presenting condition were answered. Rafaela Perfect was given verbal discharge instructions including, but not limited to, importance of returning to the emergency department for any concern of worsening or continued symptoms.  Instructions were given to follow up with a primary care provider or specialist within 1-2 days. Jairo Bolden effects of medications, if prescribed, were discussed and patient was advised to refrain from significant physical activity until followed up by primary care physician and to not drive or operate heavy machinery after taking any sedating substances.      Alison Hoff NP; 4/23/2022 @9:34 AM Voice dictation software was used during the making of  this note. This software is not perfect and grammatical and other typographical errors  may be present. This note has not been proofread for errors.       Risk of Complications, Morbidity, and/or Mortality  Presenting problems: low  Diagnostic procedures: low  Management options: low    Patient Progress  Patient progress: improved         Procedures

## 2022-05-20 ENCOUNTER — HOSPITAL ENCOUNTER (EMERGENCY)
Age: 32
Discharge: HOME | End: 2022-05-21
Payer: COMMERCIAL

## 2022-05-20 ENCOUNTER — APPOINTMENT (OUTPATIENT)
Dept: CT IMAGING | Age: 32
End: 2022-05-20
Payer: COMMERCIAL

## 2022-05-20 VITALS
HEART RATE: 89 BPM | BODY MASS INDEX: 42.44 KG/M2 | DIASTOLIC BLOOD PRESSURE: 87 MMHG | SYSTOLIC BLOOD PRESSURE: 111 MMHG | RESPIRATION RATE: 16 BRPM | WEIGHT: 280 LBS | HEIGHT: 68 IN | TEMPERATURE: 98.7 F | OXYGEN SATURATION: 98 %

## 2022-05-20 DIAGNOSIS — R25.3 MUSCLE TWITCHING: Primary | ICD-10-CM

## 2022-05-20 LAB
ALBUMIN SERPL-MCNC: 3.7 G/DL (ref 3.5–5)
ALBUMIN/GLOB SERPL: 1.2 {RATIO} (ref 1.2–3.5)
ALP SERPL-CCNC: 69 U/L (ref 50–136)
ALT SERPL-CCNC: 28 U/L (ref 12–65)
ANION GAP SERPL CALC-SCNC: 7 MMOL/L (ref 7–16)
AST SERPL-CCNC: 11 U/L (ref 15–37)
BASOPHILS # BLD: 0 K/UL (ref 0–0.2)
BASOPHILS NFR BLD: 0 % (ref 0–2)
BILIRUB SERPL-MCNC: 0.2 MG/DL (ref 0.2–1.1)
BILIRUB UR QL: NEGATIVE
BUN SERPL-MCNC: 12 MG/DL (ref 6–23)
CALCIUM SERPL-MCNC: 9.3 MG/DL (ref 8.3–10.4)
CHLORIDE SERPL-SCNC: 113 MMOL/L (ref 98–107)
CO2 SERPL-SCNC: 25 MMOL/L (ref 21–32)
CREAT SERPL-MCNC: 0.9 MG/DL (ref 0.6–1)
DIFFERENTIAL METHOD BLD: ABNORMAL
EOSINOPHIL # BLD: 0.1 K/UL (ref 0–0.8)
EOSINOPHIL NFR BLD: 0 % (ref 0.5–7.8)
ERYTHROCYTE [DISTWIDTH] IN BLOOD BY AUTOMATED COUNT: 12.1 % (ref 11.9–14.6)
GLOBULIN SER CALC-MCNC: 3.2 G/DL (ref 2.3–3.5)
GLUCOSE SERPL-MCNC: 103 MG/DL (ref 65–100)
GLUCOSE UR QL STRIP.AUTO: NEGATIVE MG/DL
HCT VFR BLD AUTO: 38.8 % (ref 35.8–46.3)
HGB BLD-MCNC: 13 G/DL (ref 11.7–15.4)
IMM GRANULOCYTES # BLD AUTO: 0 K/UL (ref 0–0.5)
IMM GRANULOCYTES NFR BLD AUTO: 0 % (ref 0–5)
KETONES UR-MCNC: NEGATIVE MG/DL
LEUKOCYTE ESTERASE UR QL STRIP: NEGATIVE
LIPASE SERPL-CCNC: 139 U/L (ref 73–393)
LYMPHOCYTES # BLD: 2.6 K/UL (ref 0.5–4.6)
LYMPHOCYTES NFR BLD: 23 % (ref 13–44)
MCH RBC QN AUTO: 29.7 PG (ref 26.1–32.9)
MCHC RBC AUTO-ENTMCNC: 33.5 G/DL (ref 31.4–35)
MCV RBC AUTO: 88.8 FL (ref 79.6–97.8)
MONOCYTES # BLD: 0.6 K/UL (ref 0.1–1.3)
MONOCYTES NFR BLD: 5 % (ref 4–12)
NEUTS SEG # BLD: 8 K/UL (ref 1.7–8.2)
NEUTS SEG NFR BLD: 72 % (ref 43–78)
NITRITE UR QL: NEGATIVE
NRBC # BLD: 0 K/UL (ref 0–0.2)
PH UR: 6.5 [PH] (ref 5–9)
PLATELET # BLD AUTO: 263 K/UL (ref 150–450)
PMV BLD AUTO: 9.8 FL (ref 9.4–12.3)
POTASSIUM SERPL-SCNC: 3.6 MMOL/L (ref 3.5–5.1)
PROT SERPL-MCNC: 6.9 G/DL (ref 6.3–8.2)
PROT UR QL: NEGATIVE MG/DL
RBC # BLD AUTO: 4.37 M/UL (ref 4.05–5.2)
RBC # UR STRIP: NEGATIVE /UL
SERVICE CMNT-IMP: ABNORMAL
SODIUM SERPL-SCNC: 145 MMOL/L (ref 136–145)
SP GR UR: >1.03 (ref 1–1.02)
UROBILINOGEN UR QL: 0.2 EU/DL (ref 0.2–1)
WBC # BLD AUTO: 11.3 K/UL (ref 4.3–11.1)

## 2022-05-20 PROCEDURE — 85025 COMPLETE CBC W/AUTO DIFF WBC: CPT

## 2022-05-20 PROCEDURE — 70450 CT HEAD/BRAIN W/O DYE: CPT

## 2022-05-20 PROCEDURE — 80053 COMPREHEN METABOLIC PANEL: CPT

## 2022-05-20 PROCEDURE — 83690 ASSAY OF LIPASE: CPT

## 2022-05-20 PROCEDURE — 75810000275 HC EMERGENCY DEPT VISIT NO LEVEL OF CARE

## 2022-05-20 PROCEDURE — 9990 CHARGE CONVERSION

## 2022-05-20 PROCEDURE — 81003 URINALYSIS AUTO W/O SCOPE: CPT

## 2022-05-21 ENCOUNTER — HOSPITAL ENCOUNTER (EMERGENCY)
Age: 32
Discharge: HOME | End: 2022-05-21
Admitting: EMERGENCY MEDICINE

## 2022-05-21 ENCOUNTER — HOSPITAL ENCOUNTER (OUTPATIENT)
Dept: CT IMAGING | Age: 32
Discharge: HOME | End: 2022-05-24

## 2022-05-21 VITALS — DIASTOLIC BLOOD PRESSURE: 63 MMHG | OXYGEN SATURATION: 96 % | SYSTOLIC BLOOD PRESSURE: 108 MMHG

## 2022-05-21 DIAGNOSIS — M62.838 SPASM OF MUSCLE: Primary | ICD-10-CM

## 2022-05-21 DIAGNOSIS — F44.5 PSEUDOSEIZURE: ICD-10-CM

## 2022-05-21 PROCEDURE — 96374 THER/PROPH/DIAG INJ IV PUSH: CPT

## 2022-05-21 PROCEDURE — 70450 CT HEAD/BRAIN W/O DYE: CPT

## 2022-05-21 PROCEDURE — 6360000002 HC RX W HCPCS

## 2022-05-21 PROCEDURE — 6370000000 HC RX 637 (ALT 250 FOR IP)

## 2022-05-21 PROCEDURE — 99284 EMERGENCY DEPT VISIT MOD MDM: CPT

## 2022-05-21 RX ORDER — DIPHENHYDRAMINE HCL 25 MG
25 CAPSULE ORAL
Status: COMPLETED | OUTPATIENT
Start: 2022-05-21 | End: 2022-05-21

## 2022-05-21 RX ORDER — METOCLOPRAMIDE HYDROCHLORIDE 5 MG/ML
10 INJECTION INTRAMUSCULAR; INTRAVENOUS
Status: COMPLETED | OUTPATIENT
Start: 2022-05-21 | End: 2022-05-21

## 2022-05-21 RX ORDER — ACETAMINOPHEN 500 MG
1000 TABLET ORAL ONCE
Status: COMPLETED | OUTPATIENT
Start: 2022-05-21 | End: 2022-05-21

## 2022-05-21 RX ADMIN — METOCLOPRAMIDE 10 MG: 5 INJECTION, SOLUTION INTRAMUSCULAR; INTRAVENOUS at 05:26

## 2022-05-21 RX ADMIN — DIPHENHYDRAMINE HYDROCHLORIDE 25 MG: 25 CAPSULE ORAL at 05:25

## 2022-05-21 RX ADMIN — ACETAMINOPHEN 1000 MG: 500 TABLET, FILM COATED ORAL at 05:17

## 2022-05-21 NOTE — ED TRIAGE NOTES
Pt states she has been having seizures     It first one was the upper part of her leg started having like contractions \"everything was spasming\"  Lasting about 2 minutes    2nd one started in the the same left leg and going up and down her leg into her chest lasting only 30seconds     States this all happened about 2 hours ago     Pt states she is feeling nauseated and has a \"splitting headache\"

## 2022-05-24 ASSESSMENT — ENCOUNTER SYMPTOMS
RESPIRATORY NEGATIVE: 1
GASTROINTESTINAL NEGATIVE: 1
EYES NEGATIVE: 1
SHORTNESS OF BREATH: 0

## 2022-05-24 NOTE — ED PROVIDER NOTES
[facility] Emergency Department Provider Note                     PCP:                None Provider               Age: 27 y.o. Sex: female           ICD-10-CM    1. Spasm of muscle  M62.838        DISPOSITION Decision To Discharge 05/21/2022 06:02:47 AM       Discharge Medication List as of 5/21/2022  6:05 AM          MDM  Number of Diagnoses or Management Options  Spasm of muscle: established, worsening  Diagnosis management comments: Patient headache is typical of her usual migraine headache she has been imaged before in the past.  I will treat her headache. She had good relief from her headache and muscle spasms. Amount and/or Complexity of Data Reviewed  Clinical lab tests: ordered and reviewed  Tests in the medicine section of CPT®: ordered and reviewed  Decide to obtain previous medical records or to obtain history from someone other than the patient: yes  Review and summarize past medical records: yes  Independent visualization of images, tracings, or specimens: yes    Risk of Complications, Morbidity, and/or Mortality  Presenting problems: moderate  Diagnostic procedures: moderate  Management options: moderate    Patient Progress  Patient progress: improved      No orders of the defined types were placed in this encounter. No follow-up provider specified. Neal Ndiaye is a 27 y.o. female who presents to the Emergency Department with chief complaint of  No chief complaint on file. Patient having a headache typical of her usual migraine that she is out of her Maxalt. Review of Systems   Constitutional: Negative. Negative for activity change, appetite change, chills, fatigue and fever. HENT: Negative. Eyes: Negative. Respiratory: Negative. Negative for shortness of breath. Cardiovascular: Negative. Negative for chest pain. Gastrointestinal: Negative. Endocrine: Negative. Musculoskeletal: Negative. Neurological: Negative.     Hematological: Negative. Psychiatric/Behavioral: Negative. All other systems reviewed and are negative. All other systems reviewed and are negative. Past Medical History:   Diagnosis Date    Neurological disorder     Other ill-defined conditions(799.89)     Other ill-defined conditions(799.89)     fibromyalgia    Other ill-defined conditions(799.89)     narcalepsy    PCOS (polycystic ovarian syndrome)         Past Surgical History:   Procedure Laterality Date    CHOLECYSTECTOMY      WISDOM TOOTH EXTRACTION          Family History   Problem Relation Age of Onset    Hypertension Father     Breast Cancer Neg Hx     Ovarian Cancer Neg Hx     Colon Cancer Neg Hx     Diabetes Father            Social Connections:     Frequency of Communication with Friends and Family: Not on file    Frequency of Social Gatherings with Friends and Family: Not on file    Attends Anabaptism Services: Not on file    Active Member of Clubs or Organizations: Not on file    Attends Club or Organization Meetings: Not on file    Marital Status: Not on file        Allergies   Allergen Reactions    Clindamycin Anaphylaxis    Azithromycin Hives    Benzalkonium Chloride Hives    Doxycycline Other (See Comments)     \"hyper\"    Gluten Meal Nausea Only    Pregabalin Other (See Comments)     hallucinations    Epinephrine Nausea And Vomiting     PLEASE REMOVE FROM ALLERGY LIST    Penicillins Rash    Sulfa Antibiotics Rash        Vitals signs and nursing note reviewed. No data found. Physical Exam  Vitals and nursing note reviewed. Constitutional:       Appearance: Normal appearance. She is not ill-appearing. HENT:      Head: Normocephalic and atraumatic. Right Ear: External ear normal.      Left Ear: External ear normal.      Nose: Nose normal.      Mouth/Throat:      Mouth: Mucous membranes are moist.   Eyes:      Extraocular Movements: Extraocular movements intact.       Conjunctiva/sclera: Conjunctivae normal. Pupils: Pupils are equal, round, and reactive to light. Cardiovascular:      Rate and Rhythm: Normal rate and regular rhythm. Pulses: Normal pulses. Heart sounds: Normal heart sounds. Pulmonary:      Effort: Pulmonary effort is normal. No respiratory distress. Breath sounds: Normal breath sounds. Abdominal:      General: Bowel sounds are normal. There is no distension. Palpations: Abdomen is soft. Musculoskeletal:         General: No swelling or signs of injury. Normal range of motion. Cervical back: Normal range of motion. No rigidity. Skin:     General: Skin is warm and dry. Capillary Refill: Capillary refill takes less than 2 seconds. Neurological:      General: No focal deficit present. Mental Status: She is alert and oriented to person, place, and time. Mental status is at baseline. Psychiatric:         Mood and Affect: Mood normal.         Behavior: Behavior normal.         Thought Content: Thought content normal.         Judgment: Judgment normal.          Procedures    Labs Reviewed - No data to display     No orders to display                                    CIWA Assessment  BP: 108/63                       Voice dictation software was used during the making of this note. This software is not perfect and grammatical and other typographical errors may be present. This note has not been completely proofread for errors.        Kingsley Ramon MD  05/24/22 7198

## 2022-06-08 NOTE — ED PROVIDER NOTES
26-year-old female complaining of muscle twitching. Patient has a history of fibromyalgia neurological disorders ill-defined narcolepsy and polycystic ovarian syndrome. Patient states has had this twitching before in the past.    The history is provided by the patient. Seizure   This is a new problem. The problem has not changed since onset. There was return to baseline postseizure. The seizure(s) had no focality. Past Medical History:   Diagnosis Date    Neurological disorder     Other ill-defined conditions(799.89)     narcalepsy    Other ill-defined conditions(799.89)     fibromyalgia    Other ill-defined conditions(799.89)     PCOS (polycystic ovarian syndrome)        Past Surgical History:   Procedure Laterality Date    HX CHOLECYSTECTOMY      HX WISDOM TEETH EXTRACTION           Family History:   Problem Relation Age of Onset    Diabetes Father     Hypertension Father     Breast Cancer Neg Hx     Ovarian Cancer Neg Hx     Colon Cancer Neg Hx        Social History     Socioeconomic History    Marital status: SINGLE     Spouse name: Not on file    Number of children: Not on file    Years of education: Not on file    Highest education level: Not on file   Occupational History    Not on file   Tobacco Use    Smoking status: Never Smoker    Smokeless tobacco: Never Used   Substance and Sexual Activity    Alcohol use: No    Drug use: No    Sexual activity: Not Currently   Other Topics Concern    Not on file   Social History Narrative    Not on file     Social Determinants of Health     Financial Resource Strain:     Difficulty of Paying Living Expenses: Not on file   Food Insecurity:     Worried About Running Out of Food in the Last Year: Not on file    Gracie of Food in the Last Year: Not on file   Transportation Needs:     Lack of Transportation (Medical): Not on file    Lack of Transportation (Non-Medical):  Not on file   Physical Activity:     Days of Exercise per Week: Not on file    Minutes of Exercise per Session: Not on file   Stress:     Feeling of Stress : Not on file   Social Connections:     Frequency of Communication with Friends and Family: Not on file    Frequency of Social Gatherings with Friends and Family: Not on file    Attends Roman Catholic Services: Not on file    Active Member of Clubs or Organizations: Not on file    Attends Club or Organization Meetings: Not on file    Marital Status: Not on file   Intimate Partner Violence:     Fear of Current or Ex-Partner: Not on file    Emotionally Abused: Not on file    Physically Abused: Not on file    Sexually Abused: Not on file   Housing Stability:     Unable to Pay for Housing in the Last Year: Not on file    Number of [place] in the Last Year: Not on file    Unstable Housing in the Last Year: Not on file         ALLERGIES: Latex, natural rubber; Clindamycin; Amoxicillin; Doxycycline; Epinephrine; Flagyl [metronidazole]; Gluten; Lyrica [pregabalin]; Neosporin [benzalkonium chloride]; Penicillins; Sulfa (sulfonamide antibiotics); and Zithromax [azithromycin]    Review of Systems   Constitutional: Negative. Negative for activity change. HENT: Negative. Eyes: Negative. Respiratory: Negative. Cardiovascular: Negative. Gastrointestinal: Negative. Genitourinary: Negative. Musculoskeletal: Negative. Skin: Negative. Neurological: Negative. Psychiatric/Behavioral: Negative. All other systems reviewed and are negative. Vitals:    05/20/22 2133 05/20/22 2332   BP: 111/87    Pulse: 89    Resp: 16    Temp: 98.7 °F (37.1 °C)    SpO2: 98% 98%   Weight: 127 kg (280 lb)    Height: 5' 8\" (1.727 m)             Physical Exam  Vitals and nursing note reviewed. Constitutional:       General: She is not in acute distress. Appearance: She is well-developed. HENT:      Head: Normocephalic and atraumatic.       Right Ear: External ear normal.      Left Ear: External ear normal. Nose: Nose normal.   Eyes:      General: No scleral icterus. Right eye: No discharge. Left eye: No discharge. Conjunctiva/sclera: Conjunctivae normal.      Pupils: Pupils are equal, round, and reactive to light. Cardiovascular:      Rate and Rhythm: Regular rhythm. Pulmonary:      Effort: Pulmonary effort is normal. No respiratory distress. Breath sounds: Normal breath sounds. No stridor. No wheezing or rales. Abdominal:      General: Bowel sounds are normal. There is no distension. Palpations: Abdomen is soft. Tenderness: There is no abdominal tenderness. Musculoskeletal:         General: Normal range of motion. Cervical back: Normal range of motion. Skin:     General: Skin is warm and dry. Findings: No rash. Neurological:      Mental Status: She is alert and oriented to person, place, and time. Motor: No abnormal muscle tone. Coordination: Coordination normal.   Psychiatric:         Behavior: Behavior normal.          MDM  Number of Diagnoses or Management Options  Muscle twitching  Diagnosis management comments: His laboratory data is reassuring no twitching or seizure activity is noted emerged part of the description of her twitching does not sound like a focal motor seizure her labs are normal.  I will follow-up with the primary care doctor for referral to neurology if necessary.        Amount and/or Complexity of Data Reviewed  Clinical lab tests: ordered and reviewed  Tests in the medicine section of CPT®: ordered and reviewed  Decide to obtain previous medical records or to obtain history from someone other than the patient: yes  Review and summarize past medical records: yes  Independent visualization of images, tracings, or specimens: yes    Risk of Complications, Morbidity, and/or Mortality  Presenting problems: high  Diagnostic procedures: high  Management options: high           Procedures

## 2022-07-20 ENCOUNTER — APPOINTMENT (OUTPATIENT)
Dept: GENERAL RADIOLOGY | Age: 32
End: 2022-07-20

## 2022-07-20 ENCOUNTER — HOSPITAL ENCOUNTER (EMERGENCY)
Age: 32
Discharge: HOME | End: 2022-07-20
Attending: EMERGENCY MEDICINE | Admitting: EMERGENCY MEDICINE

## 2022-07-20 VITALS
BODY MASS INDEX: 42.44 KG/M2 | WEIGHT: 280 LBS | HEART RATE: 96 BPM | RESPIRATION RATE: 20 BRPM | DIASTOLIC BLOOD PRESSURE: 91 MMHG | TEMPERATURE: 98.7 F | SYSTOLIC BLOOD PRESSURE: 124 MMHG | HEIGHT: 68 IN | OXYGEN SATURATION: 98 %

## 2022-07-20 DIAGNOSIS — U07.1 COVID-19: Primary | ICD-10-CM

## 2022-07-20 LAB
ALBUMIN SERPL-MCNC: 3.7 G/DL (ref 3.5–5)
ALBUMIN/GLOB SERPL: 0.9 {RATIO} (ref 1.2–3.5)
ALP SERPL-CCNC: 87 U/L (ref 50–136)
ALT SERPL-CCNC: 31 U/L (ref 12–65)
ANION GAP SERPL CALC-SCNC: 6 MMOL/L (ref 7–16)
AST SERPL-CCNC: 13 U/L (ref 15–37)
BILIRUB SERPL-MCNC: 0.2 MG/DL (ref 0.2–1.1)
BUN SERPL-MCNC: 12 MG/DL (ref 6–23)
CALCIUM SERPL-MCNC: 9.8 MG/DL (ref 8.3–10.4)
CHLORIDE SERPL-SCNC: 110 MMOL/L (ref 98–107)
CO2 SERPL-SCNC: 25 MMOL/L (ref 21–32)
CREAT SERPL-MCNC: 1 MG/DL (ref 0.6–1)
D DIMER PPP FEU-MCNC: 0.51 UG/ML(FEU)
ERYTHROCYTE [DISTWIDTH] IN BLOOD BY AUTOMATED COUNT: 11.9 % (ref 11.9–14.6)
GLOBULIN SER CALC-MCNC: 4 G/DL (ref 2.3–3.5)
GLUCOSE SERPL-MCNC: 147 MG/DL (ref 65–100)
HCT VFR BLD AUTO: 42.1 % (ref 35.8–46.3)
HGB BLD-MCNC: 14 G/DL (ref 11.7–15.4)
MCH RBC QN AUTO: 29.7 PG (ref 26.1–32.9)
MCHC RBC AUTO-ENTMCNC: 33.3 G/DL (ref 31.4–35)
MCV RBC AUTO: 89.2 FL (ref 79.6–97.8)
NRBC # BLD: 0 K/UL (ref 0–0.2)
PLATELET # BLD AUTO: 306 K/UL (ref 150–450)
PMV BLD AUTO: 9.5 FL (ref 9.4–12.3)
POTASSIUM SERPL-SCNC: 3.8 MMOL/L (ref 3.5–5.1)
PROT SERPL-MCNC: 7.7 G/DL (ref 6.3–8.2)
RBC # BLD AUTO: 4.72 M/UL (ref 4.05–5.2)
SODIUM SERPL-SCNC: 141 MMOL/L (ref 136–145)
TROPONIN I SERPL HS-MCNC: 4.8 PG/ML (ref 0–14)
WBC # BLD AUTO: 4.8 K/UL (ref 4.3–11.1)

## 2022-07-20 PROCEDURE — 99285 EMERGENCY DEPT VISIT HI MDM: CPT

## 2022-07-20 PROCEDURE — 2580000003 HC RX 258: Performed by: PHYSICIAN ASSISTANT

## 2022-07-20 PROCEDURE — 85027 COMPLETE CBC AUTOMATED: CPT

## 2022-07-20 PROCEDURE — 84484 ASSAY OF TROPONIN QUANT: CPT

## 2022-07-20 PROCEDURE — 6370000000 HC RX 637 (ALT 250 FOR IP): Performed by: PHYSICIAN ASSISTANT

## 2022-07-20 PROCEDURE — 71046 X-RAY EXAM CHEST 2 VIEWS: CPT

## 2022-07-20 PROCEDURE — 85379 FIBRIN DEGRADATION QUANT: CPT

## 2022-07-20 PROCEDURE — 93005 ELECTROCARDIOGRAM TRACING: CPT | Performed by: EMERGENCY MEDICINE

## 2022-07-20 PROCEDURE — 80053 COMPREHEN METABOLIC PANEL: CPT

## 2022-07-20 RX ORDER — 0.9 % SODIUM CHLORIDE 0.9 %
1000 INTRAVENOUS SOLUTION INTRAVENOUS
Status: COMPLETED | OUTPATIENT
Start: 2022-07-20 | End: 2022-07-20

## 2022-07-20 RX ORDER — BENZONATATE 100 MG/1
100 CAPSULE ORAL 2 TIMES DAILY PRN
Qty: 14 CAPSULE | Refills: 0 | Status: SHIPPED | OUTPATIENT
Start: 2022-07-20 | End: 2022-07-27

## 2022-07-20 RX ORDER — BENZONATATE 100 MG/1
100 CAPSULE ORAL
Status: COMPLETED | OUTPATIENT
Start: 2022-07-20 | End: 2022-07-20

## 2022-07-20 RX ADMIN — BENZONATATE 100 MG: 100 CAPSULE ORAL at 18:40

## 2022-07-20 RX ADMIN — SODIUM CHLORIDE 1000 ML: 9 INJECTION, SOLUTION INTRAVENOUS at 18:38

## 2022-07-20 ASSESSMENT — ENCOUNTER SYMPTOMS
VOMITING: 0
SORE THROAT: 0
NAUSEA: 0
CHEST TIGHTNESS: 1
SHORTNESS OF BREATH: 1
COUGH: 1

## 2022-07-20 ASSESSMENT — PAIN SCALES - GENERAL: PAINLEVEL_OUTOF10: 4

## 2022-07-20 ASSESSMENT — PAIN DESCRIPTION - LOCATION: LOCATION: CHEST;THROAT

## 2022-07-20 ASSESSMENT — PAIN - FUNCTIONAL ASSESSMENT: PAIN_FUNCTIONAL_ASSESSMENT: 0-10

## 2022-07-20 NOTE — ED PROVIDER NOTES
[employer] Emergency Department Provider Note                   PCP:                None Provider               Age: 27 y.o. Sex: female       ICD-10-CM    1. COVID-19  U5.3           DISPOSITION Decision To Discharge 07/20/2022 08:14:50 PM       MDM  Number of Diagnoses or Management Options  COVID-19  Diagnosis management comments: Ddx: [id number], hypoxia, acute respiratory distress, pneumonia, PE, ACS, arrhythmia, bronchitis, pleural effusion    Overall patient is a well-appearing 68-year-old female who presents today for evaluation of COVID-19 symptoms and chest tightness. Patient's vital signs are stable, she was tachycardic initially in triage, but this improved with IV fluids. EKG shows normal sinus rhythm no ST changes, troponin is normal.  D-dimer is also negative. Chest x-ray clear with no evidence of pneumonia. Discussed with patient symptoms likely due to uncomplicated CAYOZ-83 infection at this point, we will treat her cough, we discussed red flag symptoms that require emergent reevaluation. Amount and/or Complexity of Data Reviewed  Clinical lab tests: ordered and reviewed  Tests in the radiology section of CPT®: ordered and reviewed  Tests in the medicine section of CPT®: ordered    Patient Progress  Patient progress: improved       Orders Placed This Encounter   Procedures    XR CHEST (2 VW)    CBC    Comprehensive Metabolic Panel    D-Dimer, Quantitative    Troponin    EKG 12 Lead        Trista Garces is a 27 y.o. female who presents to the Emergency Department with chief complaint of    Chief Complaint   Patient presents with    Positive For Covid-23     68-year-old well-appearing female presents today for evaluation of COVID-19 symptoms. Her symptoms began about 8 days ago and have been progressively worsening. She states that she had a COVID test performed 4 days ago that was positive. She reports that her symptoms started out with headache, sore throat, body aches and fever. She states that the fever and body aches have improved but she continues to have mild sore throat, productive cough, chest tightness. She reports some mild exertional dyspnea as well. She reports some pain with a deep breath and states it feels like a burning pain in her chest when she takes a deep breath. She states that a deep breath also triggers her cough. She states her cough has been productive of thick white sputum. She denies any abdominal pain, nausea or vomiting, reports intermittent episodes of diarrhea but overall states that this is mild. She denies any known sick contacts, she did receive her COVID-19 vaccine. She denies any recent hospitalizations or surgeries. She does report a remote history of DVT in her upper extremity about 2 years ago secondary to infected IV line and sepsis. He has had no issues since then, is not currently on any blood thinners. She denies any concern for pregnancy, is not on any oral contraceptives or other hormones. The history is provided by the patient. No  was used. All other systems reviewed and are negative. Review of Systems   Constitutional:  Positive for fatigue. Negative for activity change, chills and fever. HENT:  Negative for congestion and sore throat. Respiratory:  Positive for cough, chest tightness and shortness of breath. Cardiovascular:  Negative for palpitations. Gastrointestinal:  Negative for nausea and vomiting. Musculoskeletal:  Negative for arthralgias. Neurological:  Negative for dizziness and headaches.      Past Medical History:   Diagnosis Date    Neurological disorder     Other ill-defined conditions(019.89)     Other ill-defined conditions(799.89)     fibromyalgia    Other ill-defined conditions(799.89)     narcalepsy    PCOS (polycystic ovarian syndrome)         Past Surgical History:   Procedure Laterality Date    CHOLECYSTECTOMY      WISDOM TOOTH EXTRACTION          Family History   Problem Relation Age of Onset    Hypertension Father     Breast Cancer Neg Hx     Ovarian Cancer Neg Hx     Colon Cancer Neg Hx     Diabetes Father         Social History     Socioeconomic History    Marital status: Single   Tobacco Use    Smoking status: Never    Smokeless tobacco: Never   Substance and Sexual Activity    Alcohol use: No    Drug use: No        Allergies: Clindamycin, Azithromycin, Benzalkonium chloride, Doxycycline, Gluten meal, Pregabalin, Epinephrine, Penicillins, and Sulfa antibiotics    Previous Medications    CETIRIZINE (ZYRTEC) 10 MG TABLET    Take 10 mg by mouth 2 times daily    EPINEPHRINE (EPIPEN) 0.3 MG/0.3ML SOAJ INJECTION    Inject 0.3 mg into the muscle once as needed    HYDROXYZINE (VISTARIL) 50 MG CAPSULE    Take 50 mg by mouth    IBUPROFEN (ADVIL;MOTRIN) 800 MG TABLET    Take 800 mg by mouth every 8 hours as needed    MAGNESIUM OXIDE 500 MG TABS    Take by mouth    OXCARBAZEPINE (TRILEPTAL) 150 MG TABLET    Take 200 mg by mouth    PSEUDOEPHEDRINE (SUDAFED) 30 MG TABLET    Take 60 mg by mouth 2 times daily    SUMATRIPTAN (IMITREX) 100 MG TABLET    Take 100 mg by mouth 2 times daily as needed    TOPIRAMATE (TOPAMAX) 100 MG TABLET    Take 100 mg by mouth 2 times daily    TOPIRAMATE (TOPAMAX) 200 MG TABLET    Take 200 mg by mouth    ZIPRASIDONE (GEODON) 40 MG CAPSULE    Take 40 mg by mouth    ZIPRASIDONE (GEODON) 60 MG CAPSULE    Take 60 mg by mouth        Vitals signs and nursing note reviewed. Patient Vitals for the past 4 hrs:   Temp Pulse Resp BP SpO2   07/20/22 1900 -- 96 -- -- --   07/20/22 1845 -- 100 -- -- --   07/20/22 1843 -- -- -- (!) 122/91 95 %   07/20/22 1816 -- (!) 116 -- 130/86 --   07/20/22 1813 -- -- -- 130/86 --   07/20/22 1749 98.7 °F (37.1 °C) (!) 123 20 (!) 146/93 97 %          Physical Exam  Vitals and nursing note reviewed. Constitutional:       General: She is not in acute distress. Appearance: Normal appearance. HENT:      Head: Normocephalic and atraumatic. Right Ear: Tympanic membrane and ear canal normal.      Left Ear: Tympanic membrane and ear canal normal.      Mouth/Throat:      Mouth: Mucous membranes are moist.      Pharynx: Oropharynx is clear. No oropharyngeal exudate. Eyes:      Conjunctiva/sclera: Conjunctivae normal.   Cardiovascular:      Rate and Rhythm: Regular rhythm. Tachycardia present. Heart sounds: No friction rub. No gallop. Pulmonary:      Effort: Pulmonary effort is normal.      Breath sounds: No wheezing, rhonchi or rales. Comments: Coughing throughout exam  Abdominal:      Palpations: Abdomen is soft. Tenderness: There is no abdominal tenderness. There is no guarding or rebound. Skin:     General: Skin is warm and dry. Capillary Refill: Capillary refill takes less than 2 seconds. Neurological:      General: No focal deficit present. Mental Status: She is alert and oriented to person, place, and time. Psychiatric:         Mood and Affect: Mood normal.         Thought Content: Thought content normal.         Judgment: Judgment normal.        EKG 12 Lead    Date/Time: 7/20/2022 8:29 PM  Performed by: MARCIAL Spencer  Authorized by: Alyce Koch MD     Previous ECG:     Previous ECG:  Compared to current    Similarity:  Changes noted    Comparison ECG info:   Tachycardia now present  Interpretation:     Interpretation: abnormal      Details:  Sinus tachycardia  Rate:     ECG rate:  108    ECG rate assessment: tachycardic    Rhythm:     Rhythm: sinus rhythm    QRS:     QRS axis:  Normal  ST segments:     ST segments:  Normal  T waves:     T waves: normal      Labs Reviewed   COMPREHENSIVE METABOLIC PANEL - Abnormal; Notable for the following components:       Result Value    Chloride 110 (*)     Anion Gap 6 (*)     Glucose 147 (*)     AST 13 (*)     Globulin 4.0 (*)     Albumin/Globulin Ratio 0.9 (*)     All other components within normal limits   CBC   D-DIMER, QUANTITATIVE   TROPONIN XR CHEST (2 VW)   Final Result   No consolidation. ED Course as of 07/20/22 2030 Wed Jul 20, 2022   1845 FINDINGS:  The heart size is within normal limits. There is no lobar  consolidation, pleural effusions or pulmonary edema. IMPRESSION:  No consolidation. [KE]   2013 D-Dimer, Quant: 0.51 [KE]      ED Course User Index  [KE] MARCIAL Infante        Voice dictation software was used during the making of this note. This software is not perfect and grammatical and other typographical errors may be present. This note has not been completely proofread for errors.         Isabela Infante  07/20/22 2030

## 2022-07-20 NOTE — ED NOTES
Reprots positive COVID test last Thursday. Reports ongoing fatigue, sore throat, cough,  shob, pain when breathing in.       Susanne Roach RN  07/20/22 Amaya Sosa RN  07/20/22 [id number]

## 2022-07-21 ENCOUNTER — CARE COORDINATION (OUTPATIENT)
Dept: OTHER | Facility: CLINIC | Age: 32
End: 2022-07-21

## 2022-07-21 NOTE — ED NOTES
I have reviewed discharge instructions with the patient. The patient verbalized understanding. Patient left ED via Discharge Method: ambulatory to Home with mother. Opportunity for questions and clarification provided. Patient given 1 scripts. To continue your aftercare when you leave the hospital, you may receive an automated call from our care team to check in on how you are doing.  This is a free service and part of our promise to provide the best care and service to meet your aftercare needs. \" If you have questions, or wish to unsubscribe from this service please call [phone].  Thank you for Choosing our [facility] Emergency Department.         Alcon Martinez RN  07/20/22 2344

## 2022-07-21 NOTE — DISCHARGE INSTRUCTIONS
Use tessalon perles as prescribed for cough     Follow-up with your PCP in 1 to 2 days if no improvement. Return to the ER for any new or worsening symptoms.

## 2022-07-21 NOTE — CARE COORDINATION
Care Transitions Outreach Attempt    Call within 2 business days of discharge: Yes   Attempted to reach patient for transitions of care follow up. Unable to reach patient. Patient: Francheska Nolan Patient : 1990 MRN: [id number]    Last Discharge [facility]       Date Complaint Diagnosis Description Type Department Provider    22 Positive For Covid-19 COVID-19 ED (DISCHARGE) [facility] Brandi Flores MD              Was this an external facility discharge? No Discharge Facility: N/A    Noted following upcoming appointments from discharge chart review:   [facility] follow up appointment(s): No future appointments.   Non-[facility] follow up appointment(s):

## 2022-07-22 ENCOUNTER — CARE COORDINATION (OUTPATIENT)
Dept: OTHER | Facility: CLINIC | Age: 32
End: 2022-07-22

## 2022-07-22 NOTE — CARE COORDINATION
Care Transitions Outreach Attempt    Call within 2 business days of discharge: Yes   Attempted to reach patient for transitions of care follow up. Unable to reach patient. Patient: Ibrahima Escalera Patient : 1990 MRN: [id number]    Last Discharge [facility]       Date Complaint Diagnosis Description Type Department Provider    22 Positive For Covid-19 COVID-19 ED (DISCHARGE) [facility] Brandi Fletcher MD              Was this an external facility discharge? No Discharge Facility: N/A    Noted following upcoming appointments from discharge chart review:   [facility] No future appointments.   Non-[facility] follow up appointment(s):

## 2022-07-25 ENCOUNTER — CARE COORDINATION (OUTPATIENT)
Dept: OTHER | Facility: CLINIC | Age: 32
End: 2022-07-25

## 2022-11-06 ENCOUNTER — APPOINTMENT (OUTPATIENT)
Dept: CT IMAGING | Age: 32
End: 2022-11-06

## 2022-11-06 ENCOUNTER — HOSPITAL ENCOUNTER (EMERGENCY)
Age: 32
Discharge: HOME | End: 2022-11-06
Attending: EMERGENCY MEDICINE

## 2022-11-06 VITALS
WEIGHT: 280 LBS | BODY MASS INDEX: 42.44 KG/M2 | HEIGHT: 68 IN | OXYGEN SATURATION: 94 % | RESPIRATION RATE: 16 BRPM | DIASTOLIC BLOOD PRESSURE: 89 MMHG | HEART RATE: 87 BPM | TEMPERATURE: 98.1 F | SYSTOLIC BLOOD PRESSURE: 149 MMHG

## 2022-11-06 DIAGNOSIS — S05.01XA ABRASION OF RIGHT CORNEA, INITIAL ENCOUNTER: Primary | ICD-10-CM

## 2022-11-06 PROCEDURE — 70450 CT HEAD/BRAIN W/O DYE: CPT

## 2022-11-06 PROCEDURE — 99284 EMERGENCY DEPT VISIT MOD MDM: CPT

## 2022-11-06 PROCEDURE — 6370000000 HC RX 637 (ALT 250 FOR IP)

## 2022-11-06 RX ORDER — TETRACAINE HYDROCHLORIDE 5 MG/ML
1 SOLUTION OPHTHALMIC
Status: COMPLETED | OUTPATIENT
Start: 2022-11-06 | End: 2022-11-06

## 2022-11-06 RX ADMIN — TETRACAINE HYDROCHLORIDE 1 DROP: 5 SOLUTION OPHTHALMIC at 20:15

## 2022-11-06 RX ADMIN — FLUORESCEIN SODIUM 1 MG: 1 STRIP OPHTHALMIC at 20:16

## 2022-11-06 ASSESSMENT — PAIN - FUNCTIONAL ASSESSMENT: PAIN_FUNCTIONAL_ASSESSMENT: 0-10

## 2022-11-06 ASSESSMENT — ENCOUNTER SYMPTOMS
VOMITING: 0
NAUSEA: 0
EYE PAIN: 1
ABDOMINAL PAIN: 0
DIARRHEA: 0
COLOR CHANGE: 0
SHORTNESS OF BREATH: 0
EYE ITCHING: 1

## 2022-11-06 ASSESSMENT — PAIN DESCRIPTION - ORIENTATION: ORIENTATION: RIGHT

## 2022-11-06 ASSESSMENT — PAIN DESCRIPTION - DESCRIPTORS: DESCRIPTORS: ACHING;STABBING

## 2022-11-06 ASSESSMENT — TONOMETRY
OS_IOP_MMHG: 9
IOP_HANDHELD: 1
OD_IOP_MMHG: 10

## 2022-11-06 ASSESSMENT — PAIN SCALES - GENERAL: PAINLEVEL_OUTOF10: 4

## 2022-11-06 ASSESSMENT — VISUAL ACUITY: OU: 1

## 2022-11-06 ASSESSMENT — PAIN DESCRIPTION - LOCATION: LOCATION: EYE

## 2022-11-06 NOTE — ED TRIAGE NOTES
Pt reports feeling right eye pain around 3 pm.  Pt has flushed eye with no relief. Pt reports blurry vision and a stabbing, aching pain.

## 2022-11-06 NOTE — ED PROVIDER NOTES
Emergency Department Provider Note                   PCP:                Md Debbie Hawkins               Age: 27 y.o. Sex: female       ICD-10-CM    1. Abrasion of right cornea, initial encounter  S05. 01XA ciprofloxacin HCl (CILOXAN) 0.3 % ophthalmic ointment     DISCONTINUED: ciprofloxacin HCl (CILOXAN) 0.3 % ophthalmic ointment          DISPOSITION Decision To Discharge 11/06/2022 08:11:24 PM        MDM  Number of Diagnoses or Management Options  Abrasion of right cornea, initial encounter  Diagnosis management comments: Vital signs reviewed, patient stable, NAD, afebrile, nontoxic in appearance     Patient states that she has a headache on the right side of her head that she has had since around 2:00 or 3:00 around the same time that she felt the scratching on her right eyeball. Patient does have a history of migraines. Will obtain CT head without contrast    Will check pressures and eyes, will stain with fluorescein and no evaluate for possible corneal abrasion    On physical exam pupils are equal and reactive. No findings on neuro exam without stated very mild decrease in sensation to touch in anterior right thigh versus left thigh. Bilateral motor strength is 5+ and equal.  No decrease in sensation in right lower leg below the knee or in the foot. Bilateral upper extremity motor strength is 5+ and equal in bilateral upper extremity sensation is equal.  Rest of patient's physical exam is benign. Normal pressures bilaterally  Small amount of fluorescein uptake at the 12 o'clock position on right cornea consistent with corneal abrasion. No contact lens appreciated or foreign body appreciated with eversion of upper and lower eyelids. Conjunctive are not injected, sclera are white. Rest of patient's physical exam is reassuring. Based on history, physical exam, I do not feel any further imaging or any lab work is warranted at this time.   Patient is stable and can be discharged home with follow-up to primary care and her ophthalmologist or optometrist.    I discussed physical exam findings, laboratory and/or imaging findings, treatment and follow-up with the patient and those who were present. I answered any questions they had. They verbalized that they understood and were in agreement with treatment and disposition. I discussed signs and symptoms that would warrant a prompt return to the emergency department with the patient. I included the signs and symptoms on discharge paperwork. Patient verbalized that they understood. Discharged home in stable condition with prescription for ciprofloxacin ointment. Patient appears to be allergic to an ingredient within ciprofloxacin drops. She is to cease wearing contacts until antibiotic treatment is completed. Reiterated strict return to ED precautions. Discussed this patient with my attending, Dr. Pati Hyde, who is in agreement with treatment and disposition.            Amount and/or Complexity of Data Reviewed  Tests in the radiology section of CPT®: ordered and reviewed  Review and summarize past medical records: yes  Independent visualization of images, tracings, or specimens: yes (Independent visualization of imaging)    Risk of Complications, Morbidity, and/or Mortality  Presenting problems: low  Diagnostic procedures: moderate  Management options: low    Patient Progress  Patient progress: stable       ED Course as of 11/06/22 2049   Nena Bruner Nov 06, 2022   1849 Visual acuity  Left eye 20/50  Right eye 20/70,   bilaterally 20/70 [JG]      ED Course User Index  [JG] MARCIAL Castillo        Orders Placed This Encounter   Procedures    CT HEAD WO CONTRAST    Visual acuity screening        Medications   tetracaine (TETRAVISC) 0.5 % ophthalmic solution 1 drop (1 drop Right Eye Given 11/6/22 2015)   fluorescein ophthalmic strip 1 mg (1 mg Right Eye Given 11/6/22 2016)       Discharge Medication List as of 11/6/2022  8:15 PM           Gary Rao is a 27 y.o. female who presents to the Emergency Department with chief complaint of    Chief Complaint   Patient presents with    Eye Problem      24-year-old female with history of bipolar 1 disorder, status migrainosus, fibromyalgia, narcolepsy, neurological disorder, PCOS presents emergency department today with chief complaint of blurriness to vision and right eye with sensation that there is something in her right eyeball. Patient states she wears contacts and she thinks that maybe her contact fell out but she is unsure. Patient states it feels like something is scratching her eyeball. Patient took out her left contact and has been wearing her corrective glasses today. Patient states eye discomfort began around 2:00 today. Patient states that perhaps around 3:00 she developed a right-sided headache. Patient denies chest pain, shortness of breath, nausea, vomiting, diarrhea, changes to balance, changes to vision, changes to speech. The history is provided by the patient. No  was used. Review of Systems   Constitutional:  Negative for chills, fatigue and fever. Eyes:  Positive for pain, itching and visual disturbance. Respiratory:  Negative for shortness of breath. Cardiovascular:  Negative for chest pain and palpitations. Gastrointestinal:  Negative for abdominal pain, diarrhea, nausea and vomiting. Skin:  Negative for color change. Neurological:  Negative for weakness and headaches. All other systems reviewed and are negative.     Past Medical History:   Diagnosis Date    Neurological disorder     Other ill-defined conditions(799.89)     Other ill-defined conditions(799.89)     fibromyalgia    Other ill-defined conditions(799.89)     narcalepsy    PCOS (polycystic ovarian syndrome)         Past Surgical History:   Procedure Laterality Date    CHOLECYSTECTOMY      WISDOM TOOTH EXTRACTION          Family History   Problem Relation Age of Onset    Hypertension Father     Breast Cancer Neg Hx     Ovarian Cancer Neg Hx     Colon Cancer Neg Hx     Diabetes Father         Social History     Socioeconomic History    Marital status: Single   Tobacco Use    Smoking status: Never    Smokeless tobacco: Never   Substance and Sexual Activity    Alcohol use: No    Drug use: No         Clindamycin, Azithromycin, Benzalkonium chloride, Doxycycline, Pregabalin, Penicillins, and Sulfa antibiotics     Discharge Medication List as of 11/6/2022  8:15 PM        CONTINUE these medications which have NOT CHANGED    Details   cetirizine (ZYRTEC) 10 MG tablet Take 10 mg by mouth 2 times dailyHistorical Med      EPINEPHrine (EPIPEN) 0.3 MG/0.3ML SOAJ injection Inject 0.3 mg into the muscle once as neededHistorical Med      hydrOXYzine (VISTARIL) 50 MG capsule Take 50 mg by mouthHistorical Med      ibuprofen (ADVIL;MOTRIN) 800 MG tablet Take 800 mg by mouth every 8 hours as neededHistorical Med      Magnesium Oxide 500 MG TABS Take by mouthHistorical Med      OXcarbazepine (TRILEPTAL) 150 MG tablet Take 200 mg by mouthHistorical Med      pseudoephedrine (SUDAFED) 30 MG tablet Take 60 mg by mouth 2 times dailyHistorical Med      SUMAtriptan (IMITREX) 100 MG tablet Take 100 mg by mouth 2 times daily as neededHistorical Med      !! topiramate (TOPAMAX) 100 MG tablet Take 100 mg by mouth 2 times dailyHistorical Med      !! topiramate (TOPAMAX) 200 MG tablet Take 200 mg by mouthHistorical Med      ziprasidone (GEODON) 40 MG capsule Take 40 mg by mouthHistorical Med       !! - Potential duplicate medications found. Please discuss with provider. Vitals signs and nursing note reviewed. Patient Vitals for the past 4 hrs:   Temp Pulse Resp BP SpO2   11/06/22 1839 98.1 °F (36.7 °C) 87 16 (!) 149/89 94 %          Physical Exam  Vitals and nursing note reviewed. Constitutional:       General: She is not in acute distress. Appearance: Normal appearance. She is obese.  She is not ill-appearing, toxic-appearing or diaphoretic. HENT:      Head: Normocephalic and atraumatic. Right Ear: Tympanic membrane, ear canal and external ear normal.      Left Ear: Tympanic membrane, ear canal and external ear normal.      Nose: Nose normal.      Mouth/Throat:      Mouth: Mucous membranes are moist.      Pharynx: Oropharynx is clear. Eyes:      General: Lids are normal. Lids are everted, no foreign bodies appreciated. Vision grossly intact. Gaze aligned appropriately. No allergic shiner, visual field deficit or scleral icterus. Right eye: No foreign body or discharge. Left eye: No discharge. Intraocular pressure: Right eye pressure is 10 mmHg. Left eye pressure is 9 mmHg. Measurements were taken using a handheld tonometer. Extraocular Movements: Extraocular movements intact. Right eye: Normal extraocular motion and no nystagmus. Left eye: Normal extraocular motion and no nystagmus. Conjunctiva/sclera: Conjunctivae normal.      Right eye: Right conjunctiva is not injected. No chemosis, exudate or hemorrhage. Left eye: Left conjunctiva is not injected. No chemosis, exudate or hemorrhage. Pupils: Pupils are equal, round, and reactive to light. Pupils are equal.      Right eye: Pupil is round, reactive and not sluggish. Corneal abrasion (Very small abrasion located at the 12 o'clock position) and fluorescein uptake (Increased fluorescein uptake at abrasion at 12 o'clock position) present. Left eye: Pupil is round, reactive and not sluggish. Comments: No Periorbital erythema, no periorbital edema, no periorbital tenderness to palpation   Cardiovascular:      Rate and Rhythm: Normal rate. Pulses: Normal pulses. Heart sounds: Normal heart sounds. Pulmonary:      Effort: Pulmonary effort is normal.      Breath sounds: Normal breath sounds. Abdominal:      General: Bowel sounds are normal.   Musculoskeletal:         General: Normal range of motion. Cervical back: Normal range of motion and neck supple. Lymphadenopathy:      Cervical: No cervical adenopathy. Skin:     General: Skin is warm and dry. Capillary Refill: Capillary refill takes less than 2 seconds. Coloration: Skin is not jaundiced or pale. Findings: No bruising, erythema, lesion or rash. Neurological:      General: No focal deficit present. Mental Status: She is alert and oriented to person, place, and time. GCS: GCS eye subscore is 4. GCS verbal subscore is 5. GCS motor subscore is 6. Cranial Nerves: Cranial nerves 2-12 are intact. No cranial nerve deficit, dysarthria or facial asymmetry. Sensory: Sensation is intact. Motor: Motor function is intact. No weakness, tremor, atrophy, abnormal muscle tone or pronator drift. Coordination: Coordination is intact. Romberg sign negative. Coordination normal. Finger-Nose-Finger Test normal. Rapid alternating movements normal.      Gait: Gait is intact. Gait (Gait is intact) normal.      Deep Tendon Reflexes:      Reflex Scores:       Bicep reflexes are 2+ on the right side and 2+ on the left side. Achilles reflexes are 2+ on the right side and 2+ on the left side. Comments: Bilateral upper and lower extremity motor strength 5+ and equal  Bilateral upper extremity sensation intact  Patient states very mild decrease sensation in right thigh versus left thigh, no decrease in sensation in right lower leg or right foot, around pelvis   Psychiatric:         Mood and Affect: Mood normal.         Behavior: Behavior normal.         Thought Content: Thought content normal.         Judgment: Judgment normal.        Procedures    Results for orders placed or performed during the hospital encounter of 11/06/22   CT HEAD WO CONTRAST    Narrative    History: right side HA with changes to vision     Exam: CT head without contrast    Technique:  Thin section axial CT images were obtained from the skullbase through  the vertex. Radiation dose reduction techniques were used for this study. Our  CT scanners use one or all of the following: Automated exposure control,  adjustment of the mA and/or kV according to patient size, use of iterative  reconstruction. COMPARISON: 5/21/2022    Findings: The ventricles are normal in size, shape, and position. No abnormal  parenchymal density is present. No extra-axial fluid collection is present. There is no mass or mass-effect. The basilar cisterns are patent. The paranasal  sinuses and mastoid air cells are clear. Impression    Unremarkable CT head without contrast.            CT HEAD WO CONTRAST   Final Result   Unremarkable CT head without contrast.                                Voice dictation software was used during the making of this note. This software is not perfect and grammatical and other typographical errors may be present. This note has not been completely proofread for errors.       Isabela Coffman  11/06/22 5755

## 2022-11-07 NOTE — ED NOTES
I have reviewed discharge instructions with the patient. The patient verbalized understanding. Patient left ED via Discharge Method: ambulatory to Home with family. Opportunity for questions and clarification provided. Patient given 1 scripts. To continue your aftercare when you leave the hospital, you may receive an automated call from our care team to check in on how you are doing. This is a free service and part of our promise to provide the best care and service to meet your aftercare needs.  If you have questions, or wish to unsubscribe from this service please call [phone]. Thank you for Choosing our [address] [place] [address] Emergency Department.         Gabbi Ontiveros RN  11/06/22 2017

## 2023-01-07 PROCEDURE — 96374 THER/PROPH/DIAG INJ IV PUSH: CPT

## 2023-01-07 PROCEDURE — 99285 EMERGENCY DEPT VISIT HI MDM: CPT

## 2023-01-07 PROCEDURE — 96375 TX/PRO/DX INJ NEW DRUG ADDON: CPT

## 2023-01-08 ENCOUNTER — HOSPITAL ENCOUNTER (EMERGENCY)
Dept: GENERAL RADIOLOGY | Age: 33
Discharge: HOME | End: 2023-01-11
Payer: COMMERCIAL

## 2023-01-08 ENCOUNTER — HOSPITAL ENCOUNTER (EMERGENCY)
Age: 33
Discharge: HOME | End: 2023-01-08
Attending: EMERGENCY MEDICINE
Payer: COMMERCIAL

## 2023-01-08 VITALS
SYSTOLIC BLOOD PRESSURE: 119 MMHG | DIASTOLIC BLOOD PRESSURE: 76 MMHG | BODY MASS INDEX: 43.95 KG/M2 | HEIGHT: 68 IN | TEMPERATURE: 98.8 F | HEART RATE: 78 BPM | OXYGEN SATURATION: 97 % | WEIGHT: 290 LBS | RESPIRATION RATE: 17 BRPM

## 2023-01-08 DIAGNOSIS — R07.89 ATYPICAL CHEST PAIN: Primary | ICD-10-CM

## 2023-01-08 LAB
ALBUMIN SERPL-MCNC: 3.8 G/DL (ref 3.5–5)
ALBUMIN/GLOB SERPL: 1.1 (ref 0.4–1.6)
ALP SERPL-CCNC: 80 U/L (ref 50–136)
ALT SERPL-CCNC: 26 U/L (ref 12–65)
ANION GAP SERPL CALC-SCNC: 7 MMOL/L (ref 2–11)
AST SERPL-CCNC: 16 U/L (ref 15–37)
BILIRUB SERPL-MCNC: 0.2 MG/DL (ref 0.2–1.1)
BUN SERPL-MCNC: 14 MG/DL (ref 6–23)
CALCIUM SERPL-MCNC: 9.4 MG/DL (ref 8.3–10.4)
CHLORIDE SERPL-SCNC: 111 MMOL/L (ref 101–110)
CO2 SERPL-SCNC: 24 MMOL/L (ref 21–32)
CREAT SERPL-MCNC: 1 MG/DL (ref 0.6–1)
D DIMER PPP FEU-MCNC: 0.42 UG/ML(FEU)
EKG ATRIAL RATE: 87 BPM
EKG DIAGNOSIS: NORMAL
EKG P AXIS: 49 DEGREES
EKG P-R INTERVAL: 160 MS
EKG Q-T INTERVAL: 360 MS
EKG QRS DURATION: 78 MS
EKG QTC CALCULATION (BAZETT): 433 MS
EKG R AXIS: 8 DEGREES
EKG T AXIS: 35 DEGREES
EKG VENTRICULAR RATE: 87 BPM
ERYTHROCYTE [DISTWIDTH] IN BLOOD BY AUTOMATED COUNT: 12.1 % (ref 11.9–14.6)
GLOBULIN SER CALC-MCNC: 3.6 G/DL (ref 2.8–4.5)
GLUCOSE SERPL-MCNC: 107 MG/DL (ref 65–100)
HCT VFR BLD AUTO: 39.5 % (ref 35.8–46.3)
HGB BLD-MCNC: 13.5 G/DL (ref 11.7–15.4)
LIPASE SERPL-CCNC: 129 U/L (ref 73–393)
MAGNESIUM SERPL-MCNC: 2.4 MG/DL (ref 1.8–2.4)
MCH RBC QN AUTO: 30.1 PG (ref 26.1–32.9)
MCHC RBC AUTO-ENTMCNC: 34.2 G/DL (ref 31.4–35)
MCV RBC AUTO: 88 FL (ref 82–102)
NRBC # BLD: 0 K/UL (ref 0–0.2)
PLATELET # BLD AUTO: 331 K/UL (ref 150–450)
PMV BLD AUTO: 9.5 FL (ref 9.4–12.3)
POTASSIUM SERPL-SCNC: 3.8 MMOL/L (ref 3.5–5.1)
PROT SERPL-MCNC: 7.4 G/DL (ref 6.3–8.2)
RBC # BLD AUTO: 4.49 M/UL (ref 4.05–5.2)
SODIUM SERPL-SCNC: 142 MMOL/L (ref 133–143)
TROPONIN I SERPL HS-MCNC: 4.7 PG/ML (ref 0–14)
TROPONIN I SERPL HS-MCNC: 5.1 PG/ML (ref 0–14)
WBC # BLD AUTO: 7.7 K/UL (ref 4.3–11.1)

## 2023-01-08 PROCEDURE — 85027 COMPLETE CBC AUTOMATED: CPT

## 2023-01-08 PROCEDURE — 83690 ASSAY OF LIPASE: CPT

## 2023-01-08 PROCEDURE — 83735 ASSAY OF MAGNESIUM: CPT

## 2023-01-08 PROCEDURE — 84484 ASSAY OF TROPONIN QUANT: CPT

## 2023-01-08 PROCEDURE — 80053 COMPREHEN METABOLIC PANEL: CPT

## 2023-01-08 PROCEDURE — 71046 X-RAY EXAM CHEST 2 VIEWS: CPT

## 2023-01-08 PROCEDURE — 85379 FIBRIN DEGRADATION QUANT: CPT

## 2023-01-08 PROCEDURE — 96374 THER/PROPH/DIAG INJ IV PUSH: CPT

## 2023-01-08 PROCEDURE — 96375 TX/PRO/DX INJ NEW DRUG ADDON: CPT

## 2023-01-08 PROCEDURE — 6360000002 HC RX W HCPCS: Performed by: PHYSICIAN ASSISTANT

## 2023-01-08 PROCEDURE — 6370000000 HC RX 637 (ALT 250 FOR IP): Performed by: PHYSICIAN ASSISTANT

## 2023-01-08 PROCEDURE — 93005 ELECTROCARDIOGRAM TRACING: CPT | Performed by: PHYSICIAN ASSISTANT

## 2023-01-08 RX ORDER — ONDANSETRON 2 MG/ML
4 INJECTION INTRAMUSCULAR; INTRAVENOUS
Status: COMPLETED | OUTPATIENT
Start: 2023-01-08 | End: 2023-01-08

## 2023-01-08 RX ORDER — NITROGLYCERIN 0.4 MG/1
0.4 TABLET SUBLINGUAL
Status: COMPLETED | OUTPATIENT
Start: 2023-01-08 | End: 2023-01-08

## 2023-01-08 RX ORDER — KETOROLAC TROMETHAMINE 30 MG/ML
30 INJECTION, SOLUTION INTRAMUSCULAR; INTRAVENOUS ONCE
Status: COMPLETED | OUTPATIENT
Start: 2023-01-08 | End: 2023-01-08

## 2023-01-08 RX ORDER — DEXAMETHASONE SODIUM PHOSPHATE 10 MG/ML
10 INJECTION INTRAMUSCULAR; INTRAVENOUS ONCE
Status: COMPLETED | OUTPATIENT
Start: 2023-01-08 | End: 2023-01-08

## 2023-01-08 RX ADMIN — DEXAMETHASONE SODIUM PHOSPHATE 10 MG: 10 INJECTION INTRAMUSCULAR; INTRAVENOUS at 03:49

## 2023-01-08 RX ADMIN — KETOROLAC TROMETHAMINE 30 MG: 30 INJECTION, SOLUTION INTRAMUSCULAR at 03:49

## 2023-01-08 RX ADMIN — NITROGLYCERIN 0.4 MG: 0.4 TABLET, ORALLY DISINTEGRATING SUBLINGUAL at 02:59

## 2023-01-08 RX ADMIN — ONDANSETRON 4 MG: 2 INJECTION INTRAMUSCULAR; INTRAVENOUS at 03:04

## 2023-01-08 ASSESSMENT — ENCOUNTER SYMPTOMS
SORE THROAT: 0
DIARRHEA: 1
RHINORRHEA: 0
CONSTIPATION: 0
SHORTNESS OF BREATH: 1
NAUSEA: 1
COUGH: 0
ABDOMINAL PAIN: 0
VOMITING: 0

## 2023-01-08 ASSESSMENT — PAIN DESCRIPTION - FREQUENCY: FREQUENCY: CONTINUOUS

## 2023-01-08 ASSESSMENT — PAIN DESCRIPTION - PAIN TYPE: TYPE: ACUTE PAIN

## 2023-01-08 ASSESSMENT — PAIN DESCRIPTION - ORIENTATION: ORIENTATION: LEFT

## 2023-01-08 ASSESSMENT — PAIN SCALES - GENERAL
PAINLEVEL_OUTOF10: 6
PAINLEVEL_OUTOF10: 6
PAINLEVEL_OUTOF10: 4

## 2023-01-08 ASSESSMENT — PAIN DESCRIPTION - LOCATION: LOCATION: CHEST;ARM

## 2023-01-08 ASSESSMENT — PAIN - FUNCTIONAL ASSESSMENT: PAIN_FUNCTIONAL_ASSESSMENT: 0-10

## 2023-01-08 NOTE — DISCHARGE INSTRUCTIONS
Labs look reassuring here today. D-dimer negative. Troponins normal.  EKG looks okay. Chest x-ray clear. I suspect this may be some musculoskeletal chest pain. We have given you some Decadron and Toradol here in the department. Encourage follow-up with your family doctor as needed. Return to the ED as needed for new or worsening symptoms.

## 2023-01-08 NOTE — ED PROVIDER NOTES
Emergency Department Provider Note                   PCP:                On File Not (Inactive)               Age: 27 y.o. Sex: female       ICD-10-CM    1. Atypical chest pain  R07.89           DISPOSITION Decision To Discharge 01/08/2023 03:58:57 AM       Medical Decision Making  Patient is a 49-year-old female who presented to facility with atypical chest pain. Vital signs stable here in the department. Work-up here today grossly unremarkable including negative troponins, negative D-dimer, normal basic labs. EKG unremarkable as well. Patient was given Toradol and Decadron here in the department with some improvement of symptoms. I spent time reassuring the patient of the findings here today. Discussed that although I did not have a perfect explanation for what may have caused her symptoms there appeared to be no acute emergency here today. We did discuss the possibility of some chest wall inflammation which explains why the medications we gave her here today did help her symptoms. We did not encourage close follow-up with her family doctor. We discussed return precautions to the ED which the patient reports understanding. Patient discharged in stable condition. Amount and/or Complexity of Data Reviewed  Labs: ordered. Radiology: ordered. ECG/medicine tests: ordered. Risk  Prescription drug management. Complexity of Problem: 1 stable, acute illness. (3)    I have conducted an independent ordering and review of Labs. I have conducted an independent ordering and review of EKG. I have conducted an independent ordering and review of X-rays. Considerations: Shared decision making was utilized in the care of this patient. Patient was discharged risks and benefits of hospitalization were discussed.          Orders Placed This Encounter   Procedures    XR CHEST (2 VW)    CBC    Comprehensive Metabolic Panel    Troponin    Magnesium    Lipase    D-Dimer, Quantitative    Cardiac Monitor    Pulse Oximetry    EKG 12 Lead    Saline lock IV        Medications   ondansetron (ZOFRAN) injection 4 mg (4 mg IntraVENous Given 1/8/23 0304)   nitroGLYCERIN (NITROSTAT) SL tablet 0.4 mg (0.4 mg SubLINGual Given 1/8/23 0259)   ketorolac (TORADOL) injection 30 mg (30 mg IntraVENous Given 1/8/23 7115)   dexamethasone (DECADRON) injection 10 mg (10 mg IntraVENous Given 1/8/23 4773)       Discharge Medication List as of 1/8/2023  4:30 AM           Sandra Sosa is a 27 y.o. female who presents to the Emergency Department with chief complaint of    Chief Complaint   Patient presents with    Chest Pain      Patient is a 53-year-old female who presents to facility with report of left-sided chest pain that goes to her left arm. It started around 7 PM tonight. She states she feels like the pain is worse when she takes deep breaths. She states she had COVID back in June and states she is kind of had a chronic cough since then that is intermittent and has not changed recently. States she has a little bit nausea but has not thrown up. She states has had diarrhea for the past couple weeks. 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 She is not currently on blood thinners. She is a smoker she reports. She denies any recent falls or traumas to the chest    The history is provided by the patient. Review of Systems   Constitutional:  Negative for chills and fever. HENT:  Negative for congestion, rhinorrhea and sore throat. Respiratory:  Positive for shortness of breath. Negative for cough. Cardiovascular:  Positive for chest pain. Negative for palpitations and leg swelling. Gastrointestinal:  Positive for diarrhea and nausea. Negative for abdominal pain, constipation and vomiting. Genitourinary:  Negative for dysuria, frequency and urgency. Musculoskeletal:  Negative for arthralgias, gait problem, myalgias and neck pain. Skin:  Negative for rash.    Neurological: Negative for dizziness, syncope and headaches. Psychiatric/Behavioral:  Negative for agitation and behavioral problems. All other systems reviewed and are negative.     Past Medical History:   Diagnosis Date    Neurological disorder     Other ill-defined conditions(799.89)     Other ill-defined conditions(799.89)     fibromyalgia    Other ill-defined conditions(799.89)     narcalepsy    PCOS (polycystic ovarian syndrome)         Past Surgical History:   Procedure Laterality Date    CHOLECYSTECTOMY      WISDOM TOOTH EXTRACTION          Family History   Problem Relation Age of Onset    Hypertension Father     Breast Cancer Neg Hx     Ovarian Cancer Neg Hx     Colon Cancer Neg Hx     Diabetes Father         Social History     Socioeconomic History    Marital status: Single   Tobacco Use    Smoking status: Never    Smokeless tobacco: Never   Substance and Sexual Activity    Alcohol use: No    Drug use: No        Allergies: Clindamycin, Azithromycin, Benzalkonium chloride, Doxycycline, Pregabalin, Penicillins, and Sulfa antibiotics    Discharge Medication List as of 1/8/2023  4:30 AM        CONTINUE these medications which have NOT CHANGED    Details   cetirizine (ZYRTEC) 10 MG tablet Take 10 mg by mouth 2 times dailyHistorical Med      EPINEPHrine (EPIPEN) 0.3 MG/0.3ML SOAJ injection Inject 0.3 mg into the muscle once as neededHistorical Med      hydrOXYzine (VISTARIL) 50 MG capsule Take 50 mg by mouthHistorical Med      ibuprofen (ADVIL;MOTRIN) 800 MG tablet Take 800 mg by mouth every 8 hours as neededHistorical Med      Magnesium Oxide 500 MG TABS Take by mouthHistorical Med      OXcarbazepine (TRILEPTAL) 150 MG tablet Take 200 mg by mouthHistorical Med      pseudoephedrine (SUDAFED) 30 MG tablet Take 60 mg by mouth 2 times dailyHistorical Med      SUMAtriptan (IMITREX) 100 MG tablet Take 100 mg by mouth 2 times daily as neededHistorical Med      !! topiramate (TOPAMAX) 100 MG tablet Take 100 mg by mouth 2 times dailyHistorical Med      !! topiramate (TOPAMAX) 200 MG tablet Take 200 mg by mouthHistorical Med      ziprasidone (GEODON) 40 MG capsule Take 40 mg by mouthHistorical Med       !! - Potential duplicate medications found. Please discuss with provider. Vitals signs and nursing note reviewed. Patient Vitals for the past 4 hrs:   Pulse Resp BP SpO2   01/08/23 0400 78 17 119/76 97 %          Physical Exam  Vitals and nursing note reviewed. Constitutional:       General: She is not in acute distress. Appearance: Normal appearance. She is not ill-appearing. HENT:      Head: Normocephalic and atraumatic. Right Ear: External ear normal.      Left Ear: External ear normal.   Eyes:      Extraocular Movements: Extraocular movements intact. Conjunctiva/sclera: Conjunctivae normal.   Cardiovascular:      Rate and Rhythm: Normal rate and regular rhythm. Pulses: Normal pulses. Heart sounds: Normal heart sounds. Pulmonary:      Effort: Pulmonary effort is normal.      Breath sounds: Normal breath sounds. Chest:      Chest wall: Tenderness (left breast/chest wall) present. Abdominal:      General: Abdomen is flat. Bowel sounds are normal. There is no distension. Palpations: Abdomen is soft. Tenderness: There is no abdominal tenderness. There is no guarding or rebound. Musculoskeletal:         General: Normal range of motion. Cervical back: Normal range of motion. Skin:     General: Skin is warm and dry. Capillary Refill: Capillary refill takes less than 2 seconds. Neurological:      General: No focal deficit present. Mental Status: She is alert and oriented to person, place, and time.    Psychiatric:         Mood and Affect: Mood normal.         Behavior: Behavior normal.        Procedures    ED EKG Interpretation  EKG was interpreted in the absence of a cardiologist.    Rate: 87  EKG Interpretation: EKG Interpretation: sinus rhythm  ST Segments: Nonspecific ST segments - NO STEMI    Results for orders placed or performed during the hospital encounter of 01/08/23   XR CHEST (2 VW)    Narrative    Frontal and lateral views of the chest     COMPARISON: July 20, 2022    INDICATION: Chest pain    FINDINGS:     There is no focal pulmonary consolidation. No pleural effusion, pneumothorax or  evidence of pulmonary edema. The cardiomediastinal contour is within normal  limits. The surrounding bones are intact. Impression    Negative chest x-ray. CBC   Result Value Ref Range    WBC 7.7 4.3 - 11.1 K/uL    RBC 4.49 4.05 - 5.2 M/uL    Hemoglobin 13.5 11.7 - 15.4 g/dL    Hematocrit 39.5 35.8 - 46.3 %    MCV 88.0 82 - 102 FL    MCH 30.1 26.1 - 32.9 PG    MCHC 34.2 31.4 - 35.0 g/dL    RDW 12.1 11.9 - 14.6 %    Platelets 519 130 - 516 K/uL    MPV 9.5 9.4 - 12.3 FL    nRBC 0.00 0.0 - 0.2 K/uL   Comprehensive Metabolic Panel   Result Value Ref Range    Sodium 142 133 - 143 mmol/L    Potassium 3.8 3.5 - 5.1 mmol/L    Chloride 111 (H) 101 - 110 mmol/L    CO2 24 21 - 32 mmol/L    Anion Gap 7 2 - 11 mmol/L    Glucose 107 (H) 65 - 100 mg/dL    BUN 14 6 - 23 MG/DL    Creatinine 1.00 0.6 - 1.0 MG/DL    Est, Glom Filt Rate >60 >60 ml/min/1.73m2    Calcium 9.4 8.3 - 10.4 MG/DL    Total Bilirubin 0.2 0.2 - 1.1 MG/DL    ALT 26 12 - 65 U/L    AST 16 15 - 37 U/L    Alk Phosphatase 80 50 - 136 U/L    Total Protein 7.4 6.3 - 8.2 g/dL    Albumin 3.8 3.5 - 5.0 g/dL    Globulin 3.6 2.8 - 4.5 g/dL    Albumin/Globulin Ratio 1.1 0.4 - 1.6     Troponin   Result Value Ref Range    Troponin, High Sensitivity 5.1 0 - 14 pg/mL   Troponin   Result Value Ref Range    Troponin, High Sensitivity 4.7 0 - 14 pg/mL   Magnesium   Result Value Ref Range    Magnesium 2.4 1.8 - 2.4 mg/dL   Lipase   Result Value Ref Range    Lipase 129 73 - 393 U/L   D-Dimer, Quantitative   Result Value Ref Range    D-Dimer, Quant 0.42 <0.56 ug/ml(FEU)        XR CHEST (2 VW)   Final Result      Negative chest x-ray. Voice dictation software was used during the making of this note. This software is not perfect and grammatical and other typographical errors may be present. This note has not been completely proofread for errors.      [employer], [place]  01/08/23 [id number]

## 2023-01-08 NOTE — ED TRIAGE NOTES
Ambulatory with steady gait into triage. Reports left sided chest pain radiating under left arm and down left arm. Onset approx 1920 tonight, sudden onset while sitting down. Increased pain with inspiration. Cough since covid in June with worsening over past week. Associated nausea, denies vomiting. Diarrhea since 12/28. Hx DVT to RUE. Denies blood thinners. Denies BCP, Smoker.